# Patient Record
Sex: FEMALE | URBAN - METROPOLITAN AREA
[De-identification: names, ages, dates, MRNs, and addresses within clinical notes are randomized per-mention and may not be internally consistent; named-entity substitution may affect disease eponyms.]

---

## 2021-03-13 ENCOUNTER — INPATIENT (INPATIENT)
Facility: HOSPITAL | Age: 71
LOS: 5 days | Discharge: ANOTHER HOSPITAL | End: 2021-03-19
Attending: STUDENT IN AN ORGANIZED HEALTH CARE EDUCATION/TRAINING PROGRAM | Admitting: STUDENT IN AN ORGANIZED HEALTH CARE EDUCATION/TRAINING PROGRAM
Payer: MEDICARE

## 2021-03-13 VITALS
SYSTOLIC BLOOD PRESSURE: 137 MMHG | OXYGEN SATURATION: 96 % | RESPIRATION RATE: 16 BRPM | HEIGHT: 63 IN | HEART RATE: 106 BPM | DIASTOLIC BLOOD PRESSURE: 81 MMHG | WEIGHT: 160.06 LBS | TEMPERATURE: 97 F

## 2021-03-13 DIAGNOSIS — Z90.710 ACQUIRED ABSENCE OF BOTH CERVIX AND UTERUS: Chronic | ICD-10-CM

## 2021-03-13 LAB
ALBUMIN SERPL ELPH-MCNC: 4.4 G/DL — SIGNIFICANT CHANGE UP (ref 3.5–5.2)
ALP SERPL-CCNC: 139 U/L — HIGH (ref 30–115)
ALT FLD-CCNC: 12 U/L — SIGNIFICANT CHANGE UP (ref 0–41)
ANION GAP SERPL CALC-SCNC: 13 MMOL/L — SIGNIFICANT CHANGE UP (ref 7–14)
ANION GAP SERPL CALC-SCNC: 16 MMOL/L — HIGH (ref 7–14)
AST SERPL-CCNC: 18 U/L — SIGNIFICANT CHANGE UP (ref 0–41)
BASOPHILS # BLD AUTO: 0.06 K/UL — SIGNIFICANT CHANGE UP (ref 0–0.2)
BASOPHILS NFR BLD AUTO: 0.5 % — SIGNIFICANT CHANGE UP (ref 0–1)
BILIRUB SERPL-MCNC: 0.8 MG/DL — SIGNIFICANT CHANGE UP (ref 0.2–1.2)
BUN SERPL-MCNC: 19 MG/DL — SIGNIFICANT CHANGE UP (ref 10–20)
BUN SERPL-MCNC: 23 MG/DL — HIGH (ref 10–20)
CALCIUM SERPL-MCNC: 10.2 MG/DL — HIGH (ref 8.5–10.1)
CALCIUM SERPL-MCNC: 10.8 MG/DL — HIGH (ref 8.5–10.1)
CHLORIDE SERPL-SCNC: 95 MMOL/L — LOW (ref 98–110)
CHLORIDE SERPL-SCNC: 96 MMOL/L — LOW (ref 98–110)
CO2 SERPL-SCNC: 25 MMOL/L — SIGNIFICANT CHANGE UP (ref 17–32)
CO2 SERPL-SCNC: 25 MMOL/L — SIGNIFICANT CHANGE UP (ref 17–32)
CREAT SERPL-MCNC: 0.6 MG/DL — LOW (ref 0.7–1.5)
CREAT SERPL-MCNC: 0.8 MG/DL — SIGNIFICANT CHANGE UP (ref 0.7–1.5)
EOSINOPHIL # BLD AUTO: 0.19 K/UL — SIGNIFICANT CHANGE UP (ref 0–0.7)
EOSINOPHIL NFR BLD AUTO: 1.7 % — SIGNIFICANT CHANGE UP (ref 0–8)
GLUCOSE SERPL-MCNC: 106 MG/DL — HIGH (ref 70–99)
GLUCOSE SERPL-MCNC: 93 MG/DL — SIGNIFICANT CHANGE UP (ref 70–99)
HCT VFR BLD CALC: 40 % — SIGNIFICANT CHANGE UP (ref 37–47)
HGB BLD-MCNC: 13.2 G/DL — SIGNIFICANT CHANGE UP (ref 12–16)
IMM GRANULOCYTES NFR BLD AUTO: 1.2 % — HIGH (ref 0.1–0.3)
LYMPHOCYTES # BLD AUTO: 1.24 K/UL — SIGNIFICANT CHANGE UP (ref 1.2–3.4)
LYMPHOCYTES # BLD AUTO: 11 % — LOW (ref 20.5–51.1)
MCHC RBC-ENTMCNC: 30.6 PG — SIGNIFICANT CHANGE UP (ref 27–31)
MCHC RBC-ENTMCNC: 33 G/DL — SIGNIFICANT CHANGE UP (ref 32–37)
MCV RBC AUTO: 92.8 FL — SIGNIFICANT CHANGE UP (ref 81–99)
MONOCYTES # BLD AUTO: 0.74 K/UL — HIGH (ref 0.1–0.6)
MONOCYTES NFR BLD AUTO: 6.6 % — SIGNIFICANT CHANGE UP (ref 1.7–9.3)
NEUTROPHILS # BLD AUTO: 8.91 K/UL — HIGH (ref 1.4–6.5)
NEUTROPHILS NFR BLD AUTO: 79 % — HIGH (ref 42.2–75.2)
NRBC # BLD: 0 /100 WBCS — SIGNIFICANT CHANGE UP (ref 0–0)
PLATELET # BLD AUTO: 219 K/UL — SIGNIFICANT CHANGE UP (ref 130–400)
POTASSIUM SERPL-MCNC: 4 MMOL/L — SIGNIFICANT CHANGE UP (ref 3.5–5)
POTASSIUM SERPL-MCNC: 4.1 MMOL/L — SIGNIFICANT CHANGE UP (ref 3.5–5)
POTASSIUM SERPL-SCNC: 4 MMOL/L — SIGNIFICANT CHANGE UP (ref 3.5–5)
POTASSIUM SERPL-SCNC: 4.1 MMOL/L — SIGNIFICANT CHANGE UP (ref 3.5–5)
PROT SERPL-MCNC: 7.9 G/DL — SIGNIFICANT CHANGE UP (ref 6–8)
RBC # BLD: 4.31 M/UL — SIGNIFICANT CHANGE UP (ref 4.2–5.4)
RBC # FLD: 13.5 % — SIGNIFICANT CHANGE UP (ref 11.5–14.5)
SARS-COV-2 RNA SPEC QL NAA+PROBE: SIGNIFICANT CHANGE UP
SODIUM SERPL-SCNC: 134 MMOL/L — LOW (ref 135–146)
SODIUM SERPL-SCNC: 136 MMOL/L — SIGNIFICANT CHANGE UP (ref 135–146)
WBC # BLD: 11.27 K/UL — HIGH (ref 4.8–10.8)
WBC # FLD AUTO: 11.27 K/UL — HIGH (ref 4.8–10.8)

## 2021-03-13 PROCEDURE — 73030 X-RAY EXAM OF SHOULDER: CPT | Mod: 26,50

## 2021-03-13 PROCEDURE — 93010 ELECTROCARDIOGRAM REPORT: CPT

## 2021-03-13 PROCEDURE — 99285 EMERGENCY DEPT VISIT HI MDM: CPT | Mod: CS

## 2021-03-13 PROCEDURE — 71045 X-RAY EXAM CHEST 1 VIEW: CPT | Mod: 26

## 2021-03-13 PROCEDURE — 72100 X-RAY EXAM L-S SPINE 2/3 VWS: CPT | Mod: 26

## 2021-03-13 PROCEDURE — 73521 X-RAY EXAM HIPS BI 2 VIEWS: CPT | Mod: 26

## 2021-03-13 PROCEDURE — 99223 1ST HOSP IP/OBS HIGH 75: CPT

## 2021-03-13 RX ORDER — SODIUM CHLORIDE 9 MG/ML
1000 INJECTION, SOLUTION INTRAVENOUS
Refills: 0 | Status: DISCONTINUED | OUTPATIENT
Start: 2021-03-13 | End: 2021-03-13

## 2021-03-13 RX ORDER — SODIUM CHLORIDE 9 MG/ML
1000 INJECTION, SOLUTION INTRAVENOUS
Refills: 0 | Status: DISCONTINUED | OUTPATIENT
Start: 2021-03-13 | End: 2021-03-15

## 2021-03-13 RX ORDER — ACETAMINOPHEN 500 MG
650 TABLET ORAL EVERY 6 HOURS
Refills: 0 | Status: DISCONTINUED | OUTPATIENT
Start: 2021-03-13 | End: 2021-03-19

## 2021-03-13 RX ORDER — ENOXAPARIN SODIUM 100 MG/ML
40 INJECTION SUBCUTANEOUS DAILY
Refills: 0 | Status: DISCONTINUED | OUTPATIENT
Start: 2021-03-13 | End: 2021-03-19

## 2021-03-13 RX ADMIN — SODIUM CHLORIDE 250 MILLILITER(S): 9 INJECTION, SOLUTION INTRAVENOUS at 12:45

## 2021-03-13 NOTE — ED PROVIDER NOTE - OBJECTIVE STATEMENT
Pt with hx of OA, lives alone and has chronic right knee, hip and shoulder pain that prevents her from properly taking care of herself. Pt states that for 3 months has not been drinking or eating enough because of her inability to take care of her self secondary from arthritic pain. No relief with tylenol. Pt feels generalized weakness and states her urine is dark. Denies abd pain, NVD, fever, chills, dysuria, cough, SOB.

## 2021-03-13 NOTE — ED PROVIDER NOTE - NS ED ROS FT
CONST: No fever, chills or bodyaches  EYES: No pain, redness, drainage or visual changes.  ENT: No ear pain or discharge, nasal discharge or congestion. No sore throat  CARD: No chest pain, palpitations  RESP: No SOB, cough, hemoptysis. No hx of asthma or COPD  GI: No abdominal pain, N/V/D  MS: Chronic joint pain or right shoulder hip and knee, No back pain  pain/injury  SKIN: No rashes  NEURO: No headache, dizziness, paresthesias or LOC  : No dysuria

## 2021-03-13 NOTE — ED ADULT NURSE NOTE - OBJECTIVE STATEMENT
Pt presents to ED c/o generalized weakness, joint pain, loss of appetite, dehydration, all since January. Pt denies PMH/current meds. Pt. lives alone. Denies fever, chills, sob, cp.

## 2021-03-13 NOTE — H&P ADULT - NSHPLABSRESULTS_GEN_ALL_CORE
13.2   11.27 )-----------( 219      ( 13 Mar 2021 12:21 )             40.0     03-13    136  |  95<L>  |  23<H>  ----------------------------<  106<H>  4.0   |  25  |  0.8    Ca    10.8<H>      13 Mar 2021 12:21    TPro  7.9  /  Alb  4.4  /  TBili  0.8  /  DBili  x   /  AST  18  /  ALT  12  /  AlkPhos  139<H>  03-13

## 2021-03-13 NOTE — ED PROVIDER NOTE - CLINICAL SUMMARY MEDICAL DECISION MAKING FREE TEXT BOX
Pt w/ generalized weakness. labs suggest dehydration. After IVF, pt still says she's too weak to stand, and also endorses concern for falling bc so weak. Will admit to medicine for failure to ambulate.

## 2021-03-13 NOTE — H&P ADULT - NSHPPHYSICALEXAM_GEN_ALL_CORE
GENERAL: Obese, NAD, lying in bed comfortably  HEAD: Atraumatic, Normocephalic  EYES: EOMI, PERRLA, conjunctiva pink and cornea white  ENT: Normal external ears and nose, no discharges; moist mucous membranes  NECK: Supple, nontender to palpation; no JVD  CHEST/LUNG: Clear to auscultation bilaterally; No rales, rhonchi, wheezing, or rubs. Unlabored respirations  HEART: Regular rate and rhythm; No murmurs, rubs, or gallops  ABDOMEN: Soft, nontender, nondistended; no rebound tenderness, no guarding; + bowel sounds  EXTREMITIES:  2+ Peripheral Pulses, brisk capillary refill. No clubbing, cyanosis, or pitting petal edema  NERVOUS SYSTEM: Alert and oriented to person, time, place and situation, speech clear. No focal deficits   MSK: Decreased ROM on bilateral shoulder, worse on right, with point tenderness at the above the scapula. Pain on palpation of right hip; patient refused to move her hip; No tenderness on knees, ankles, wrists or elbow  SKIN: No rashes or lesions

## 2021-03-13 NOTE — H&P ADULT - HISTORY OF PRESENT ILLNESS
70 years old female from home with PMHx of OA presented for difficulty ambulation 70 years old female from home with PMHx of OA of bilateral knees, right rotator ruff tear (?), gout, diverticulosis, presented with difficulty ambulation. Patient started to have worsening bilateral hip pain, and right shoulder pain, severely hindering her daily activities. She uses a walker to ambulate around her house but it has been getting more and more difficult complete her daily routine due to worsening pain. She also feels short of breath after exertion.     Patient also has been having poor appetite and nausea. She did not weight herself regularly but her sister noticed her clothes are getting looser. She did not have EGD in the past but her last colonoscopy in Feb 2020 was unrevealing, only some small polyps (GI Dr. Trammell 70 years old female from home with PMHx of OA of bilateral knees, right rotator ruff tear (?), gout, diverticulosis, presented with difficulty ambulation. Patient started to have worsening bilateral hip pain, and right shoulder pain, severely hindering her daily activities. She uses a walker to ambulate around her house but it has been getting more and more difficult complete her daily routine due to worsening pain. She also feels short of breath after exertion.     Patient also has been having poor appetite and nausea. She did not weight herself regularly but her sister noticed her clothes are getting looser. She did not have EGD in the past but her last colonoscopy in Feb 2020 was unrevealing, only some small polyps.  70 years old female from home and lives alone with PMHx of OA of bilateral knees, right rotator ruff tear (?), gout, diverticulosis, presented with difficulty ambulation. Patient started to have worsening bilateral hip pain, and right shoulder pain, severely hindering her daily activities. She uses a walker to ambulate around her house but it has been getting more and more difficult complete her daily routine due to worsening pain. She also feels short of breath after exertion.     Patient also has been having poor appetite, nausea, and constipation. She did not weight herself regularly but her sister noticed her clothes are getting looser. She did not have EGD in the past but her last colonoscopy in Feb 2020 was unrevealing, only some small polyps.    She denies fever/chills, cough, chest pain, abdominal pain, vomiting, diarrhea, or urinary symptoms.    Called PCP Dr. Urbina, he confirmed patient's history but he is worried that she might need psychiatric evaluation due to recent stress (her pet passed away and she locked himself in her house for awhile).

## 2021-03-13 NOTE — ED PROVIDER NOTE - ATTENDING CONTRIBUTION TO CARE
71 y/o F pmh OA, w/ chronic R knee and shoulder pain, worsening p/w generallized weakness, poor PO, difficulty ambulating, progressively getting worse x 3mo, worse past 1wk. Pt seen by sister today, who noted pt was so week could not ambulate to in house (usu uses walker). pt denies focal weakness, falls, v/d, fever, cough.    On exam pt is tired, Dry MMM; breathing unlabored, abd s/nt; pt is moving all extrem, good mm tone but feels too weak to rise, and cannot stand bc of weakness.    IMP: dehydration, generalized weakness, decreased ambulation.  P: labs, ivf, reassess.

## 2021-03-13 NOTE — H&P ADULT - ASSESSMENT
70 years old female from home and lives alone with PMHx of OA of bilateral knees, right rotator ruff tear (?), gout, diverticulosis, presented with difficulty ambulation due to worsening pain on hips and shoulders.    # Probable worsening osteoarthritis of hips and shoulders  - Patient is current comfortable and pain free but unable to take care of herself at home  - Will get xray of bilateral shoulders and hips, and CXR  - Pain control with Tylenol as needed  - PT/OT, activity increased as tolerated  - Consider consulting Ortho if there are concering xray findings    # Mild hypercalcemia  - Ca 10.4, albumin 4.4  - Likely secondary to dehydration (patient reports not eating or drinking much because it was difficult for her to get around)  - Continue gentle IVF for now  - Repeat BMP at 4pm  - If it is still elevated, please get vitamin D 1,25 and 25; PTH, urine spot calcium, phos    # Gout  - Not on any medication  - Will avoid red meat diet while inpatinet      DVT ppx: Lovenox 40mg qdaily  GI ppx: Protonix  Diet: Regular  Activity: Increase as tolerated   Ambulatory status: Ambulate with walker  Lines: Peripheral IVs  Code status: FULL CODE  Dispo: acute, will need proper placement

## 2021-03-13 NOTE — ED PROVIDER NOTE - CARE PLAN
Principal Discharge DX:	Generalized weakness  Secondary Diagnosis:	Inability to ambulate due to multiple joints  Secondary Diagnosis:	Dehydration

## 2021-03-13 NOTE — H&P ADULT - ATTENDING COMMENTS
HPI:  70 years old woman from home and lives alone with PMHx of OA of bilateral knees, right rotator ruff tear (?), gout, diverticulosis, presented with difficulty ambulation. She started to have worsening bilateral hip pain, and right shoulder pain, over the last couple months. She reports that her out patient doctors suggested that she have her right hip replaced. Over the last several weeks the pain has increased severely hindering her daily activities. She uses a walker to ambulate around her house and feels short of breath after exertion after minimal exertion. No LE edema, palpitations, chest pain, fever, chills, cough, abdo pain, v/d. She does report constipation and mild nausea.    She has not been eating or drinking well because it is too difficult to move around her house. She does have people to check on her but doesn't want to burden them with her problems.  Her sister reported that Catie's clothes have been looser lately. She did not have EGD in the past but her last colonoscopy in Feb 2020 was unrevealing, only some small polyps.    She denied any low mood or depression. She stays actively engaged and communicates frequently with friends and family. Close with her sister in [place], friends stop by to check on her.       REVIEW OF SYSTEMS:  CONSTITUTIONAL:  No weakness, fevers, chills, night sweats, weight loss  EYES/ENT: No visual changes. No vertigo or dysphagia  NECK: No neck pain or stiffness  RESPIRATORY: No cough, wheezing, hemoptysis. No shortness of breath  CARDIOVASCULAR: No chest pain or palpitations. No lower extremity edema  GASTROINTESTINAL: No abdominal pain. + constipation, nausea, no vomiting, diarrhea, or hematemesis  GENITOURINARY: No dysuria or hematuria   NEUROLOGICAL: No focal numbness or weakness  MSK: +right hip pain, difficulty ambulating   SKIN: No rashes or itching  HEMATOLOGIC: No easy bruising or prolonged bleeding.      PHYSICAL EXAM:  GENERAL: mild distress, obese, Non-toxic, stated age   HEAD:  Atraumatic, Normocephalic, dry mucus membranes   EYES: EOMI, Sclera White   NECK: Supple, No JVD  CHEST/LUNG: Clear to auscultation bilaterally; No wheezing, rhonchi, or crackles  HEART: Regular rate and rhythm; s1, s2, No murmurs, rubs, or gallops  ABDOMEN: Soft, Nontender, Nondistended; Bowel sounds present, No rebound or guarding noted   EXTREMITIES:  No lower extremity edema or calf tenderness to palpation.  No clubbing or cyanosis  MSK: Right hip pain to palpation/pressure. Right hip active flexion causes tenderness but passive movement does not.   PSYCH: AAOx3, pleasant, cooperative, not anxious  NEUROLOGY: non-focal, 4/5 RLE hip flexion secondary to pain.   SKIN: No rashes or lesions      ASSESSMENT AND PLAN:  Difficulty ambulating and inability to care for oneself: Secondary to severe OA of Right. Follow up Xrays of right hip and shoulder. May require ortho evaluation. PT eval. Pain control with tylenol, Lidoderm patch, and toradol     Dyspnea on exertion: May be secondary to deconditioning but check 2D Echo.    Dehydration with mild contraction: continue with IV fluids for 1 more liter. She is actively thirsty and hungry.     Hypercalcemia: may be secondary to dehydration. Improved with IVF, check PTH, Vitamin D.    Gout: managed with diet.     Obesity: Patient should follow with an out patient nutritionist/dietician     DVT ppx: Lovenox/Heparin  GI ppx: While on toradol.   GOC: Full code.  My note supersedes the residents in the event of a discrepancy.

## 2021-03-13 NOTE — ED ADULT TRIAGE NOTE - CHIEF COMPLAINT QUOTE
Pt presents to ED c/o generalized weakness, joint pain, loss of appetite, dehydration, all since January. Pt denies PMH/current meds.

## 2021-03-14 ENCOUNTER — TRANSCRIPTION ENCOUNTER (OUTPATIENT)
Age: 71
End: 2021-03-14

## 2021-03-14 LAB
ALBUMIN SERPL ELPH-MCNC: 3.8 G/DL — SIGNIFICANT CHANGE UP (ref 3.5–5.2)
ALP SERPL-CCNC: 108 U/L — SIGNIFICANT CHANGE UP (ref 30–115)
ALT FLD-CCNC: 9 U/L — SIGNIFICANT CHANGE UP (ref 0–41)
ANION GAP SERPL CALC-SCNC: 15 MMOL/L — HIGH (ref 7–14)
APPEARANCE UR: ABNORMAL
AST SERPL-CCNC: 17 U/L — SIGNIFICANT CHANGE UP (ref 0–41)
BACTERIA # UR AUTO: ABNORMAL
BASOPHILS # BLD AUTO: 0.04 K/UL — SIGNIFICANT CHANGE UP (ref 0–0.2)
BASOPHILS NFR BLD AUTO: 0.4 % — SIGNIFICANT CHANGE UP (ref 0–1)
BILIRUB SERPL-MCNC: 0.6 MG/DL — SIGNIFICANT CHANGE UP (ref 0.2–1.2)
BILIRUB UR-MCNC: NEGATIVE — SIGNIFICANT CHANGE UP
BUN SERPL-MCNC: 18 MG/DL — SIGNIFICANT CHANGE UP (ref 10–20)
CALCIUM SERPL-MCNC: 9.9 MG/DL — SIGNIFICANT CHANGE UP (ref 8.5–10.1)
CHLORIDE SERPL-SCNC: 98 MMOL/L — SIGNIFICANT CHANGE UP (ref 98–110)
CO2 SERPL-SCNC: 23 MMOL/L — SIGNIFICANT CHANGE UP (ref 17–32)
COLOR SPEC: YELLOW — SIGNIFICANT CHANGE UP
CREAT SERPL-MCNC: 0.6 MG/DL — LOW (ref 0.7–1.5)
DIFF PNL FLD: NEGATIVE — SIGNIFICANT CHANGE UP
EOSINOPHIL # BLD AUTO: 0.21 K/UL — SIGNIFICANT CHANGE UP (ref 0–0.7)
EOSINOPHIL NFR BLD AUTO: 1.9 % — SIGNIFICANT CHANGE UP (ref 0–8)
EPI CELLS # UR: 4 /HPF — SIGNIFICANT CHANGE UP (ref 0–5)
GLUCOSE SERPL-MCNC: 105 MG/DL — HIGH (ref 70–99)
GLUCOSE UR QL: NEGATIVE — SIGNIFICANT CHANGE UP
HCT VFR BLD CALC: 35 % — LOW (ref 37–47)
HGB BLD-MCNC: 11.9 G/DL — LOW (ref 12–16)
HYALINE CASTS # UR AUTO: 3 /LPF — SIGNIFICANT CHANGE UP (ref 0–7)
IMM GRANULOCYTES NFR BLD AUTO: 1.4 % — HIGH (ref 0.1–0.3)
KETONES UR-MCNC: SIGNIFICANT CHANGE UP
LEUKOCYTE ESTERASE UR-ACNC: NEGATIVE — SIGNIFICANT CHANGE UP
LYMPHOCYTES # BLD AUTO: 1.55 K/UL — SIGNIFICANT CHANGE UP (ref 1.2–3.4)
LYMPHOCYTES # BLD AUTO: 13.8 % — LOW (ref 20.5–51.1)
MAGNESIUM SERPL-MCNC: 1.8 MG/DL — SIGNIFICANT CHANGE UP (ref 1.8–2.4)
MCHC RBC-ENTMCNC: 31.6 PG — HIGH (ref 27–31)
MCHC RBC-ENTMCNC: 34 G/DL — SIGNIFICANT CHANGE UP (ref 32–37)
MCV RBC AUTO: 92.8 FL — SIGNIFICANT CHANGE UP (ref 81–99)
MONOCYTES # BLD AUTO: 0.95 K/UL — HIGH (ref 0.1–0.6)
MONOCYTES NFR BLD AUTO: 8.4 % — SIGNIFICANT CHANGE UP (ref 1.7–9.3)
NEUTROPHILS # BLD AUTO: 8.34 K/UL — HIGH (ref 1.4–6.5)
NEUTROPHILS NFR BLD AUTO: 74.1 % — SIGNIFICANT CHANGE UP (ref 42.2–75.2)
NITRITE UR-MCNC: POSITIVE
NRBC # BLD: 0 /100 WBCS — SIGNIFICANT CHANGE UP (ref 0–0)
PH UR: 5.5 — SIGNIFICANT CHANGE UP (ref 5–8)
PHOSPHATE SERPL-MCNC: 4.1 MG/DL — SIGNIFICANT CHANGE UP (ref 2.1–4.9)
PLATELET # BLD AUTO: 195 K/UL — SIGNIFICANT CHANGE UP (ref 130–400)
POTASSIUM SERPL-MCNC: 4.4 MMOL/L — SIGNIFICANT CHANGE UP (ref 3.5–5)
POTASSIUM SERPL-SCNC: 4.4 MMOL/L — SIGNIFICANT CHANGE UP (ref 3.5–5)
PROT SERPL-MCNC: 6.7 G/DL — SIGNIFICANT CHANGE UP (ref 6–8)
PROT UR-MCNC: SIGNIFICANT CHANGE UP
RBC # BLD: 3.77 M/UL — LOW (ref 4.2–5.4)
RBC # FLD: 13.9 % — SIGNIFICANT CHANGE UP (ref 11.5–14.5)
RBC CASTS # UR COMP ASSIST: 1 /HPF — SIGNIFICANT CHANGE UP (ref 0–4)
SODIUM SERPL-SCNC: 136 MMOL/L — SIGNIFICANT CHANGE UP (ref 135–146)
SP GR SPEC: 1.03 — SIGNIFICANT CHANGE UP (ref 1.01–1.03)
UROBILINOGEN FLD QL: SIGNIFICANT CHANGE UP
WBC # BLD: 11.25 K/UL — HIGH (ref 4.8–10.8)
WBC # FLD AUTO: 11.25 K/UL — HIGH (ref 4.8–10.8)
WBC UR QL: 2 /HPF — SIGNIFICANT CHANGE UP (ref 0–5)

## 2021-03-14 PROCEDURE — 73562 X-RAY EXAM OF KNEE 3: CPT | Mod: 26,RT

## 2021-03-14 PROCEDURE — 73552 X-RAY EXAM OF FEMUR 2/>: CPT | Mod: 26,RT

## 2021-03-14 PROCEDURE — 71250 CT THORAX DX C-: CPT | Mod: 26

## 2021-03-14 PROCEDURE — 72192 CT PELVIS W/O DYE: CPT | Mod: 26

## 2021-03-14 PROCEDURE — 71045 X-RAY EXAM CHEST 1 VIEW: CPT | Mod: 26

## 2021-03-14 PROCEDURE — 73502 X-RAY EXAM HIP UNI 2-3 VIEWS: CPT | Mod: 26,RT

## 2021-03-14 PROCEDURE — 99233 SBSQ HOSP IP/OBS HIGH 50: CPT

## 2021-03-14 RX ADMIN — Medication 650 MILLIGRAM(S): at 22:34

## 2021-03-14 RX ADMIN — ENOXAPARIN SODIUM 40 MILLIGRAM(S): 100 INJECTION SUBCUTANEOUS at 12:17

## 2021-03-14 RX ADMIN — SODIUM CHLORIDE 100 MILLILITER(S): 9 INJECTION, SOLUTION INTRAVENOUS at 05:35

## 2021-03-14 RX ADMIN — Medication 650 MILLIGRAM(S): at 23:00

## 2021-03-14 NOTE — PROGRESS NOTE ADULT - SUBJECTIVE AND OBJECTIVE BOX
Autumn Allan MD  Hospitalist   Spectra: [phone]    Patient is a 70y old  Female who presents with a chief complaint of difficulty ambulation (13 Mar 2021 15:17)      INTERVAL HPI/OVERNIGHT EVENTS: no acute overnight events noted   reports chronic pain over shoulder, back and right hip     REVIEW OF SYSTEMS: negative  Vital Signs Last 24 Hrs  T(C): 35.9 (14 Mar 2021 08:51), Max: 36.6 (14 Mar 2021 00:31)  T(F): 96.7 (14 Mar 2021 08:51), Max: 97.8 (14 Mar 2021 00:31)  HR: 107 (14 Mar 2021 08:51) (84 - 107)  BP: 142/86 (14 Mar 2021 08:51) (124/74 - 152/74)  BP(mean): --  RR: 18 (14 Mar 2021 08:51) (18 - 18)  SpO2: 93% (14 Mar 2021 00:31) (93% - 96%)    PHYSICAL EXAM:   NAD; Normocephalic;   LUNGS - no wheezing  HEART: S1 S2+   ABDOMEN: Soft, Nontender, non distended  EXTREMITIES: no cyanosis; no edema  NERVOUS SYSTEM:  Awake and alert; no focal neuro deficits appreciated    LABS:                        11.9   11.25 )-----------( 195      ( 14 Mar 2021 06:03 )             35.0     03-14    136  |  98  |  18  ----------------------------<  105<H>  4.4   |  23  |  0.6<L>    Ca    9.9      14 Mar 2021 06:03  Phos  4.1     03-14  Mg     1.8     03-14    TPro  6.7  /  Alb  3.8  /  TBili  0.6  /  DBili  x   /  AST  17  /  ALT  9   /  AlkPhos  108  03-14      Urinalysis Basic - ( 14 Mar 2021 08:00 )    Color: Yellow / Appearance: Slightly Turbid / S.027 / pH: x  Gluc: x / Ketone: Trace  / Bili: Negative / Urobili: <2 mg/dL   Blood: x / Protein: Trace / Nitrite: Positive   Leuk Esterase: Negative / RBC: 1 /HPF / WBC 2 /HPF   Sq Epi: x / Non Sq Epi: 4 /HPF / Bacteria: Many      CAPILLARY BLOOD GLUCOSE

## 2021-03-14 NOTE — PHYSICAL THERAPY INITIAL EVALUATION ADULT - LEVEL OF INDEPENDENCE, REHAB EVAL
Patient encountered sitting in chair. Reports being assisted by staff to get in chair./unable to perform

## 2021-03-14 NOTE — CONSULT NOTE ADULT - SUBJECTIVE AND OBJECTIVE BOX
Orthopaedics Consult Note    DOV HENLEY  [phone]    Time consult called: 7:55PM  Time patient seen: 8:00PM    Patient is a 70y year old Female admitted for generalized weakness, weight loss, dyspnea, dehydration and increasing ambulatory dysfunction related to shoulder, hip, knee, and back pains  XR obtained yesterday evening revealed right femoral head sclerotic areas suggesting AVN and abnormal angulation of the femoral neck possibly representing non-displaced fracture  Ortho is consulted this evening to evaluate these findings  Patient complains of pain in the shoulder, back, and right hip  She walks with a walker at home  No fall or trauma    PMH/PSH  GNERALIZED WEAKNESS,INABILITY TO AMBULATE ,DEHTDRATION    ^WEAKNESS    Handoff    MEWS Score    Diverticulosis    Gout    OA (osteoarthritis)    Generalized weakness    H/O total hysterectomy    WEAKNESS    Dehydration    Inability to ambulate due to multiple joints    Medications  acetaminophen   Tablet .. 650 milliGRAM(s) Oral every 6 hours PRN  enoxaparin Injectable 40 milliGRAM(s) SubCutaneous daily  lactated ringers. 1000 milliLiter(s) IV Continuous <Continuous>      Allergies  moxifloxacin (Anaphylaxis (Moderate))        T(C): 36 (03-14-21 @ 16:51), Max: 36.6 (03-14-21 @ 00:31)  HR: 107 (03-14-21 @ 16:51) (84 - 107)  BP: 149/76 (03-14-21 @ 16:51) (124/74 - 152/74)  RR: 20 (03-14-21 @ 18:56) (18 - 22)  SpO2: 94% (03-14-21 @ 18:56) (84% - 96%)    Physical Exam  NAD  Dyspneic on nasal cannula  LUE  NTTP L shoulder  No deformity/laceration/abrasion noted  No swelling/erythema/ecchymosis noted  Motor: AIN/PIN/Ulnar intact  Sensory: Ax/M/R/U intact  Vasc: hand WWP, 2+ radial pulse  RLE  No pain with log roll, axial load, or passive ROM  Able to weakly maintain SLR  No deformity/laceration/abrasion noted  No swelling/erythema/ecchymosis noted  Motor: TA/EHL/Gastroc intact  Sensory: SP/DP/Hernandez/Sa intact  Vasc: foot WWP, 2+ DP pulse    Labs                        11.9   11.25 )-----------( 195      ( 14 Mar 2021 06:03 )             35.0     03-14    136  |  98  |  18  ----------------------------<  105<H>  4.4   |  23  |  0.6<L>    Ca    9.9      14 Mar 2021 06:03  Phos  4.1     03-14  Mg     1.8     03-14    TPro  6.7  /  Alb  3.8  /  TBili  0.6  /  DBili  x   /  AST  17  /  ALT  9   /  AlkPhos  108  03-14    LIVER FUNCTIONS - ( 14 Mar 2021 06:03 )  Alb: 3.8 g/dL / Pro: 6.7 g/dL / ALK PHOS: 108 U/L / ALT: 9 U/L / AST: 17 U/L / GGT: x               Img  XR BILATERAL SHOULDER, PELVIS, AND HIPS  Mild superior subluxation of the left humeral head  Calcification near the GT of the right humeral head possibly representing calcific tendinitis  Mild right femoral head sclerosis  Right hip joint space narrowing and osteophytes indicating moderate right hip OA  No obvious displaced femur fracture, no pelvis fracture, no dislocation    XR lumbar spine  Significant facet arthropathy  L4 on 5 spondylolisthesis  Multilevel degenerative disc disease    Chest CT  Bilateral glenohumeral joints are reduced    A/P: Patient is a 70y year old Female with right hip pain, low back pain, and shoulder pain    Severe low back pain and weakness could have spinal etiology given severe degenerative changes in this area  Suggest neurosurgical evaluation     Regarding the right hip  No obvious displaced fracture on limited XR available  Further, physical examination does not indicate hip fracture  However, given abnormal findings on pelvis XR we recommend XR R hip, femur, and knee as well as CT Pelvis noncontrast with fine bony cuts  NWB until CT is completed  Patient provisionally NPO after MN tonight  If CT shows femoral neck fracture then surgery would be recommended, to be completed in an expeditious fashion    Regarding the shoulders  No dislocation or fracture on XR and CT  Some radiographic and physical examination evidence of rotator cuff pathology  Recommend PT / Rehab evaluation and medical management at this time    Orthopaedics to follow

## 2021-03-14 NOTE — PROGRESS NOTE ADULT - ASSESSMENT
Plan:     1. Nondisplaced femoral neck subcapital impaction fracture  2. FRYE- right hillar mass   3. ambulatory dysfunction   4. gout   5. obestiy     - patient presented with ambulatory dysfunction 2/2 chronic pain and dyspnea with minimal exertion   - Xray Hip: There is patchy sclerosis at the right femoral head consistent with secondary AVN. Band of sclerosis with mild angulation of the femoral head neck junction suggests nondisplaced femoral neck subcapital impaction fracture.   - ortho consulted as pain is co-relating with fracture site   - chest xray - right hilarl mass   - no previous chest imaging available to compare   - will obtain CT chest as mass likely cause  - follow echo   - calcium level improved with hydration   - lovenox     Dispo: acute at this time, ortho and hilar mass work up in progress

## 2021-03-14 NOTE — PHYSICAL THERAPY INITIAL EVALUATION ADULT - TRANSFER SAFETY CONCERNS NOTED: SIT/STAND, REHAB EVAL
decreased safety awareness/stepping too close to front of assistive device/decreased weight-shifting ability

## 2021-03-14 NOTE — OCCUPATIONAL THERAPY INITIAL EVALUATION ADULT - PERTINENT HX OF CURRENT PROBLEM, REHAB EVAL
70 years old female from home and lives alone with PMHx of OA of bilateral knees, right rotator ruff tear (?), gout, diverticulosis, presented with difficulty ambulation. Patient started to have worsening bilateral hip pain, and right shoulder pain, severely hindering her daily activities. She uses a walker to ambulate around her house but it has been getting more and more difficult complete her daily routine due to worsening pain. She also feels short of breath after exertion.

## 2021-03-14 NOTE — PHYSICAL THERAPY INITIAL EVALUATION ADULT - GAIT DEVIATIONS NOTED, PT EVAL
increased time in double stance/decreased step length/decreased stride length/increased stride width

## 2021-03-14 NOTE — DISCHARGE NOTE NURSING/CASE MANAGEMENT/SOCIAL WORK - PATIENT PORTAL LINK FT
You can access the FollowMyHealth Patient Portal offered by [facility] by registering at the following website: [email or web address]. By joining [facility]’s FollowMyHealth portal, you will also be able to view your health information using other applications (apps) compatible with our system.

## 2021-03-14 NOTE — OCCUPATIONAL THERAPY INITIAL EVALUATION ADULT - ADDITIONAL COMMENTS
Pt lives alone in  but reports great difficulty with all ADL's. Pt reports it taking three hours to walk from the couch to the kitchen or to sponge bath in bathroom. Pt's friend shops for her but Pt. reports not eating/drinking very much due to difficulty of moving around. R shoulder/hip has gotten significantly worse in the past 3 months. Pt uses RW for all household mobility. Pt dons socks on toilet.

## 2021-03-14 NOTE — OCCUPATIONAL THERAPY INITIAL EVALUATION ADULT - RANGE OF MOTION EXAMINATION
R Shoulder ~ 20* flexion, Distal grossly 3/5, Pain with PROM over 100* ;  L UE grossly 3/5./deficits as listed below

## 2021-03-15 ENCOUNTER — RESULT REVIEW (OUTPATIENT)
Age: 71
End: 2021-03-15

## 2021-03-15 LAB
ALBUMIN SERPL ELPH-MCNC: 3.3 G/DL — LOW (ref 3.5–5.2)
ALP SERPL-CCNC: 108 U/L — SIGNIFICANT CHANGE UP (ref 30–115)
ALT FLD-CCNC: 9 U/L — SIGNIFICANT CHANGE UP (ref 0–41)
ANION GAP SERPL CALC-SCNC: 12 MMOL/L — SIGNIFICANT CHANGE UP (ref 7–14)
AST SERPL-CCNC: 17 U/L — SIGNIFICANT CHANGE UP (ref 0–41)
B PERT IGG+IGM PNL SER: ABNORMAL
BASOPHILS # BLD AUTO: 0.05 K/UL — SIGNIFICANT CHANGE UP (ref 0–0.2)
BASOPHILS NFR BLD AUTO: 0.5 % — SIGNIFICANT CHANGE UP (ref 0–1)
BILIRUB SERPL-MCNC: 0.5 MG/DL — SIGNIFICANT CHANGE UP (ref 0.2–1.2)
BUN SERPL-MCNC: 16 MG/DL — SIGNIFICANT CHANGE UP (ref 10–20)
CALCIUM SERPL-MCNC: 9.7 MG/DL — SIGNIFICANT CHANGE UP (ref 8.5–10.1)
CHLORIDE SERPL-SCNC: 98 MMOL/L — SIGNIFICANT CHANGE UP (ref 98–110)
CO2 SERPL-SCNC: 24 MMOL/L — SIGNIFICANT CHANGE UP (ref 17–32)
COLOR FLD: SIGNIFICANT CHANGE UP
CREAT SERPL-MCNC: 0.6 MG/DL — LOW (ref 0.7–1.5)
EOSINOPHIL # BLD AUTO: 0.29 K/UL — SIGNIFICANT CHANGE UP (ref 0–0.7)
EOSINOPHIL NFR BLD AUTO: 3 % — SIGNIFICANT CHANGE UP (ref 0–8)
FLUID INTAKE SUBSTANCE CLASS: SIGNIFICANT CHANGE UP
FLUID SEGMENTED GRANULOCYTES: 0 % — SIGNIFICANT CHANGE UP
GLUCOSE SERPL-MCNC: 93 MG/DL — SIGNIFICANT CHANGE UP (ref 70–99)
HCT VFR BLD CALC: 30.8 % — LOW (ref 37–47)
HCV AB S/CO SERPL IA: 0.03 COI — SIGNIFICANT CHANGE UP
HCV AB SERPL-IMP: SIGNIFICANT CHANGE UP
HGB BLD-MCNC: 10.1 G/DL — LOW (ref 12–16)
IMM GRANULOCYTES NFR BLD AUTO: 1.4 % — HIGH (ref 0.1–0.3)
LYMPHOCYTES # BLD AUTO: 1.61 K/UL — SIGNIFICANT CHANGE UP (ref 1.2–3.4)
LYMPHOCYTES # BLD AUTO: 16.9 % — LOW (ref 20.5–51.1)
LYMPHOCYTES # FLD: SIGNIFICANT CHANGE UP
MAGNESIUM SERPL-MCNC: 1.8 MG/DL — SIGNIFICANT CHANGE UP (ref 1.8–2.4)
MCHC RBC-ENTMCNC: 30.8 PG — SIGNIFICANT CHANGE UP (ref 27–31)
MCHC RBC-ENTMCNC: 32.8 G/DL — SIGNIFICANT CHANGE UP (ref 32–37)
MCV RBC AUTO: 93.9 FL — SIGNIFICANT CHANGE UP (ref 81–99)
MESOTHL CELL # FLD: SIGNIFICANT CHANGE UP %
MONOCYTES # BLD AUTO: 0.77 K/UL — HIGH (ref 0.1–0.6)
MONOCYTES NFR BLD AUTO: 8.1 % — SIGNIFICANT CHANGE UP (ref 1.7–9.3)
MONOS+MACROS # FLD: SIGNIFICANT CHANGE UP %
NEUTROPHILS # BLD AUTO: 6.67 K/UL — HIGH (ref 1.4–6.5)
NEUTROPHILS NFR BLD AUTO: 70.1 % — SIGNIFICANT CHANGE UP (ref 42.2–75.2)
NRBC # BLD: 0 /100 WBCS — SIGNIFICANT CHANGE UP (ref 0–0)
PLATELET # BLD AUTO: 172 K/UL — SIGNIFICANT CHANGE UP (ref 130–400)
POTASSIUM SERPL-MCNC: 4.4 MMOL/L — SIGNIFICANT CHANGE UP (ref 3.5–5)
POTASSIUM SERPL-SCNC: 4.4 MMOL/L — SIGNIFICANT CHANGE UP (ref 3.5–5)
PROT SERPL-MCNC: 6.2 G/DL — SIGNIFICANT CHANGE UP (ref 6–8)
RBC # BLD: 3.28 M/UL — LOW (ref 4.2–5.4)
RBC # FLD: 13.8 % — SIGNIFICANT CHANGE UP (ref 11.5–14.5)
RCV VOL RI: HIGH /UL (ref 0–0)
SODIUM SERPL-SCNC: 134 MMOL/L — LOW (ref 135–146)
TOTAL NUCLEATED CELL COUNT, BODY FLUID: 955 /UL — SIGNIFICANT CHANGE UP
TUBE TYPE: SIGNIFICANT CHANGE UP
WBC # BLD: 9.52 K/UL — SIGNIFICANT CHANGE UP (ref 4.8–10.8)
WBC # FLD AUTO: 9.52 K/UL — SIGNIFICANT CHANGE UP (ref 4.8–10.8)

## 2021-03-15 PROCEDURE — 88342 IMHCHEM/IMCYTCHM 1ST ANTB: CPT | Mod: 26

## 2021-03-15 PROCEDURE — 78306 BONE IMAGING WHOLE BODY: CPT | Mod: 26

## 2021-03-15 PROCEDURE — 99233 SBSQ HOSP IP/OBS HIGH 50: CPT

## 2021-03-15 PROCEDURE — 88341 IMHCHEM/IMCYTCHM EA ADD ANTB: CPT | Mod: 26

## 2021-03-15 PROCEDURE — 71045 X-RAY EXAM CHEST 1 VIEW: CPT | Mod: 26

## 2021-03-15 PROCEDURE — 88112 CYTOPATH CELL ENHANCE TECH: CPT | Mod: 26

## 2021-03-15 PROCEDURE — 88305 TISSUE EXAM BY PATHOLOGIST: CPT | Mod: 26

## 2021-03-15 PROCEDURE — 78803 RP LOCLZJ TUM SPECT 1 AREA: CPT | Mod: 26

## 2021-03-15 PROCEDURE — 99222 1ST HOSP IP/OBS MODERATE 55: CPT

## 2021-03-15 RX ADMIN — SODIUM CHLORIDE 100 MILLILITER(S): 9 INJECTION, SOLUTION INTRAVENOUS at 03:46

## 2021-03-15 RX ADMIN — Medication 30 MILLILITER(S): at 02:51

## 2021-03-15 RX ADMIN — ENOXAPARIN SODIUM 40 MILLIGRAM(S): 100 INJECTION SUBCUTANEOUS at 11:31

## 2021-03-15 NOTE — PROGRESS NOTE ADULT - SUBJECTIVE AND OBJECTIVE BOX
DOV HENLEY 70y Female  MRN#: [id number]   Hospital Day: 2d    SUBJECTIVE  Patient is a 70y old Female who presents with a chief complaint of difficulty ambulation (15 Mar 2021 11:34)  Currently admitted to medicine with the primary diagnosis of Generalized weakness      INTERVAL HPI AND OVERNIGHT EVENTS:  Patient was examined and seen at bedside. This morning she is resting comfortably in bed and reports no issues or overnight events.    REVIEW OF SYMPTOMS:  CONSTITUTIONAL: No weakness, fevers or chills; No headaches  EYES: No visual changes, eye pain, or discharge  ENT: No vertigo; No ear pain or change in hearing; No sore throat or difficulty swallowing  NECK: No pain or stiffness  RESPIRATORY: No cough, wheezing, or hemoptysis; No shortness of breath  CARDIOVASCULAR: No chest pain or palpitations  GASTROINTESTINAL: No abdominal or epigastric pain; No nausea, vomiting, or hematemesis; No diarrhea or constipation; No melena or hematochezia  GENITOURINARY: No dysuria, frequency or hematuria  MUSCULOSKELETAL: No joint pain, no muscle pain, no weakness  NEUROLOGICAL: No numbness or weakness  SKIN: No itching or rashes    OBJECTIVE  PAST MEDICAL & SURGICAL HISTORY  Diverticulosis    Gout    OA (osteoarthritis)    H/O total hysterectomy      ALLERGIES:  moxifloxacin (Anaphylaxis (Moderate))    MEDICATIONS:  STANDING MEDICATIONS  enoxaparin Injectable 40 milliGRAM(s) SubCutaneous daily    PRN MEDICATIONS  acetaminophen   Tablet .. 650 milliGRAM(s) Oral every 6 hours PRN  aluminum hydroxide/magnesium hydroxide/simethicone Suspension 30 milliLiter(s) Oral every 4 hours PRN      VITAL SIGNS: Last 24 Hours  T(C): 35.7 (15 Mar 2021 08:17), Max: 36 (14 Mar 2021 16:51)  T(F): 96.3 (15 Mar 2021 08:17), Max: 96.8 (14 Mar 2021 16:51)  HR: 88 (15 Mar 2021 08:17) (88 - 107)  BP: 164/97 (15 Mar 2021 08:17) (149/76 - 164/97)  BP(mean): --  RR: 18 (15 Mar 2021 08:17) (18 - 22)  SpO2: 94% (14 Mar 2021 18:56) (84% - 94%)    LABS:                        10.1   9.52  )-----------( 172      ( 15 Mar 2021 06:18 )             30.8     03-15    134<L>  |  98  |  16  ----------------------------<  93  4.4   |  24  |  0.6<L>    Ca    9.7      15 Mar 2021 06:18  Phos  4.1     03-14  Mg     1.8     03-15    TPro  6.2  /  Alb  3.3<L>  /  TBili  0.5  /  DBili  x   /  AST  17  /  ALT  9   /  AlkPhos  108  03-15      Urinalysis Basic - ( 14 Mar 2021 08:00 )    Color: Yellow / Appearance: Slightly Turbid / S.027 / pH: x  Gluc: x / Ketone: Trace  / Bili: Negative / Urobili: <2 mg/dL   Blood: x / Protein: Trace / Nitrite: Positive   Leuk Esterase: Negative / RBC: 1 /HPF / WBC 2 /HPF   Sq Epi: x / Non Sq Epi: 4 /HPF / Bacteria: Many      PHYSICAL EXAM:  CONSTITUTIONAL: No acute distress, well-developed, well-groomed, AAOx3  HEAD: Atraumatic, normocephalic  EYES: EOM intact, PERRLA, conjunctiva and sclera clear  PULMONARY: equal air entry bilaterally   CARDIOVASCULAR: Regular rate and rhythm  GASTROINTESTINAL: Soft, non-tender, non-distended  MUSCULOSKELETAL: no edema, no pain with movement   NEUROLOGY: non-focal  SKIN: No rashes or lesions    ASSESSMENT & PLAN  70 years old female from home and lives alone with PMHx of OA of bilateral knees, right rotator ruff tear (?), gout, diverticulosis, presented with difficulty ambulation due to worsening pain on hips and shoulders.      # Right Perihilar Mass w/ large left pleural effusion   - CT Chest No Cont (21 @ 18:33): Poorly defined malignant right upper lobe mass extending into the right hilum and mediastinum with occlusion of the right upper lobe airways. Metastatic osteolytic right scapular lesion. Acute mildly displaced left glenoid fracture and age-indeterminate T11 compression deformity, underlying pathologic fractures are not excluded. Left greater than right bilateral adrenal nodularity, likely metastatic.  - Thoracentesis today: 1L serosanguinous removed, pending analysis for malignant cells  - Echo pending   - Pulm consult for possible bronchoscopy to biopsy mass   - Bone Scan to determine extent of pepe metastasis       # Nondisplaced femoral neck subcapital impaction fracture  - Pain control with Tylenol   - CT Pelvis Bony Only No Cont (21 @ 22:01): Multiple infiltrative bone lesions, largest at the right iliac crest with nondisplaced pathologic fracture and soft tissue mass extension. Findings are compatible with osseous metastasis. Consider nuclear medicine bone scan for the assessment of skeletal metastasis. Nondisplaced right subcapital femoral neck impaction fracture. Consider MRI of the right hip to evaluate extent of disease.  - Per Ortho patient will likely need transfer to [facility] due to fracture likely 2/2 to mets     # Mild hypercalcemia - resolved   - Ca 10.4, albumin 4.4  - Likely secondary to dehydration (patient reports not eating or drinking much because it was difficult for her to get around)  - Continue gentle IVF for now    # Gout  - Not on any medication  - Will avoid red meat diet while inpatient      DVT ppx: Lovenox 40mg qdaily  GI ppx: Protonix  Diet: Regular  Activity: Increase as tolerated   Ambulatory status: Ambulate with walker  Lines: Peripheral IVs  Code status: FULL CODE  Dispo: acute, will need proper placement

## 2021-03-15 NOTE — PROGRESS NOTE ADULT - SUBJECTIVE AND OBJECTIVE BOX
Patient's CT and whole body bone scan bone scan reviewed. Patient has a right femoral neck fracture in the setting of numerous lytic bone lesions suspicious for metastatic disease. Patient requires a full metastatic work up and evaluation with fixation of R femoral neck fracture. Patient would benefit from transfer and management with an orthopedic oncologist. Discussion was had with Dr. Javier Lord at [facility] regarding the further treatment of this patient

## 2021-03-15 NOTE — PROCEDURE NOTE - NSPROCDETAILS_GEN_ALL_CORE
right side/location identified, draped/prepped, sterile technique used, needle inserted/introduced/ultrasound assessment of effusion (localization)

## 2021-03-15 NOTE — CONSULT NOTE ADULT - SUBJECTIVE AND OBJECTIVE BOX
Patient is a 70y old  Female who presents with a chief complaint of difficulty ambulation (14 Mar 2021 13:51)      HPI:  70 years old female from home and lives alone with PMHx of OA of bilateral knees, right rotator ruff tear (?), gout, diverticulosis, presented with difficulty ambulation. Patient started to have worsening bilateral hip pain, and right shoulder pain, severely hindering her daily activities. She uses a walker to ambulate around her house but it has been getting more and more difficult complete her daily routine due to worsening pain. She also feels short of breath after exertion.     Patient also has been having poor appetite, nausea, and constipation. She did not weight herself regularly but her sister noticed her clothes are getting looser. She did not have EGD in the past but her last colonoscopy in 2020 was unrevealing, only some small polyps.    She denies fever/chills, cough, chest pain, abdominal pain, vomiting, diarrhea, or urinary symptoms.    Called PCP Dr. Urbina, he confirmed patient's history but he is worried that she might need psychiatric evaluation due to recent stress (her pet passed away and she locked himself in her house for awhile). (13 Mar 2021 15:17)      PAST MEDICAL & SURGICAL HISTORY:  Diverticulosis    Gout    OA (osteoarthritis)    H/O total hysterectomy        SOCIAL HX:   Smoking   former smoker, quit 35 yrs ago, 1.5ppd for 20 yrs                      ETOH     denies                      Other denies illicit drug use, from home, ambulates with walker but has trouble carrying out ADL's independently, denies travel outside country in past 6 months, retired, was an activity therapist    FAMILY HISTORY:  .  No cardiovascular or pulmonary family history     REVIEW OF SYSTEMS:    All ROS are negative except per HPI       Allergies    moxifloxacin (Anaphylaxis (Moderate))    Intolerances          PHYSICAL EXAM  Vital Signs Last 24 Hrs  T(C): 35.7 (15 Mar 2021 08:17), Max: 36 (14 Mar 2021 16:51)  T(F): 96.3 (15 Mar 2021 08:17), Max: 96.8 (14 Mar 2021 16:51)  HR: 88 (15 Mar 2021 08:17) (88 - 107)  BP: 164/97 (15 Mar 2021 08:17) (149/76 - 164/97)  RR: 18 (15 Mar 2021 08:17) (18 - 22)  SpO2: 94% (14 Mar 2021 18:56) (84% - 94%)    CONSTITUTIONAL:  Elderly appearing. Well nourished.  NAD    ENT:   Airway patent,   No thrush    EYES:   Clear bilaterally,   pupils equal,   round and reactive to light.    CARDIAC:   Normal rate,   regular rhythm.    no edema    RESPIRATORY:   NC  Inspection- normal chest wall symmetry  Palpation- normal chest wall expansion  Auscultation- absent breath sounds on right  Lung US- moderate to large right pleural effusion  No wheezing   Not tachypneic,  No use of accessory muscles    GASTROINTESTINAL:  Abdomen soft, non-tender,   No guarding,   Positive BS    MUSCULOSKELETAL:   Range of motion is not limited,  No clubbing, cyanosis    NEUROLOGICAL:   AOx4  Follows commands  Moves all extremities   Alert and oriented   No motor deficits.    SKIN:   Skin normal color for race,   No evidence of rash.            LABS:                          10.1   9.52  )-----------( 172      ( 15 Mar 2021 06:18 )             30.8                                               03-15    134<L>  |  98  |  16  ----------------------------<  93  4.4   |  24  |  0.6<L>    Ca    9.7      15 Mar 2021 06:18  Phos  4.1     03-14  Mg     1.8     03-15    TPro  6.2  /  Alb  3.3<L>  /  TBili  0.5  /  DBili  x   /  AST  17  /  ALT  9   /  AlkPhos  108  03-15                                             Urinalysis Basic - ( 14 Mar 2021 08:00 )    Color: Yellow / Appearance: Slightly Turbid / S.027 / pH: x  Gluc: x / Ketone: Trace  / Bili: Negative / Urobili: <2 mg/dL   Blood: x / Protein: Trace / Nitrite: Positive   Leuk Esterase: Negative / RBC: 1 /HPF / WBC 2 /HPF   Sq Epi: x / Non Sq Epi: 4 /HPF / Bacteria: Many                                                  LIVER FUNCTIONS - ( 15 Mar 2021 06:18 )  Alb: 3.3 g/dL / Pro: 6.2 g/dL / ALK PHOS: 108 U/L / ALT: 9 U/L / AST: 17 U/L / GGT: x                                                                                                MEDICATIONS  (STANDING):  enoxaparin Injectable 40 milliGRAM(s) SubCutaneous daily    MEDICATIONS  (PRN):  acetaminophen   Tablet .. 650 milliGRAM(s) Oral every 6 hours PRN Mild Pain (1 - 3)  aluminum hydroxide/magnesium hydroxide/simethicone Suspension 30 milliLiter(s) Oral every 4 hours PRN Dyspepsia      X-Rays reviewed:    CXR interpreted by me: hilar mass and left pleural effusion Patient is a 70y old  Female who presents with a chief complaint of difficulty ambulation (14 Mar 2021 13:51)      HPI:  70 years old female from home and lives alone with PMHx of OA of bilateral knees, right rotator ruff tear (?), gout, diverticulosis, presented with difficulty ambulation. Patient started to have worsening bilateral hip pain, and right shoulder pain, severely hindering her daily activities. She uses a walker to ambulate around her house but it has been getting more and more difficult complete her daily routine due to worsening pain. She also feels short of breath after exertion.     Patient also has been having poor appetite, nausea, and constipation. She did not weight herself regularly but her sister noticed her clothes are getting looser. She did not have EGD in the past but her last colonoscopy in 2020 was unrevealing, only some small polyps.    She denies fever/chills, cough, chest pain, abdominal pain, vomiting, diarrhea, or urinary symptoms.    Called PCP Dr. Urbina, he confirmed patient's history but he is worried that she might need psychiatric evaluation due to recent stress (her pet passed away and she locked himself in her house for awhile). had Ct CHEST, CT PELVIS SUSPECT MALIGNANCY      PAST MEDICAL & SURGICAL HISTORY:  Diverticulosis    Gout    OA (osteoarthritis)    H/O total hysterectomy        SOCIAL HX:   Smoking   former smoker, quit 35 yrs ago, 1.5ppd for 20 yrs                      ETOH     denies                      Other denies illicit drug use, from home, ambulates with walker but has trouble carrying out ADL's independently, denies travel outside country in past 6 months, retired, was an activity therapist    FAMILY HISTORY:  .  No cardiovascular or pulmonary family history     REVIEW OF SYSTEMS:    All ROS are negative except per HPI       Allergies    moxifloxacin (Anaphylaxis (Moderate))    Intolerances          PHYSICAL EXAM  Vital Signs Last 24 Hrs  T(C): 35.7 (15 Mar 2021 08:17), Max: 36 (14 Mar 2021 16:51)  T(F): 96.3 (15 Mar 2021 08:17), Max: 96.8 (14 Mar 2021 16:51)  HR: 88 (15 Mar 2021 08:17) (88 - 107)  BP: 164/97 (15 Mar 2021 08:17) (149/76 - 164/97)  RR: 18 (15 Mar 2021 08:17) (18 - 22)  SpO2: 94% (14 Mar 2021 18:56) (84% - 94%)    CONSTITUTIONAL:  Elderly appearing.    ENT:   Airway patent,   No thrush    EYES:   Clear bilaterally,   pupils equal,   round and reactive to light.    CARDIAC:   Normal rate,   regular rhythm.    no edema    RESPIRATORY:   NC  DEC BS R BASE    GASTROINTESTINAL:  Abdomen soft, non-tender,   No guarding,   Positive BS    MUSCULOSKELETAL:   Range of motion is not limited,  No clubbing, cyanosis    NEUROLOGICAL:   AOx4  Follows commands  Moves all extremities   Alert and oriented   No motor deficits.    SKIN:   Skin normal color for race,   No evidence of rash.            LABS:                          10.1   9.52  )-----------( 172      ( 15 Mar 2021 06:18 )             30.8                                               03-15    134<L>  |  98  |  16  ----------------------------<  93  4.4   |  24  |  0.6<L>    Ca    9.7      15 Mar 2021 06:18  Phos  4.1     03-14  Mg     1.8     03-15    TPro  6.2  /  Alb  3.3<L>  /  TBili  0.5  /  DBili  x   /  AST  17  /  ALT  9   /  AlkPhos  108  03-15                                             Urinalysis Basic - ( 14 Mar 2021 08:00 )    Color: Yellow / Appearance: Slightly Turbid / S.027 / pH: x  Gluc: x / Ketone: Trace  / Bili: Negative / Urobili: <2 mg/dL   Blood: x / Protein: Trace / Nitrite: Positive   Leuk Esterase: Negative / RBC: 1 /HPF / WBC 2 /HPF   Sq Epi: x / Non Sq Epi: 4 /HPF / Bacteria: Many                                                  LIVER FUNCTIONS - ( 15 Mar 2021 06:18 )  Alb: 3.3 g/dL / Pro: 6.2 g/dL / ALK PHOS: 108 U/L / ALT: 9 U/L / AST: 17 U/L / GGT: x                                                                                                MEDICATIONS  (STANDING):  enoxaparin Injectable 40 milliGRAM(s) SubCutaneous daily    MEDICATIONS  (PRN):  acetaminophen   Tablet .. 650 milliGRAM(s) Oral every 6 hours PRN Mild Pain (1 - 3)  aluminum hydroxide/magnesium hydroxide/simethicone Suspension 30 milliLiter(s) Oral every 4 hours PRN Dyspepsia      X-Rays revieweD

## 2021-03-15 NOTE — CONSULT NOTE ADULT - ASSESSMENT
Impression  RUL mass extending into right hilum  Moderate to large left pleural effusion, likely malignancy    Recommendations  s/p thora, 1000cc serosanguinous fluid removed  FU Pleural studies  ECHO  HOB at 45  Aspiration precautions  DVT ppx Impression    RUL mass extending into right hilum  Moderate to large left pleural effusion, likely malignancy  Bone mets    Recommendations    s/p thora, 1000cc serosanguinous fluid removed  FU Pleural studies/ cyto  if cyto neg IR for biopsy pelvic mass  ECHO  HOB at 45  Aspiration precautions  DVT ppx  poor prognosis  spoke with sister

## 2021-03-16 ENCOUNTER — TRANSCRIPTION ENCOUNTER (OUTPATIENT)
Age: 71
End: 2021-03-16

## 2021-03-16 LAB
ALBUMIN FLD-MCNC: 2.3 G/DL — SIGNIFICANT CHANGE UP
ALBUMIN SERPL ELPH-MCNC: 3.2 G/DL — LOW (ref 3.5–5.2)
ALP SERPL-CCNC: 112 U/L — SIGNIFICANT CHANGE UP (ref 30–115)
ALT FLD-CCNC: 8 U/L — SIGNIFICANT CHANGE UP (ref 0–41)
ANION GAP SERPL CALC-SCNC: 9 MMOL/L — SIGNIFICANT CHANGE UP (ref 7–14)
AST SERPL-CCNC: 15 U/L — SIGNIFICANT CHANGE UP (ref 0–41)
BASOPHILS # BLD AUTO: 0.04 K/UL — SIGNIFICANT CHANGE UP (ref 0–0.2)
BASOPHILS NFR BLD AUTO: 0.4 % — SIGNIFICANT CHANGE UP (ref 0–1)
BILIRUB SERPL-MCNC: 0.4 MG/DL — SIGNIFICANT CHANGE UP (ref 0.2–1.2)
BUN SERPL-MCNC: 14 MG/DL — SIGNIFICANT CHANGE UP (ref 10–20)
CALCIUM SERPL-MCNC: 9.3 MG/DL — SIGNIFICANT CHANGE UP (ref 8.5–10.1)
CHLORIDE SERPL-SCNC: 96 MMOL/L — LOW (ref 98–110)
CO2 SERPL-SCNC: 29 MMOL/L — SIGNIFICANT CHANGE UP (ref 17–32)
CREAT SERPL-MCNC: 0.5 MG/DL — LOW (ref 0.7–1.5)
EOSINOPHIL # BLD AUTO: 0.24 K/UL — SIGNIFICANT CHANGE UP (ref 0–0.7)
EOSINOPHIL NFR BLD AUTO: 2.2 % — SIGNIFICANT CHANGE UP (ref 0–8)
GLUCOSE FLD-MCNC: 10 MG/DL — SIGNIFICANT CHANGE UP
GLUCOSE SERPL-MCNC: 97 MG/DL — SIGNIFICANT CHANGE UP (ref 70–99)
GRAM STN FLD: SIGNIFICANT CHANGE UP
HCT VFR BLD CALC: 30.5 % — LOW (ref 37–47)
HGB BLD-MCNC: 10.2 G/DL — LOW (ref 12–16)
IMM GRANULOCYTES NFR BLD AUTO: 1.3 % — HIGH (ref 0.1–0.3)
LDH SERPL L TO P-CCNC: 1732 U/L — SIGNIFICANT CHANGE UP
LYMPHOCYTES # BLD AUTO: 1.45 K/UL — SIGNIFICANT CHANGE UP (ref 1.2–3.4)
LYMPHOCYTES # BLD AUTO: 13.3 % — LOW (ref 20.5–51.1)
MAGNESIUM SERPL-MCNC: 1.8 MG/DL — SIGNIFICANT CHANGE UP (ref 1.8–2.4)
MCHC RBC-ENTMCNC: 31.4 PG — HIGH (ref 27–31)
MCHC RBC-ENTMCNC: 33.4 G/DL — SIGNIFICANT CHANGE UP (ref 32–37)
MCV RBC AUTO: 93.8 FL — SIGNIFICANT CHANGE UP (ref 81–99)
MONOCYTES # BLD AUTO: 0.94 K/UL — HIGH (ref 0.1–0.6)
MONOCYTES NFR BLD AUTO: 8.6 % — SIGNIFICANT CHANGE UP (ref 1.7–9.3)
NEUTROPHILS # BLD AUTO: 8.13 K/UL — HIGH (ref 1.4–6.5)
NEUTROPHILS NFR BLD AUTO: 74.2 % — SIGNIFICANT CHANGE UP (ref 42.2–75.2)
NON-GYNECOLOGICAL CYTOLOGY STUDY: SIGNIFICANT CHANGE UP
NRBC # BLD: 0 /100 WBCS — SIGNIFICANT CHANGE UP (ref 0–0)
PH FLD: 7.7 — SIGNIFICANT CHANGE UP
PLATELET # BLD AUTO: 185 K/UL — SIGNIFICANT CHANGE UP (ref 130–400)
POTASSIUM SERPL-MCNC: 4.6 MMOL/L — SIGNIFICANT CHANGE UP (ref 3.5–5)
POTASSIUM SERPL-SCNC: 4.6 MMOL/L — SIGNIFICANT CHANGE UP (ref 3.5–5)
PROT FLD-MCNC: 4 G/DL — SIGNIFICANT CHANGE UP
PROT SERPL-MCNC: 5.9 G/DL — LOW (ref 6–8)
RBC # BLD: 3.25 M/UL — LOW (ref 4.2–5.4)
RBC # FLD: 13.3 % — SIGNIFICANT CHANGE UP (ref 11.5–14.5)
SODIUM SERPL-SCNC: 134 MMOL/L — LOW (ref 135–146)
SPECIMEN SOURCE: SIGNIFICANT CHANGE UP
WBC # BLD: 10.94 K/UL — HIGH (ref 4.8–10.8)
WBC # FLD AUTO: 10.94 K/UL — HIGH (ref 4.8–10.8)

## 2021-03-16 PROCEDURE — 99233 SBSQ HOSP IP/OBS HIGH 50: CPT

## 2021-03-16 PROCEDURE — 93306 TTE W/DOPPLER COMPLETE: CPT | Mod: 26

## 2021-03-16 PROCEDURE — 71045 X-RAY EXAM CHEST 1 VIEW: CPT | Mod: 26

## 2021-03-16 RX ORDER — CHLORHEXIDINE GLUCONATE 213 G/1000ML
1 SOLUTION TOPICAL
Refills: 0 | Status: DISCONTINUED | OUTPATIENT
Start: 2021-03-16 | End: 2021-03-19

## 2021-03-16 RX ADMIN — Medication 650 MILLIGRAM(S): at 15:45

## 2021-03-16 RX ADMIN — Medication 30 MILLILITER(S): at 06:32

## 2021-03-16 RX ADMIN — Medication 650 MILLIGRAM(S): at 14:31

## 2021-03-16 RX ADMIN — ENOXAPARIN SODIUM 40 MILLIGRAM(S): 100 INJECTION SUBCUTANEOUS at 12:02

## 2021-03-16 NOTE — DISCHARGE NOTE PROVIDER - HOSPITAL COURSE
70 years old female from home and lives alone with PMHx of OA of bilateral knees, right rotator ruff tear (?), gout, diverticulosis, presented with difficulty ambulation. Patient started to have worsening bilateral hip pain, and right shoulder pain, severely hindering her daily activities. She uses a walker to ambulate around her house but it has been getting more and more difficult complete her daily routine due to worsening pain. She also feels short of breath after exertion.  Patient also has been having poor appetite, nausea, and constipation. She did not weight herself regularly but her sister noticed her clothes are getting looser. She did not have EGD in the past but her last colonoscopy in Feb 2020 was unrevealing, only some small polyps. She denies fever/chills, cough, chest pain, abdominal pain, vomiting, diarrhea, or urinary symptoms. Called PCP Dr. Urbina, he confirmed patient's history but he is worried that she might need psychiatric evaluation due to recent stress (her pet passed away and she locked himself in her house for awhile).

## 2021-03-16 NOTE — DISCHARGE NOTE PROVIDER - DETAILS OF MALNUTRITION DIAGNOSIS/DIAGNOSES
This patient has been assessed with a concern for Malnutrition and was treated during this hospitalization for the following Nutrition diagnosis/diagnoses:     -  03/18/2021: Moderate protein-calorie malnutrition

## 2021-03-16 NOTE — DISCHARGE NOTE PROVIDER - PROVIDER TOKENS
PROVIDER:[TOKEN:[[id number]],FOLLOWUP:[2 weeks],ESTABLISHEDPATIENT:[T]] PROVIDER:[TOKEN:[[id number]],FOLLOWUP:[2 weeks],ESTABLISHEDPATIENT:[T]],PROVIDER:[TOKEN:[[id number]],FOLLOWUP:[2 weeks],ESTABLISHEDPATIENT:[T]]

## 2021-03-16 NOTE — PROGRESS NOTE ADULT - SUBJECTIVE AND OBJECTIVE BOX
discussed case with dr abdalla at [facility]   dr abdalla agreed transfer to [facility]  for hip surgery   discussed at bedside with patient  at this time patient requests a discussion with oncology in regards to prognosis and treatment options before deciding on hip surgery   fu hem-onc

## 2021-03-16 NOTE — PROGRESS NOTE ADULT - SUBJECTIVE AND OBJECTIVE BOX
Orthopaedics Progress Note    DOV HENLEY  [id number]    Patient is a 70y year old Female with right femoral neck fracture and multiple areas of lytic bone lesions  Ongoing oncology workup in progress  Case discussed with Dr. Lord of [facility] - he is accepting the patient for transfer for further orthopaedic oncology care  Patient seen and examined bedside  Pain well controlled  No other complaints    acetaminophen   Tablet .. 650 milliGRAM(s) Oral every 6 hours PRN  enoxaparin Injectable 40 milliGRAM(s) SubCutaneous daily      T(C): 36.3 (03-16-21 @ 00:00), Max: 36.3 (03-16-21 @ 00:00)  HR: 104 (03-16-21 @ 00:00) (88 - 107)  BP: 167/79 (03-16-21 @ 00:00) (134/70 - 167/79)  RR: 19 (03-16-21 @ 00:00) (18 - 19)  SpO2: --    Physical Exam  NAD  Breathing on NC O2  LUE  NTTP L shoulder  No deformity/laceration/abrasion noted  No swelling/erythema/ecchymosis noted  Motor: AIN/PIN/Ulnar intact  Sensory: Ax/M/R/U intact  Vasc: hand WWP, 2+ radial pulse  RLE  No pain with log roll, axial load, or passive ROM  Able to weakly maintain SLR  No deformity/laceration/abrasion noted  No swelling/erythema/ecchymosis noted  Motor: TA/EHL/Gastroc intact  Sensory: SP/DP/Hernandez/Sa intact  Vasc: foot WWP, 2+ DP pulse    Labs                        10.1   9.52  )-----------( 172      ( 15 Mar 2021 06:18 )             30.8     03-15    134<L>  |  98  |  16  ----------------------------<  93  4.4   |  24  |  0.6<L>    Ca    9.7      15 Mar 2021 06:18  Mg     1.8     03-15    TPro  6.2  /  Alb  3.3<L>  /  TBili  0.5  /  DBili  x   /  AST  17  /  ALT  9   /  AlkPhos  108  03-15    LIVER FUNCTIONS - ( 15 Mar 2021 06:18 )  Alb: 3.3 g/dL / Pro: 6.2 g/dL / ALK PHOS: 108 U/L / ALT: 9 U/L / AST: 17 U/L / GGT: x             A/P: Patient is a 70y year old Female as above    Patient's CT and whole body bone scan bone scan reviewed. Patient has a right femoral neck fracture in the setting of numerous lytic bone lesions suspicious for metastatic disease. Patient requires a full metastatic work up and evaluation with fixation of R femoral neck fracture. Patient would benefit from transfer and management with an orthopedic oncologist. Discussion was had with Dr. Javier Lord at [facility] regarding the further treatment of this patient  NWB on RLE  Ortho to follow

## 2021-03-16 NOTE — DISCHARGE NOTE PROVIDER - CARE PROVIDERS DIRECT ADDRESSES
,DirectAddress_Unknown ,DirectAddress_Unknown,[email or web address]

## 2021-03-16 NOTE — DISCHARGE NOTE PROVIDER - NSDCCPCAREPLAN_GEN_ALL_CORE_FT
PRINCIPAL DISCHARGE DIAGNOSIS  Diagnosis: Pathological fracture of other site, unspecified pathological cause, initial encounter  Assessment and Plan of Treatment: You initially presented to the hospital with difficulty ambulating. Imaging studies including X-Ray and CT scan revieled a right femoral fracture which appears to be secondary to an underlying malignancy. You were found to have a poorly defined lung mass which appeared on both the CT scan and X-ray with a large collection of fluid in the right lung. The Pulmonary team was consulted and drained 1 liter of fluid from the right lung. This fluid was sent to the lab for analysis to determine the underlying cause of this mass and fluid collection. You will be transfered to [facility] to undergo surgery for fixation of the right hip with a specialized surgeon. At this hospital they will continue your treatment and workup to determine the underlying casue of the ct scan findings.

## 2021-03-16 NOTE — PROGRESS NOTE ADULT - SUBJECTIVE AND OBJECTIVE BOX
DOV HENLEY 70y Female  MRN#: [id number]   Hospital Day: 3d    SUBJECTIVE  Patient is a 70y old Female who presents with a chief complaint of difficulty ambulation (16 Mar 2021 07:12)  Currently admitted to medicine with the primary diagnosis of Generalized weakness    INTERVAL HPI AND OVERNIGHT EVENTS:  Patient was examined and seen at bedside. This morning she is resting comfortably in bed and reports no issues or overnight events.    OBJECTIVE  PAST MEDICAL & SURGICAL HISTORY  Diverticulosis  Gout  OA (osteoarthritis)  H/O total hysterectomy      ALLERGIES:  moxifloxacin (Anaphylaxis (Moderate))    MEDICATIONS:  STANDING MEDICATIONS  chlorhexidine 4% Liquid 1 Application(s) Topical <User Schedule>  enoxaparin Injectable 40 milliGRAM(s) SubCutaneous daily    PRN MEDICATIONS  acetaminophen   Tablet .. 650 milliGRAM(s) Oral every 6 hours PRN      VITAL SIGNS: Last 24 Hours  T(C): 36.3 (16 Mar 2021 07:15), Max: 36.3 (16 Mar 2021 00:00)  T(F): 97.4 (16 Mar 2021 07:15), Max: 97.4 (16 Mar 2021 00:00)  HR: 69 (16 Mar 2021 07:15) (69 - 107)  BP: 104/74 (16 Mar 2021 07:15) (104/74 - 167/79)  BP(mean): --  RR: 19 (16 Mar 2021 07:15) (18 - 19)  SpO2: 96% (16 Mar 2021 07:15) (96% - 96%)    LABS:                        10.2   10.94 )-----------( 185      ( 16 Mar 2021 06:14 )             30.5     03-16    134<L>  |  96<L>  |  14  ----------------------------<  97  4.6   |  29  |  0.5<L>    Ca    9.3      16 Mar 2021 06:14  Mg     1.8     03-16    TPro  5.9<L>  /  Alb  3.2<L>  /  TBili  0.4  /  DBili  x   /  AST  15  /  ALT  8   /  AlkPhos  112  03-16      Culture - Body Fluid with Gram Stain (collected 15 Mar 2021 12:30)  Source: .Body Fluid Pleural Fluid  Gram Stain (16 Mar 2021 00:49):    No polymorphonuclear leukocytes seen    No organisms seen    by cytocentrifuge      PHYSICAL EXAM:  CONSTITUTIONAL: No acute distress, well-developed, well-groomed, AAOx3  HEAD: Atraumatic, normocephalic  EYES: EOM intact, PERRLA, conjunctiva and sclera clear  PULMONARY: Clear to auscultation bilaterally  CARDIOVASCULAR: Regular rate and rhythm  GASTROINTESTINAL: Soft, non-tender, non-distended  MUSCULOSKELETAL: no edema  NEUROLOGY: non-focal  SKIN: No rashes or lesions    ASSESSMENT & PLAN  70 years old female from home and lives alone with PMHx of OA of bilateral knees, right rotator ruff tear (?), gout, diverticulosis, presented with difficulty ambulation due to worsening pain on hips and shoulders.    # Right Perihilar Mass w/ large left pleural effusion   - CT Chest No Cont (03.14.21 @ 18:33): Poorly defined malignant right upper lobe mass extending into the right hilum and mediastinum with occlusion of the right upper lobe airways. Metastatic osteolytic right scapular lesion. Acute mildly displaced left glenoid fracture and age-indeterminate T11 compression deformity, underlying pathologic fractures are not excluded. Left greater than right bilateral adrenal nodularity, likely metastatic.  - Thoracentesis today: 1L serosanguinous removed, pending analysis for malignant cells  - Echo pending   - Pulm consult: s/p thoracentesis, pending results   - Bone Scan completed, see official report    - per ortho patient will need transfer to [facility] for surgery under Dr. Vieira     # Nondisplaced femoral neck subcapital impaction fracture  - Pain control with Tylenol   - CT Pelvis Bony Only No Cont (03.14.21 @ 22:01): Multiple infiltrative bone lesions, largest at the right iliac crest with nondisplaced pathologic fracture and soft tissue mass extension. Findings are compatible with osseous metastasis. Consider nuclear medicine bone scan for the assessment of skeletal metastasis. Nondisplaced right subcapital femoral neck impaction fracture. Consider MRI of the right hip to evaluate extent of disease.  - Per Ortho patient will likely need transfer to [facility] due to fracture likely 2/2 to mets     # Mild hypercalcemia - resolved   - Ca 10.4, albumin 4.4  - Likely secondary to dehydration (patient reports not eating or drinking much because it was difficult for her to get around)  - Continue gentle IVF for now    # Gout  - Not on any medication  - Will avoid red meat diet while inpatient    DVT ppx: Lovenox 40mg qdaily  GI ppx: Protonix  Diet: Regular  Activity: Increase as tolerated   Ambulatory status: bedrest, NBW RLE   Lines: Peripheral IVs  Code status: FULL CODE  Dispo: acute

## 2021-03-16 NOTE — DISCHARGE NOTE PROVIDER - CARE PROVIDER_API CALL
Roderick Urbina  INTERNAL MEDICINE  [address] [place] [address]  Phone: [phone]  Fax: [phone]  Established Patient  Follow Up Time: 2 weeks   Roderick Urbina  INTERNAL MEDICINE  [address] [place] [address]  Phone: [phone]  Fax: [phone]  Established Patient  Follow Up Time: 2 weeks    Scott Chen)  Hematology; Internal Medicine; Medical Oncology  [address] [place] [address]  Phone: [phone]  Fax: [phone]  Established Patient  Follow Up Time: 2 weeks

## 2021-03-17 LAB
ALBUMIN SERPL ELPH-MCNC: 3.1 G/DL — LOW (ref 3.5–5.2)
ALP SERPL-CCNC: 116 U/L — HIGH (ref 30–115)
ALT FLD-CCNC: 10 U/L — SIGNIFICANT CHANGE UP (ref 0–41)
ANION GAP SERPL CALC-SCNC: 10 MMOL/L — SIGNIFICANT CHANGE UP (ref 7–14)
AST SERPL-CCNC: 17 U/L — SIGNIFICANT CHANGE UP (ref 0–41)
BILIRUB SERPL-MCNC: 0.3 MG/DL — SIGNIFICANT CHANGE UP (ref 0.2–1.2)
BUN SERPL-MCNC: 12 MG/DL — SIGNIFICANT CHANGE UP (ref 10–20)
CALCIUM SERPL-MCNC: 9.3 MG/DL — SIGNIFICANT CHANGE UP (ref 8.5–10.1)
CHLORIDE SERPL-SCNC: 93 MMOL/L — LOW (ref 98–110)
CO2 SERPL-SCNC: 30 MMOL/L — SIGNIFICANT CHANGE UP (ref 17–32)
CREAT SERPL-MCNC: 0.5 MG/DL — LOW (ref 0.7–1.5)
GLUCOSE SERPL-MCNC: 103 MG/DL — HIGH (ref 70–99)
HCT VFR BLD CALC: 31 % — LOW (ref 37–47)
HGB BLD-MCNC: 10 G/DL — LOW (ref 12–16)
MCHC RBC-ENTMCNC: 30.5 PG — SIGNIFICANT CHANGE UP (ref 27–31)
MCHC RBC-ENTMCNC: 32.3 G/DL — SIGNIFICANT CHANGE UP (ref 32–37)
MCV RBC AUTO: 94.5 FL — SIGNIFICANT CHANGE UP (ref 81–99)
NRBC # BLD: 0 /100 WBCS — SIGNIFICANT CHANGE UP (ref 0–0)
PLATELET # BLD AUTO: 192 K/UL — SIGNIFICANT CHANGE UP (ref 130–400)
POTASSIUM SERPL-MCNC: 4.6 MMOL/L — SIGNIFICANT CHANGE UP (ref 3.5–5)
POTASSIUM SERPL-SCNC: 4.6 MMOL/L — SIGNIFICANT CHANGE UP (ref 3.5–5)
PROT SERPL-MCNC: 5.8 G/DL — LOW (ref 6–8)
RBC # BLD: 3.28 M/UL — LOW (ref 4.2–5.4)
RBC # FLD: 13.4 % — SIGNIFICANT CHANGE UP (ref 11.5–14.5)
SODIUM SERPL-SCNC: 133 MMOL/L — LOW (ref 135–146)
WBC # BLD: 11.01 K/UL — HIGH (ref 4.8–10.8)
WBC # FLD AUTO: 11.01 K/UL — HIGH (ref 4.8–10.8)

## 2021-03-17 PROCEDURE — 99222 1ST HOSP IP/OBS MODERATE 55: CPT

## 2021-03-17 PROCEDURE — 99233 SBSQ HOSP IP/OBS HIGH 50: CPT

## 2021-03-17 RX ORDER — IOHEXOL 300 MG/ML
30 INJECTION, SOLUTION INTRAVENOUS ONCE
Refills: 0 | Status: COMPLETED | OUTPATIENT
Start: 2021-03-17 | End: 2021-03-17

## 2021-03-17 RX ORDER — ONDANSETRON 8 MG/1
4 TABLET, FILM COATED ORAL ONCE
Refills: 0 | Status: COMPLETED | OUTPATIENT
Start: 2021-03-17 | End: 2021-03-17

## 2021-03-17 RX ADMIN — CHLORHEXIDINE GLUCONATE 1 APPLICATION(S): 213 SOLUTION TOPICAL at 06:15

## 2021-03-17 RX ADMIN — ONDANSETRON 4 MILLIGRAM(S): 8 TABLET, FILM COATED ORAL at 22:58

## 2021-03-17 RX ADMIN — IOHEXOL 30 MILLILITER(S): 300 INJECTION, SOLUTION INTRAVENOUS at 21:20

## 2021-03-17 RX ADMIN — ENOXAPARIN SODIUM 40 MILLIGRAM(S): 100 INJECTION SUBCUTANEOUS at 11:46

## 2021-03-17 NOTE — PHYSICAL THERAPY INITIAL EVALUATION ADULT - PLANNED THERAPY INTERVENTIONS, PT EVAL
balance training/gait training/transfer training
balance training/bed mobility training/gait training/transfer training

## 2021-03-17 NOTE — PROGRESS NOTE ADULT - SUBJECTIVE AND OBJECTIVE BOX
Discussed in detail with patient and her two sisters aretha and kathia over the phone that patients hip fracture requires surgery in order for her to walk again. Patient and family understand, I answered all questions to the best of my ability and they are agreeable for transfer to [facility] to Dr.Howard Lord, Oncology Orthopedic Surgeon, for right hip ORIF.

## 2021-03-17 NOTE — CONSULT NOTE ADULT - SUBJECTIVE AND OBJECTIVE BOX
Hematology Consult Note    HPI:  70 years old female from home and lives alone with PMHx of OA of bilateral knees, right rotator ruff tear (?), gout, diverticulosis, presented with difficulty ambulation. Patient started to have worsening bilateral hip pain, and right shoulder pain, severely hindering her daily activities. She uses a walker to ambulate around her house but it has been getting more and more difficult complete her daily routine due to worsening pain. She also feels short of breath after exertion.     Patient also has been having poor appetite, nausea, and constipation. She did not weight herself regularly but her sister noticed her clothes are getting looser. She did not have EGD in the past but her last colonoscopy in Feb 2020 was unrevealing, only some small polyps.    She denies fever/chills, cough, chest pain, abdominal pain, vomiting, diarrhea, or urinary symptoms.    Called PCP Dr. Urbina, he confirmed patient's history but he is worried that she might need psychiatric evaluation due to recent stress (her pet passed away and she locked himself in her house for awhile). (13 Mar 2021 15:17)      Allergies    moxifloxacin (Anaphylaxis (Moderate))    Intolerances        MEDICATIONS  (STANDING):  chlorhexidine 4% Liquid 1 Application(s) Topical <User Schedule>  enoxaparin Injectable 40 milliGRAM(s) SubCutaneous daily    MEDICATIONS  (PRN):  acetaminophen   Tablet .. 650 milliGRAM(s) Oral every 6 hours PRN Mild Pain (1 - 3)      PAST MEDICAL & SURGICAL HISTORY:  Diverticulosis    Gout    OA (osteoarthritis)    H/O total hysterectomy        FAMILY HISTORY:      SOCIAL HISTORY: No EtOH, no tobacco    REVIEW OF SYSTEMS:    CONSTITUTIONAL: No weakness, fevers or chills  EYES/ENT: No visual changes;  No vertigo or throat pain   NECK: No pain or stiffness  RESPIRATORY: No cough, wheezing, hemoptysis; No shortness of breath  CARDIOVASCULAR: No chest pain or palpitations  GASTROINTESTINAL: No abdominal or epigastric pain. No nausea, vomiting, or hematemesis; No diarrhea or constipation. No melena or hematochezia.  GENITOURINARY: No dysuria, frequency or hematuria  NEUROLOGICAL: No numbness or weakness  SKIN: No itching, burning, rashes, or lesions   All other review of systems is negative unless indicated above.        T(F): 95.5 (03-17-21 @ 08:00), Max: 96.8 (03-16-21 @ 15:00)  HR: 97 (03-17-21 @ 08:00)  BP: 141/90 (03-17-21 @ 08:00)  RR: 18 (03-17-21 @ 08:00)  SpO2: 97% (03-17-21 @ 08:00)  Wt(kg): --    GENERAL: NAD, well-developed  HEAD:  Atraumatic, Normocephalic  EYES: EOMI, PERRLA, conjunctiva and sclera clear  NECK: Supple, No JVD  CHEST/LUNG: Clear to auscultation bilaterally; No wheeze  HEART: Regular rate and rhythm; No murmurs, rubs, or gallops  ABDOMEN: Soft, Nontender, Nondistended; Bowel sounds present  EXTREMITIES:  2+ Peripheral Pulses, No clubbing, cyanosis, or edema  NEUROLOGY: non-focal  SKIN: No rashes or lesions                          10.0   11.01 )-----------( 192      ( 17 Mar 2021 07:13 )             31.0       03-17    133<L>  |  93<L>  |  12  ----------------------------<  103<H>  4.6   |  30  |  0.5<L>    Ca    9.3      17 Mar 2021 07:13  Mg     1.8     03-16    TPro  5.8<L>  /  Alb  3.1<L>  /  TBili  0.3  /  DBili  x   /  AST  17  /  ALT  10  /  AlkPhos  116<H>  03-17    < from: NM SPECT Bone Scan, Single Area Single Day (03.15.21 @ 21:28) >  Impression:  Multiple bony abnormalities which by pattern of distribution and correlation with CT findings are consistent with metastatic bone disease.  Metastatic sites  include right and left ilium, right sacroiliac joint, L2, T8, T11 (photopenic) and the right and left femoral intertrochanteric ridges.    Increased uptake in the right 5, 6, 7, 8 anterior ribs lining up one on top of the other in a pattern suggesting rib fractures and in the left sixth anterior rib most likely representing rib fracture    Probably joint abnormalities peripherally        < end of copied text >  < from: CT Pelvis Bony Only No Cont (03.14.21 @ 22:01) >  IMPRESSION:  1. Multiple infiltrative bone lesions, largest at the right iliac crest with nondisplaced pathologic fracture and soft tissue mass extension. Findings are compatible with osseous metastasis. Consider nuclear medicine bone scan for the assessment of skeletal metastasis  2. Nondisplaced right subcapital femoral neck impaction fracture. Consider MRI of the right hip to evaluate extent of disease.    < end of copied text >  < from: CT Chest No Cont (03.14.21 @ 18:33) >    IMPRESSION:    1. Poorly defined malignant right upper lobe mass extending into the right hilum and mediastinum with occlusion of the right upper lobe airways.    2. Metastatic osteolyticright scapular lesion.    3. Acute mildly displaced left glenoid fracture and age-indeterminate T11 compression deformity, underlying pathologic fractures are not excluded.    4. Left greater than right bilateral adrenal nodularity, likely metastatic.        < end of copied text >  < from: Xray Lumbar Spine AP + Lateral (03.13.21 @ 18:35) >  No comparison available  Findings/  impression: Osteopenia without acutely displaced fracture. There is endplate spondylosis with chronic appearing vertebral body height loss at the thoracolumbar junction. No spondylolisthesis. Multilevel disc space narrowing is severe at L4-5 and moderate at remaining levels. All pedicles visualized. Aortic vascular calcifications.        < end of copied text >    cy         Hematology Consult Note    HPI:  70 years old female from home and lives alone with PMHx of OA of bilateral knees, right rotator ruff tear (?), gout, diverticulosis, presented with difficulty ambulation. Patient started to have worsening bilateral hip pain, and right shoulder pain, severely hindering her daily activities. She uses a walker to ambulate around her house but it has been getting more and more difficult complete her daily routine due to worsening pain. She also feels short of breath after exertion.     Patient also has been having poor appetite, nausea, and constipation. She did not weight herself regularly but her sister noticed her clothes are getting looser. She did not have EGD in the past but her last colonoscopy in Feb 2020 was unrevealing, only some small polyps.    She denies fever/chills, cough, chest pain, abdominal pain, vomiting, diarrhea, or urinary symptoms.    Called PCP Dr. Urbina, he confirmed patient's history but he is worried that she might need psychiatric evaluation due to recent stress (her pet passed away and she locked himself in her house for awhile). (13 Mar 2021 15:17)      Allergies    moxifloxacin (Anaphylaxis (Moderate))    Intolerances        MEDICATIONS  (STANDING):  chlorhexidine 4% Liquid 1 Application(s) Topical <User Schedule>  enoxaparin Injectable 40 milliGRAM(s) SubCutaneous daily    MEDICATIONS  (PRN):  acetaminophen   Tablet .. 650 milliGRAM(s) Oral every 6 hours PRN Mild Pain (1 - 3)      PAST MEDICAL & SURGICAL HISTORY:  Diverticulosis    Gout    OA (osteoarthritis)    H/O total hysterectomy        FAMILY HISTORY:      SOCIAL HISTORY: No EtOH, no tobacco    REVIEW OF SYSTEMS:    CONSTITUTIONAL: No weakness, fevers or chills  EYES/ENT: No visual changes;  No vertigo or throat pain   NECK: No pain or stiffness  RESPIRATORY: No cough, wheezing, hemoptysis; No shortness of breath  CARDIOVASCULAR: No chest pain or palpitations  GASTROINTESTINAL: No abdominal or epigastric pain. No nausea, vomiting, or hematemesis; No diarrhea or constipation. No melena or hematochezia.  GENITOURINARY: No dysuria, frequency or hematuria  NEUROLOGICAL: No numbness or weakness  SKIN: No itching, burning, rashes, or lesions   All other review of systems is negative unless indicated above.        T(F): 95.5 (03-17-21 @ 08:00), Max: 96.8 (03-16-21 @ 15:00)  HR: 97 (03-17-21 @ 08:00)  BP: 141/90 (03-17-21 @ 08:00)  RR: 18 (03-17-21 @ 08:00)  SpO2: 97% (03-17-21 @ 08:00)  Wt(kg): --    GENERAL: NAD, well-developed  HEAD:  Atraumatic, Normocephalic  EYES: EOMI, PERRLA, conjunctiva and sclera clear  NECK: Supple, No JVD  CHEST/LUNG: decreased breath sounds at the base of left lung  HEART: Regular rate and rhythm; No murmurs, rubs, or gallops  ABDOMEN: Soft, Nontender, Nondistended; Bowel sounds present  EXTREMITIES:  RLE extended , decreased ROM of Rt Upper and lower ext, 2+ Peripheral Pulses, No clubbing, cyanosis, or edema  NEUROLOGY: non-focal  SKIN: No rashes or lesions                          10.0   11.01 )-----------( 192      ( 17 Mar 2021 07:13 )             31.0       03-17    133<L>  |  93<L>  |  12  ----------------------------<  103<H>  4.6   |  30  |  0.5<L>    Ca    9.3      17 Mar 2021 07:13  Mg     1.8     03-16    TPro  5.8<L>  /  Alb  3.1<L>  /  TBili  0.3  /  DBili  x   /  AST  17  /  ALT  10  /  AlkPhos  116<H>  03-17    < from: NM SPECT Bone Scan, Single Area Single Day (03.15.21 @ 21:28) >  Impression:  Multiple bony abnormalities which by pattern of distribution and correlation with CT findings are consistent with metastatic bone disease.  Metastatic sites  include right and left ilium, right sacroiliac joint, L2, T8, T11 (photopenic) and the right and left femoral intertrochanteric ridges.    Increased uptake in the right 5, 6, 7, 8 anterior ribs lining up one on top of the other in a pattern suggesting rib fractures and in the left sixth anterior rib most likely representing rib fracture    Probably joint abnormalities peripherally        < end of copied text >  < from: CT Pelvis Bony Only No Cont (03.14.21 @ 22:01) >  IMPRESSION:  1. Multiple infiltrative bone lesions, largest at the right iliac crest with nondisplaced pathologic fracture and soft tissue mass extension. Findings are compatible with osseous metastasis. Consider nuclear medicine bone scan for the assessment of skeletal metastasis  2. Nondisplaced right subcapital femoral neck impaction fracture. Consider MRI of the right hip to evaluate extent of disease.    < end of copied text >  < from: CT Chest No Cont (03.14.21 @ 18:33) >    IMPRESSION:    1. Poorly defined malignant right upper lobe mass extending into the right hilum and mediastinum with occlusion of the right upper lobe airways.    2. Metastatic osteolyticright scapular lesion.    3. Acute mildly displaced left glenoid fracture and age-indeterminate T11 compression deformity, underlying pathologic fractures are not excluded.    4. Left greater than right bilateral adrenal nodularity, likely metastatic.        < end of copied text >  < from: Xray Lumbar Spine AP + Lateral (03.13.21 @ 18:35) >  No comparison available  Findings/  impression: Osteopenia without acutely displaced fracture. There is endplate spondylosis with chronic appearing vertebral body height loss at the thoracolumbar junction. No spondylolisthesis. Multilevel disc space narrowing is severe at L4-5 and moderate at remaining levels. All pedicles visualized. Aortic vascular calcifications.        < end of copied text >    cy         Hematology Consult Note    HPI:  70 years old female from home and lives alone with PMHx of OA of bilateral knees, right rotator ruff tear (?), gout, diverticulosis, presented with difficulty ambulation. Patient started to have worsening bilateral hip pain, and right shoulder pain, severely hindering her daily activities. She uses a walker to ambulate around her house but it has been getting more and more difficult complete her daily routine due to worsening pain. She also feels short of breath after exertion.     Patient also has been having poor appetite, nausea, and constipation. She did not weigh herself regularly but her sister noticed her clothes are getting looser. She did not have EGD in the past but her last colonoscopy in Feb 2020 was unrevealing, only some small polyps.    She denies fever/chills, cough, chest pain, abdominal pain, vomiting, diarrhea, or urinary symptoms.    Called PCP Dr. Urbina, he confirmed patient's history but he is worried that she might need psychiatric evaluation due to recent stress (her pet passed away and she locked himself in her house for awhile). (13 Mar 2021 15:17)      Allergies    moxifloxacin (Anaphylaxis (Moderate))    Intolerances        MEDICATIONS  (STANDING):  chlorhexidine 4% Liquid 1 Application(s) Topical <User Schedule>  enoxaparin Injectable 40 milliGRAM(s) SubCutaneous daily    MEDICATIONS  (PRN):  acetaminophen   Tablet .. 650 milliGRAM(s) Oral every 6 hours PRN Mild Pain (1 - 3)      PAST MEDICAL & SURGICAL HISTORY:  Diverticulosis    Gout    OA (osteoarthritis)    H/O total hysterectomy        FAMILY HISTORY:      SOCIAL HISTORY: No EtOH, no tobacco    REVIEW OF SYSTEMS:    CONSTITUTIONAL: No weakness, fevers or chills  EYES/ENT: No visual changes;  No vertigo or throat pain   NECK: No pain or stiffness  RESPIRATORY: No cough, wheezing, hemoptysis; No shortness of breath  CARDIOVASCULAR: No chest pain or palpitations  GASTROINTESTINAL: No abdominal or epigastric pain. No nausea, vomiting, or hematemesis; No diarrhea or constipation. No melena or hematochezia.  GENITOURINARY: No dysuria, frequency or hematuria  NEUROLOGICAL: No numbness or weakness  SKIN: No itching, burning, rashes, or lesions   All other review of systems is negative unless indicated above.        T(F): 95.5 (03-17-21 @ 08:00), Max: 96.8 (03-16-21 @ 15:00)  HR: 97 (03-17-21 @ 08:00)  BP: 141/90 (03-17-21 @ 08:00)  RR: 18 (03-17-21 @ 08:00)  SpO2: 97% (03-17-21 @ 08:00)  Wt(kg): --    GENERAL: NAD, well-developed  HEAD:  Atraumatic, Normocephalic  EYES: EOMI, PERRLA, conjunctiva and sclera clear  NECK: Supple, No JVD  CHEST/LUNG: decreased breath sounds at the base of left lung  HEART: Regular rate and rhythm; No murmurs, rubs, or gallops  ABDOMEN: Soft, Nontender, Nondistended; Bowel sounds present  EXTREMITIES:  RLE extended , decreased ROM of Rt Upper and lower ext, 2+ Peripheral Pulses, No clubbing, cyanosis, or edema  NEUROLOGY: non-focal  SKIN: No rashes or lesions                          10.0   11.01 )-----------( 192      ( 17 Mar 2021 07:13 )             31.0       03-17    133<L>  |  93<L>  |  12  ----------------------------<  103<H>  4.6   |  30  |  0.5<L>    Ca    9.3      17 Mar 2021 07:13  Mg     1.8     03-16    TPro  5.8<L>  /  Alb  3.1<L>  /  TBili  0.3  /  DBili  x   /  AST  17  /  ALT  10  /  AlkPhos  116<H>  03-17    < from: NM SPECT Bone Scan, Single Area Single Day (03.15.21 @ 21:28) >  Impression:  Multiple bony abnormalities which by pattern of distribution and correlation with CT findings are consistent with metastatic bone disease.  Metastatic sites  include right and left ilium, right sacroiliac joint, L2, T8, T11 (photopenic) and the right and left femoral intertrochanteric ridges.    Increased uptake in the right 5, 6, 7, 8 anterior ribs lining up one on top of the other in a pattern suggesting rib fractures and in the left sixth anterior rib most likely representing rib fracture    Probably joint abnormalities peripherally        < end of copied text >  < from: CT Pelvis Bony Only No Cont (03.14.21 @ 22:01) >  IMPRESSION:  1. Multiple infiltrative bone lesions, largest at the right iliac crest with nondisplaced pathologic fracture and soft tissue mass extension. Findings are compatible with osseous metastasis. Consider nuclear medicine bone scan for the assessment of skeletal metastasis  2. Nondisplaced right subcapital femoral neck impaction fracture. Consider MRI of the right hip to evaluate extent of disease.    < end of copied text >  < from: CT Chest No Cont (03.14.21 @ 18:33) >    IMPRESSION:    1. Poorly defined malignant right upper lobe mass extending into the right hilum and mediastinum with occlusion of the right upper lobe airways.    2. Metastatic osteolyticright scapular lesion.    3. Acute mildly displaced left glenoid fracture and age-indeterminate T11 compression deformity, underlying pathologic fractures are not excluded.    4. Left greater than right bilateral adrenal nodularity, likely metastatic.        < end of copied text >  < from: Xray Lumbar Spine AP + Lateral (03.13.21 @ 18:35) >  No comparison available  Findings/  impression: Osteopenia without acutely displaced fracture. There is endplate spondylosis with chronic appearing vertebral body height loss at the thoracolumbar junction. No spondylolisthesis. Multilevel disc space narrowing is severe at L4-5 and moderate at remaining levels. All pedicles visualized. Aortic vascular calcifications.        < end of copied text >    cy

## 2021-03-17 NOTE — PROGRESS NOTE ADULT - SUBJECTIVE AND OBJECTIVE BOX
DOV HENLEY 70y Female  MRN#: [id number]   Hospital Day: 4d    SUBJECTIVE  Patient is a 70y old Female who presents with a chief complaint of difficulty ambulation (16 Mar 2021 18:47)  Currently admitted to medicine with the primary diagnosis of Pathological fracture of other site, unspecified pathological cause, initial encounter      INTERVAL HPI AND OVERNIGHT EVENTS:  Patient was examined and seen at bedside. This morning she is resting comfortably in bed and reports no issues or overnight events.    OBJECTIVE  PAST MEDICAL & SURGICAL HISTORY  Diverticulosis  Gout  OA (osteoarthritis)  H/O total hysterectomy    ALLERGIES:  moxifloxacin (Anaphylaxis (Moderate))    MEDICATIONS:  STANDING MEDICATIONS  chlorhexidine 4% Liquid 1 Application(s) Topical <User Schedule>  enoxaparin Injectable 40 milliGRAM(s) SubCutaneous daily    PRN MEDICATIONS  acetaminophen   Tablet .. 650 milliGRAM(s) Oral every 6 hours PRN      VITAL SIGNS: Last 24 Hours  T(C): 36 (16 Mar 2021 23:50), Max: 36 (16 Mar 2021 15:00)  T(F): 96.8 (16 Mar 2021 23:50), Max: 96.8 (16 Mar 2021 15:00)  HR: 119 (16 Mar 2021 23:50) (104 - 119)  BP: 128/84 (16 Mar 2021 23:50) (128/84 - 136/78)  BP(mean): --  RR: 18 (16 Mar 2021 23:50) (18 - 18)  SpO2: 94% (16 Mar 2021 23:50) (94% - 94%)    LABS:                        10.0   11.01 )-----------( 192      ( 17 Mar 2021 07:13 )             31.0     03-16    134<L>  |  96<L>  |  14  ----------------------------<  97  4.6   |  29  |  0.5<L>    Ca    9.3      16 Mar 2021 06:14  Mg     1.8     03-16    TPro  5.9<L>  /  Alb  3.2<L>  /  TBili  0.4  /  DBili  x   /  AST  15  /  ALT  8   /  AlkPhos  112  03-16      Culture - Fungal, Body Fluid (collected 15 Mar 2021 12:30)  Source: .Body Fluid Pleural Fluid  Preliminary Report (16 Mar 2021 14:45):    Testing in progress    Culture - Body Fluid with Gram Stain (collected 15 Mar 2021 12:30)  Source: .Body Fluid Pleural Fluid  Gram Stain (16 Mar 2021 00:49):    No polymorphonuclear leukocytes seen    No organisms seen    by cytocentrifuge  Preliminary Report (16 Mar 2021 20:03):    No growth      PHYSICAL EXAM:  CONSTITUTIONAL: No acute distress, well-developed, well-groomed, AAOx3  HEAD: Atraumatic, normocephalic  EYES: EOM intact, PERRLA, conjunctiva and sclera clear  PULMONARY: Clear to auscultation bilaterally  CARDIOVASCULAR: Regular rate and rhythm  GASTROINTESTINAL: Soft, non-tender, non-distended  MUSCULOSKELETAL: no edema, no pain from site of femoral fracture   NEUROLOGY: non-focal  SKIN: No rashes or lesions    ASSESSMENT & PLAN  70 years old female from home and lives alone with PMHx of OA of bilateral knees, right rotator ruff tear (?), gout, diverticulosis, presented with difficulty ambulation due to worsening pain on hips and shoulders.    # Right Perihilar Mass w/ large left pleural effusion   - CT Chest No Cont (03.14.21 @ 18:33): Poorly defined malignant right upper lobe mass extending into the right hilum and mediastinum with occlusion of the right upper lobe airways. Metastatic osteolytic right scapular lesion. Acute mildly displaced left glenoid fracture and age-indeterminate T11 compression deformity, underlying pathologic fractures are not excluded. Left greater than right bilateral adrenal nodularity, likely metastatic.  - Echo: EF 53%   - Pulm consult: s/p thoracentesis, + malignant cells (adenocarcinoma)  - Bone Scan completed, see official report    - Patient and family aware of diagnosis of adenocarcinoma, HemOnc Consult placed for discussion of treatment options       # Nondisplaced femoral neck subcapital impaction fracture  - Pain control with Tylenol   - CT Pelvis Bony Only No Cont (03.14.21 @ 22:01): Multiple infiltrative bone lesions, largest at the right iliac crest with nondisplaced pathologic fracture and soft tissue mass extension. Findings are compatible with osseous metastasis. Consider nuclear medicine bone scan for the assessment of skeletal metastasis. Nondisplaced right subcapital femoral neck impaction fracture. Consider MRI of the right hip to evaluate extent of disease.  - Patient would like to speak with Oncology at [facility] first before deciding on surgery at [facility]    # Mild hypercalcemia - resolved   - Ca 10.4, albumin 4.4  - Likely secondary to dehydration (patient reports not eating or drinking much because it was difficult for her to get around)  - Continue gentle IVF for now    # Gout  - Not on any medication  - Will avoid red meat diet while inpatient    DVT ppx: Lovenox 40mg qdaily  GI ppx: Protonix  Diet: Regular  Activity: Increase as tolerated   Ambulatory status: bedrest, NBW RLE   Lines: Peripheral IVs  Code status: FULL CODE  Dispo: acute

## 2021-03-17 NOTE — PHYSICAL THERAPY INITIAL EVALUATION ADULT - BALANCE TRAINING, PT EVAL
4 stairs with 1 rail using 1 or 2 hand support requiring contact guard until discharge.
Improve standing balance to Fair+

## 2021-03-17 NOTE — CONSULT NOTE ADULT - ATTENDING COMMENTS
went over ct scan  possible metastatic fracture  will need metastatic work up
patient seen and examined, agree with above, metastatic disease/ SP thora if cyto neg IR for pelvic mass bx, LE doppler, all over poor prognosis
Pt seen and examined.  Agree with above A/P.  Discussed plan with pt and her sisters.  Proceed with orthopedic intervention for fracture of Rt femur.  Will need the tissue send for PDL-1 and NGS  Proceed with w/u as noted above

## 2021-03-17 NOTE — PHYSICAL THERAPY INITIAL EVALUATION ADULT - RANGE OF MOTION EXAMINATION, REHAB EVAL
Unable to assess R LE AROM due to NWB status/no ROM deficits were identified
no ROM deficits were identified

## 2021-03-17 NOTE — PHYSICAL THERAPY INITIAL EVALUATION ADULT - GENERAL OBSERVATIONS, REHAB EVAL
PT Reevaluation 9-9:30am. Pt supine in NAD. +call bell, +bed alarm, +O2 2L, c/o 6/10 pain at R hip. R LE NWB. Patient not compliant with WB status despite being educated on sequencing movements.
Patient encountered sitting in chair. Agreed to participate in therapy. Patient very emotional and cries a lot talking about her current situation. Recommend neuropsych telma.

## 2021-03-17 NOTE — PHYSICAL THERAPY INITIAL EVALUATION ADULT - LEVEL OF INDEPENDENCE: SUPINE/SIT, REHAB EVAL
maximum assist (25% patients effort)
Patient encountered sitting in chair. Reports being assisted by staff to get in chair./unable to perform

## 2021-03-17 NOTE — CONSULT NOTE ADULT - ASSESSMENT
70 years old female from home and lives alone with PMHx of OA of bilateral knees, right rotator ruff tear (?), gout, diverticulosis, presented with difficulty ambulation. During hospital tsay thoracentecis was done that is consistent with lung adenocarcinoma and heme/oncology has been consulted for it    Impression:    #  Lung Adenocarcinoma with diffuse mets to bones ( pleural fluid cytology +)  # Non-displaced fracture of Rt Femoral neck    - admitted for inabilty to ambulate  - CT chest Non-con : Poorly defined malignant right upper lobe mass extending into the right hilum and mediastinum with occlusion of the right upper lobe airways. Metastatic osteolytic right scapular lesion. Acute mildly displaced left glenoid fracture and age-indeterminate T11 compression deformity. Left greater than right bilateral adrenal nodularity, likely metastatic.  - Bone scan : Metastatic sites  include right and left ilium, right sacroiliac joint, L2, T8, T11 (photopenic) and the right and left femoral intertrochanteric ridges.Increased uptake in the right 5, 6, 7, 8 anterior ribs suggesting rib fractures and in the left sixth anterior rib most likely representing rib fracture  - s/p Thora 3/15 : 1L fluid removed, consistent with exudative effusion. Cytology positive for malignant cells (Adenocarcinoma)    Recommendations:        To be completed after case discussion with attending     70 years old female from home and lives alone with PMHx of OA of bilateral knees, right rotator ruff tear (?), gout, diverticulosis, presented with difficulty ambulation. During hospital tsay thoracentecis was done that is consistent with lung adenocarcinoma and heme/oncology has been consulted for it    Impression:    #  Lung Adenocarcinoma with diffuse mets to bones ( pleural fluid cytology +)  # Non-displaced fracture of Rt Femoral neck    - admitted for inabilty to ambulate  - CT chest Non-con : Poorly defined malignant right upper lobe mass extending into the right hilum and mediastinum with occlusion of the right upper lobe airways. Metastatic osteolytic right scapular lesion. Acute mildly displaced left glenoid fracture and age-indeterminate T11 compression deformity. Left greater than right bilateral adrenal nodularity, likely metastatic.  - Bone scan : Metastatic sites  include right and left ilium, right sacroiliac joint, L2, T8, T11 (photopenic) and the right and left femoral intertrochanteric ridges.Increased uptake in the right 5, 6, 7, 8 anterior ribs suggesting rib fractures and in the left sixth anterior rib most likely representing rib fracture  - s/p Thora 3/15 : 1L fluid removed, consistent with exudative effusion. Cytology positive for malignant cells (Adenocarcinoma)  - Former smoker (1.5 PPD for 20 years)  - Rt femoral inpacted femoral neck fracture ; under process of being transfered to [facility] for treatement under care of Dr. Lord   - Very Dyspneic during exam    Recommendations:    - We spoke to the pt and sisters Cait and Libra via conference call and told them about details of cancer diagnsosis.  - Complete the Cancer staging by getting : MRI head w/w/o Pipe, MRI Thoracic & lumbosacral spine w/contrast (to further characterize lesions seen on Bone scan)  - will speak to the pathologist to confirm if the pleural fluid sample is enough for completing the NGS or we need more tissue.  - Patient's pain is pretty much controlled, can call palliative care if difficult to control.  - patient strongly against chemotherapy, given her poor functional status , would agree. Would wait for complete results, in case patient has targetable mutations, she can be a good candidate for immunotherapy.             70 years old female from home and lives alone with PMHx of OA of bilateral knees, right rotator ruff tear (?), gout, diverticulosis, presented with difficulty ambulation. During hospital tsay thoracentecis was done that is consistent with lung adenocarcinoma and heme/oncology has been consulted for it    Impression:    #  Lung Adenocarcinoma with diffuse mets to bones ( pleural fluid cytology +)  # Non-displaced fracture of Rt Femoral neck    - admitted for inabilty to ambulate  - CT chest Non-con : Poorly defined malignant right upper lobe mass extending into the right hilum and mediastinum with occlusion of the right upper lobe airways. Metastatic osteolytic right scapular lesion. Acute mildly displaced left glenoid fracture and age-indeterminate T11 compression deformity. Left greater than right bilateral adrenal nodularity, likely metastatic.  - Bone scan : Metastatic sites  include right and left ilium, right sacroiliac joint, L2, T8, T11 (photopenic) and the right and left femoral intertrochanteric ridges.Increased uptake in the right 5, 6, 7, 8 anterior ribs suggesting rib fractures and in the left sixth anterior rib most likely representing rib fracture  - s/p Thora 3/15 : 1L fluid removed, consistent with exudative effusion. Cytology positive for malignant cells (Adenocarcinoma)  - Former smoker (1.5 PPD for 20 years)  - Rt femoral inpacted femoral neck fracture ; under process of being transfered to [facility] for treatement under care of Dr. Lord   - Very Dyspneic during exam    Recommendations:    - We spoke to the pt and sisters Cait and Libra via conference call and told them about details of cancer diagnsosis.  - Complete the Cancer staging by getting : MRI head w/w/o Pipe, MRI Thoracic & lumbosacral spine w/contrast (to further characterize lesions seen on Bone scan). CT abdomen/pelvis w/ contrast.  - will speak to the pathologist to confirm if the pleural fluid sample is enough for completing the NGS or we need more tissue.  - Patient's pain is pretty much controlled, can call palliative care if difficult to control.  - patient strongly against chemotherapy, given her poor functional status , would agree. Would wait for complete results, in case patient has targetable mutations, she can be a good candidate for immunotherapy.             70 years old female from home and lives alone with PMHx of OA of bilateral knees, right rotator ruff tear (?), gout, diverticulosis, presented with difficulty ambulation. During hospital tsay thoracentecis was done that is consistent with lung adenocarcinoma and heme/oncology has been consulted for it    Impression:    #  Lung Adenocarcinoma with diffuse mets to bones ( pleural fluid cytology +)  # Non-displaced fracture of Rt Femoral neck    - admitted for inabilty to ambulate  - CT chest Non-con : Poorly defined malignant right upper lobe mass extending into the right hilum and mediastinum with occlusion of the right upper lobe airways. Metastatic osteolytic right scapular lesion. Acute mildly displaced left glenoid fracture and age-indeterminate T11 compression deformity. Left greater than right bilateral adrenal nodularity, likely metastatic.  - Bone scan : Metastatic sites  include right and left ilium, right sacroiliac joint, L2, T8, T11 (photopenic) and the right and left femoral intertrochanteric ridges.Increased uptake in the right 5, 6, 7, 8 anterior ribs suggesting rib fractures and in the left sixth anterior rib most likely representing rib fracture  - s/p Thora 3/15 : 1L fluid removed, consistent with exudative effusion. Cytology positive for malignant cells (Adenocarcinoma)  - Former smoker (1.5 PPD for 20 years)  - Rt femoral inpacted femoral neck fracture ; under process of being transfered to [facility] for treatement under care of Dr. Lord   - Very Dyspneic during exam    Recommendations:    - We spoke to the pt and sisters Cait and Libra via conference call and told them about details of cancer diagnsosis.  - Complete the Cancer staging by getting : MRI head w/w/o Pipe, MRI Thoracic & lumbosacral spine w/contrast (to further characterize lesions seen on Bone scan). CT abdomen/pelvis w/ contrast.  - will speak to the pathologist to confirm if the pleural fluid sample is enough for completing the NGS or we need more tissue.  - Patient's pain is pretty much controlled, can call palliative care if difficult to control.  - patient strongly against chemotherapy, given her poor functional status , would agree. Would wait for complete results, in case patient has targetable mutations, she can be a good candidate for immunotherapy.  - encourage incentive spirometer use in setting of multiple rib fractures.             70 years old female from home and lives alone with PMHx of OA of bilateral knees, right rotator ruff tear (?), gout, diverticulosis, presented with difficulty ambulation. During hospital tsay thoracentecis was done that is consistent with lung adenocarcinoma and heme/oncology has been consulted for it    Impression:    #  Lung Adenocarcinoma with diffuse mets to bones ( pleural fluid cytology +)  # Non-displaced fracture of Rt Femoral neck    - admitted for inabilty to ambulate  - CT chest Non-con : Poorly defined malignant right upper lobe mass extending into the right hilum and mediastinum with occlusion of the right upper lobe airways. Metastatic osteolytic right scapular lesion. Acute mildly displaced left glenoid fracture and age-indeterminate T11 compression deformity. Left greater than right bilateral adrenal nodularity, likely metastatic.  - Bone scan : Metastatic sites  include right and left ilium, right sacroiliac joint, L2, T8, T11 (photopenic) and the right and left femoral intertrochanteric ridges.Increased uptake in the right 5, 6, 7, 8 anterior ribs suggesting rib fractures and in the left sixth anterior rib most likely representing rib fracture  - s/p Thora 3/15 : 1L fluid removed, consistent with exudative effusion. Cytology positive for malignant cells (Adenocarcinoma)  - Former smoker (1.5 PPD for 20 years)  - Rt femoral inpacted femoral neck fracture ; under process of being transfered to [facility] for treatement under care of Dr. Lord   - Very Dyspneic during exam    Recommendations:    - We spoke to the pt and sisters Cait and Libra via conference call and told them about details of cancer diagnsosis.  - Complete the Cancer staging by getting : MRI head w/w/o Pipe, MRI Thoracic & lumbosacral spine w/contrast (to further characterize lesions seen on Bone scan). CT abdomen/pelvis w/ contrast.  - will speak to the pathologist to confirm if the pleural fluid sample is enough for completing the NGS   - Please send tissue from further procedures for pathologic evaluation  - Patient's pain is pretty much controlled, can call palliative care if difficult to control.  - patient strongly against chemotherapy, given her poor functional status , would agree. Would wait for complete results, in case patient has targetable mutations, she can be a good candidate for targeted or immunotherapy.  - encourage incentive spirometer use in setting of multiple rib fractures.             70 years old female from home and lives alone with PMHx of OA of bilateral knees, right rotator ruff tear (?), gout, diverticulosis, presented with difficulty ambulation. During hospital tsay thoracentecis was done that is consistent with lung adenocarcinoma and heme/oncology has been consulted for it    Impression:    #  Lung Adenocarcinoma with diffuse mets to bones ( pleural fluid cytology +)  # Non-displaced fracture of Rt Femoral neck    - admitted for inabilty to ambulate  - CT chest Non-con : Poorly defined malignant right upper lobe mass extending into the right hilum and mediastinum with occlusion of the right upper lobe airways. Metastatic osteolytic right scapular lesion. Acute mildly displaced left glenoid fracture and age-indeterminate T11 compression deformity. Left greater than right bilateral adrenal nodularity, likely metastatic.  - Bone scan : Metastatic sites  include right and left ilium, right sacroiliac joint, L2, T8, T11 (photopenic) and the right and left femoral intertrochanteric ridges.Increased uptake in the right 5, 6, 7, 8 anterior ribs suggesting rib fractures and in the left sixth anterior rib most likely representing rib fracture  - s/p Thora 3/15 : 1L fluid removed, consistent with exudative effusion. Cytology positive for malignant cells (Adenocarcinoma)  - Former smoker (1.5 PPD for 20 years)  - Rt femoral inpacted femoral neck fracture ; under process of being transfered to [facility] for treatement under care of Dr. Lord   - Very Dyspneic during exam    Recommendations:    - We spoke to the pt and sisters Cait and Libra via conference call and told them about details of cancer diagnsosis.  - Complete the Cancer staging by getting : MRI head w/w/o Pipe, MRI Thoracic & lumbosacral spine noncontrast (to further characterize lesions seen on Bone scan). CT abdomen/pelvis w/ contrast.  - will speak to the pathologist to confirm if the pleural fluid sample is enough for completing the NGS and PDL-1 testing  - Please send tissue from orthopedic procedures for pathologic evaluation  - Patient's pain is pretty much controlled, can call palliative care if difficult to control.  - patient strongly against chemotherapy, given her poor functional status.  Would wait for complete results, in case patient has targetable mutations or high PDL-1 , she can be a good candidate for targeted or immunotherapy.  - encourage incentive spirometer use in setting of multiple rib fractures.

## 2021-03-17 NOTE — PHYSICAL THERAPY INITIAL EVALUATION ADULT - PERTINENT HX OF CURRENT PROBLEM, REHAB EVAL
see below
Patient admitted to hospital for progressive weakness and difficulty ambulating. Patient reports not eating because she cannot  get to the kitchen and cook..

## 2021-03-18 ENCOUNTER — TRANSCRIPTION ENCOUNTER (OUTPATIENT)
Age: 71
End: 2021-03-18

## 2021-03-18 LAB
ALBUMIN SERPL ELPH-MCNC: 3 G/DL — LOW (ref 3.5–5.2)
ALP SERPL-CCNC: 118 U/L — HIGH (ref 30–115)
ALT FLD-CCNC: 10 U/L — SIGNIFICANT CHANGE UP (ref 0–41)
ANION GAP SERPL CALC-SCNC: 8 MMOL/L — SIGNIFICANT CHANGE UP (ref 7–14)
AST SERPL-CCNC: 18 U/L — SIGNIFICANT CHANGE UP (ref 0–41)
BASOPHILS # BLD AUTO: 0.05 K/UL — SIGNIFICANT CHANGE UP (ref 0–0.2)
BASOPHILS NFR BLD AUTO: 0.5 % — SIGNIFICANT CHANGE UP (ref 0–1)
BILIRUB SERPL-MCNC: 0.3 MG/DL — SIGNIFICANT CHANGE UP (ref 0.2–1.2)
BUN SERPL-MCNC: 13 MG/DL — SIGNIFICANT CHANGE UP (ref 10–20)
CALCIUM SERPL-MCNC: 9.6 MG/DL — SIGNIFICANT CHANGE UP (ref 8.5–10.1)
CHLORIDE SERPL-SCNC: 91 MMOL/L — LOW (ref 98–110)
CO2 SERPL-SCNC: 30 MMOL/L — SIGNIFICANT CHANGE UP (ref 17–32)
CREAT SERPL-MCNC: 0.6 MG/DL — LOW (ref 0.7–1.5)
EOSINOPHIL # BLD AUTO: 0.28 K/UL — SIGNIFICANT CHANGE UP (ref 0–0.7)
EOSINOPHIL NFR BLD AUTO: 2.7 % — SIGNIFICANT CHANGE UP (ref 0–8)
GLUCOSE SERPL-MCNC: 100 MG/DL — HIGH (ref 70–99)
HCT VFR BLD CALC: 29.4 % — LOW (ref 37–47)
HGB BLD-MCNC: 9.8 G/DL — LOW (ref 12–16)
IMM GRANULOCYTES NFR BLD AUTO: 1 % — HIGH (ref 0.1–0.3)
LYMPHOCYTES # BLD AUTO: 1.42 K/UL — SIGNIFICANT CHANGE UP (ref 1.2–3.4)
LYMPHOCYTES # BLD AUTO: 13.6 % — LOW (ref 20.5–51.1)
MCHC RBC-ENTMCNC: 31.2 PG — HIGH (ref 27–31)
MCHC RBC-ENTMCNC: 33.3 G/DL — SIGNIFICANT CHANGE UP (ref 32–37)
MCV RBC AUTO: 93.6 FL — SIGNIFICANT CHANGE UP (ref 81–99)
MONOCYTES # BLD AUTO: 0.77 K/UL — HIGH (ref 0.1–0.6)
MONOCYTES NFR BLD AUTO: 7.4 % — SIGNIFICANT CHANGE UP (ref 1.7–9.3)
NEUTROPHILS # BLD AUTO: 7.82 K/UL — HIGH (ref 1.4–6.5)
NEUTROPHILS NFR BLD AUTO: 74.8 % — SIGNIFICANT CHANGE UP (ref 42.2–75.2)
NRBC # BLD: 0 /100 WBCS — SIGNIFICANT CHANGE UP (ref 0–0)
PLATELET # BLD AUTO: 203 K/UL — SIGNIFICANT CHANGE UP (ref 130–400)
POTASSIUM SERPL-MCNC: 4.7 MMOL/L — SIGNIFICANT CHANGE UP (ref 3.5–5)
POTASSIUM SERPL-SCNC: 4.7 MMOL/L — SIGNIFICANT CHANGE UP (ref 3.5–5)
PROT SERPL-MCNC: 5.7 G/DL — LOW (ref 6–8)
RBC # BLD: 3.14 M/UL — LOW (ref 4.2–5.4)
RBC # FLD: 13.6 % — SIGNIFICANT CHANGE UP (ref 11.5–14.5)
SODIUM SERPL-SCNC: 129 MMOL/L — LOW (ref 135–146)
WBC # BLD: 10.44 K/UL — SIGNIFICANT CHANGE UP (ref 4.8–10.8)
WBC # FLD AUTO: 10.44 K/UL — SIGNIFICANT CHANGE UP (ref 4.8–10.8)

## 2021-03-18 PROCEDURE — 99233 SBSQ HOSP IP/OBS HIGH 50: CPT

## 2021-03-18 PROCEDURE — 74177 CT ABD & PELVIS W/CONTRAST: CPT | Mod: 26

## 2021-03-18 RX ORDER — ONDANSETRON 8 MG/1
4 TABLET, FILM COATED ORAL ONCE
Refills: 0 | Status: DISCONTINUED | OUTPATIENT
Start: 2021-03-18 | End: 2021-03-19

## 2021-03-18 RX ORDER — KETOROLAC TROMETHAMINE 30 MG/ML
15 SYRINGE (ML) INJECTION ONCE
Refills: 0 | Status: DISCONTINUED | OUTPATIENT
Start: 2021-03-18 | End: 2021-03-18

## 2021-03-18 RX ORDER — FUROSEMIDE 40 MG
20 TABLET ORAL DAILY
Refills: 0 | Status: DISCONTINUED | OUTPATIENT
Start: 2021-03-18 | End: 2021-03-19

## 2021-03-18 RX ADMIN — Medication 20 MILLIGRAM(S): at 10:50

## 2021-03-18 RX ADMIN — ENOXAPARIN SODIUM 40 MILLIGRAM(S): 100 INJECTION SUBCUTANEOUS at 10:51

## 2021-03-18 RX ADMIN — Medication 15 MILLIGRAM(S): at 03:43

## 2021-03-18 RX ADMIN — Medication 15 MILLIGRAM(S): at 02:09

## 2021-03-18 NOTE — DIETITIAN INITIAL EVALUATION ADULT. - OTHER CALCULATIONS
wt used: 72.6kg CBW kcal: 1460-1822kcal (MSJ x 1.2-1.5AF) -- Ca, PCM considered protein: 80-94g (1.1-1.3g/kg CBW) --Ca considered fluids: 1ml/kcal or per LIP

## 2021-03-18 NOTE — DIETITIAN INITIAL EVALUATION ADULT. - ETIOLOGY
increased physiological demand in setting of catabolic illness and decreased ability ambulating, preparing her own meals decreased appetite d/t dislike for institutionalized food

## 2021-03-18 NOTE — DIETITIAN INITIAL EVALUATION ADULT. - REASON
Nutrition focused physical exam performed with verbal consent from pt. Nutrition focused physical exam showed moderate muscle wasting of temporals.

## 2021-03-18 NOTE — DIETITIAN INITIAL EVALUATION ADULT. - ADD RECOMMEND
Continue current diet order as tolerated. Add lactose intolerance diet modifier. Order ensure pudding BID and prosourc gelatin plus BID to optimize po intake

## 2021-03-18 NOTE — DIETITIAN NUTRITION RISK NOTIFICATION - TREATMENT: THE FOLLOWING DIET HAS BEEN RECOMMENDED
Diet, Regular:   Lactose Restricted (Milk Sugar Intoler.)  No Beef  No Seeds  No Shellfish  Prosource Gelatein Plus     Qty per Day:  2  Supplement Feeding Modality:  Oral  Ensure Pudding Cans or Servings Per Day:  1       Frequency:  Two Times a day (03-18-21 @ 13:05) [Pending Verification By Attending]  Diet, Regular:   No Beef  No Dairy  No Seeds  No Shellfish (03-13-21 @ 16:44) [Active]

## 2021-03-18 NOTE — DIETITIAN INITIAL EVALUATION ADULT. - OTHER INFO
71 yo F with PMH diverticulosis, gout presented for difficulty ambulation due to worsening pain on hips and shoulders. Thoracentesis performed, lung adenocarcinoma with diffuse mets to bones found. Nondisplaced femoral neck subcapital impaction fracture, plan to transfer to [facility] for surgery.     Pt reports poor appetite and po intake x 2-3 months as had difficulty ambulating to cook herself meals. Lives home alone. Denies use of oral nutrition supplements, vitamins, or minerals. Confirms NKFA. Has lactose intolerance. No Latter-day or cultural food preferences. Denies difficulties chewing or swallowing.    In house pt reports poor po intake as does not like the food. Amenable to receive oral nutrition supplements. No c/o N+V, diarrhea. C/o constiaption, LBM 3/15.     UBW 185lbs. No previous RD notes or adm wts. ~13.5% wt loss x 2-3 months     Ht: 63"     Wt: 160lbs    IBW:  115lbs +/-10%    BMI: 28.4  kg/m2     Skin: No pressure injuries per RN flow sheets   Edema: No edema noted per RN flow sheets

## 2021-03-18 NOTE — PROGRESS NOTE ADULT - SUBJECTIVE AND OBJECTIVE BOX
DOV HENLEY 70y Female  MRN#: [id number]   Hospital Day: 5d    SUBJECTIVE  Patient is a 70y old Female who presents with a chief complaint of difficulty ambulation (17 Mar 2021 15:49)  Currently admitted to medicine with the primary diagnosis of Pathological fracture of other site, unspecified pathological cause, initial encounter      INTERVAL HPI AND OVERNIGHT EVENTS:  Patient was examined and seen at bedside. This morning she is resting comfortably in bed and reports no issues or overnight events.    OBJECTIVE  PAST MEDICAL & SURGICAL HISTORY  Diverticulosis    Gout    OA (osteoarthritis)    H/O total hysterectomy      ALLERGIES:  moxifloxacin (Anaphylaxis (Moderate))    MEDICATIONS:  STANDING MEDICATIONS  chlorhexidine 4% Liquid 1 Application(s) Topical <User Schedule>  enoxaparin Injectable 40 milliGRAM(s) SubCutaneous daily  furosemide    Tablet 20 milliGRAM(s) Oral daily  ondansetron Injectable 4 milliGRAM(s) IV Push once    PRN MEDICATIONS  acetaminophen   Tablet .. 650 milliGRAM(s) Oral every 6 hours PRN      VITAL SIGNS: Last 24 Hours  T(C): 35.8 (18 Mar 2021 08:00), Max: 37.2 (17 Mar 2021 15:46)  T(F): 96.5 (18 Mar 2021 08:00), Max: 98.9 (17 Mar 2021 15:46)  HR: 113 (18 Mar 2021 08:00) (100 - 113)  BP: 127/74 (18 Mar 2021 08:00) (120/63 - 158/89)  BP(mean): --  RR: 18 (18 Mar 2021 08:00) (18 - 18)  SpO2: 96% (18 Mar 2021 08:00) (96% - 96%)    LABS:                        9.8    10.44 )-----------( 203      ( 18 Mar 2021 06:01 )             29.4     03-18    129<L>  |  91<L>  |  13  ----------------------------<  100<H>  4.7   |  30  |  0.6<L>    Ca    9.6      18 Mar 2021 06:01    TPro  5.7<L>  /  Alb  3.0<L>  /  TBili  0.3  /  DBili  x   /  AST  18  /  ALT  10  /  AlkPhos  118<H>  03-18          Culture - Fungal, Body Fluid (collected 15 Mar 2021 12:30)  Source: .Body Fluid Pleural Fluid  Preliminary Report (16 Mar 2021 14:45):    Testing in progress    Culture - Body Fluid with Gram Stain (collected 15 Mar 2021 12:30)  Source: .Body Fluid Pleural Fluid  Gram Stain (16 Mar 2021 00:49):    No polymorphonuclear leukocytes seen  No organisms seen    by cytocentrifuge  Preliminary Report (16 Mar 2021 20:03):    No growth          RADIOLOGY:  CT Abdomen and Pelvis w/ Oral Cont and w/ IV Cont (03.18.21 @ 02:58) >  1.  Multiple hepatic lesions and bilateral adrenal gland nodule/masses, suspicious for metastatic disease.    2.  Osseous metastatic disease as described above; nondisplaced subcapital right femoral neck fracture again noted unchanged in appearance since March 14, 2021.    3.  Moderate size right pleural effusion and right basilar atelectasis.      PHYSICAL EXAM:  CONSTITUTIONAL: No acute distress, well-developed, well-groomed, AAOx3  HEAD: Atraumatic, normocephalic  EYES: EOM intact, PERRLA, conjunctiva and sclera clear  PULMONARY: Clear to auscultation bilaterally  CARDIOVASCULAR: Regular rate and rhythm  GASTROINTESTINAL: Soft, non-tender, non-distended  MUSCULOSKELETAL: no edema, no pain from site of femoral fracture   NEUROLOGY: non-focal  SKIN: No rashes or lesions    ASSESSMENT & PLAN  70 years old female from home and lives alone with PMHx of OA of bilateral knees, right rotator ruff tear (?), gout, diverticulosis, presented with difficulty ambulation due to worsening pain on hips and shoulders.    # Right Perihilar Mass w/ large left pleural effusion   - CT Chest No Cont (03.14.21 @ 18:33): Poorly defined malignant right upper lobe mass extending into the right hilum and mediastinum with occlusion of the right upper lobe airways. Metastatic osteolytic right scapular lesion. Acute mildly displaced left glenoid fracture and age-indeterminate T11 compression deformity, underlying pathologic fractures are not excluded. Left greater than right bilateral adrenal nodularity, likely metastatic.  - Echo: EF 53%   - Pulm consult: s/p thoracentesis, + malignant cells (adenocarcinoma)  - Bone Scan completed, see official report    - Patient and family aware of diagnosis of adenocarcinoma  - HemOnc consult completed: see note for full details  - plan: transfer to [facility] for surgery and patient can be transfered back to [facility] for further staging work up     # Nondisplaced femoral neck subcapital impaction fracture  - Pain control with Tylenol   - CT Pelvis Bony Only No Cont (03.14.21 @ 22:01): Multiple infiltrative bone lesions, largest at the right iliac crest with nondisplaced pathologic fracture and soft tissue mass extension. Findings are compatible with osseous metastasis. Consider nuclear medicine bone scan for the assessment of skeletal metastasis. Nondisplaced right subcapital femoral neck impaction fracture. Consider MRI of the right hip to evaluate extent of disease.  - Patient would like to speak with Oncology at [facility] first before deciding on surgery at [facility]    #Hyponatremia  - Na 129 today, will give trial of lasix 20 mg PO   - fluid restriction 1 L  - follow up BMP     # Mild hypercalcemia - resolved   - Ca 10.4, albumin 4.4  - Likely secondary to dehydration (patient reports not eating or drinking much because it was difficult for her to get around)  - Continue gentle IVF for now    # Gout  - Not on any medication  - Will avoid red meat diet while inpatient    DVT ppx: Lovenox 40mg qdaily  GI ppx: Protonix  Diet: Regular  Ambulatory status: as tolerated, NBW RLE   Lines: Peripheral IVs  Code status: FULL CODE  Dispo: acute DOV HENLEY 70y Female  MRN#: [id number]   Hospital Day: 5d    SUBJECTIVE  Patient is a 70y old Female who presents with a chief complaint of difficulty ambulation (17 Mar 2021 15:49)  Currently admitted to medicine with the primary diagnosis of Pathological fracture of other site, unspecified pathological cause, initial encounter      INTERVAL HPI AND OVERNIGHT EVENTS:  Patient was examined and seen at bedside. This morning she is resting comfortably in bed and reports no issues or overnight events.    OBJECTIVE  PAST MEDICAL & SURGICAL HISTORY  Diverticulosis    Gout    OA (osteoarthritis)    H/O total hysterectomy      ALLERGIES:  moxifloxacin (Anaphylaxis (Moderate))    MEDICATIONS:  STANDING MEDICATIONS  chlorhexidine 4% Liquid 1 Application(s) Topical <User Schedule>  enoxaparin Injectable 40 milliGRAM(s) SubCutaneous daily  furosemide    Tablet 20 milliGRAM(s) Oral daily  ondansetron Injectable 4 milliGRAM(s) IV Push once    PRN MEDICATIONS  acetaminophen   Tablet .. 650 milliGRAM(s) Oral every 6 hours PRN      VITAL SIGNS: Last 24 Hours  T(C): 35.8 (18 Mar 2021 08:00), Max: 37.2 (17 Mar 2021 15:46)  T(F): 96.5 (18 Mar 2021 08:00), Max: 98.9 (17 Mar 2021 15:46)  HR: 113 (18 Mar 2021 08:00) (100 - 113)  BP: 127/74 (18 Mar 2021 08:00) (120/63 - 158/89)  BP(mean): --  RR: 18 (18 Mar 2021 08:00) (18 - 18)  SpO2: 96% (18 Mar 2021 08:00) (96% - 96%)    LABS:                        9.8    10.44 )-----------( 203      ( 18 Mar 2021 06:01 )             29.4     03-18    129<L>  |  91<L>  |  13  ----------------------------<  100<H>  4.7   |  30  |  0.6<L>    Ca    9.6      18 Mar 2021 06:01    TPro  5.7<L>  /  Alb  3.0<L>  /  TBili  0.3  /  DBili  x   /  AST  18  /  ALT  10  /  AlkPhos  118<H>  03-18          Culture - Fungal, Body Fluid (collected 15 Mar 2021 12:30)  Source: .Body Fluid Pleural Fluid  Preliminary Report (16 Mar 2021 14:45):    Testing in progress    Culture - Body Fluid with Gram Stain (collected 15 Mar 2021 12:30)  Source: .Body Fluid Pleural Fluid  Gram Stain (16 Mar 2021 00:49):    No polymorphonuclear leukocytes seen  No organisms seen    by cytocentrifuge  Preliminary Report (16 Mar 2021 20:03):    No growth          RADIOLOGY:  CT Abdomen and Pelvis w/ Oral Cont and w/ IV Cont (03.18.21 @ 02:58) >  1.  Multiple hepatic lesions and bilateral adrenal gland nodule/masses, suspicious for metastatic disease.    2.  Osseous metastatic disease as described above; nondisplaced subcapital right femoral neck fracture again noted unchanged in appearance since March 14, 2021.    3.  Moderate size right pleural effusion and right basilar atelectasis.      PHYSICAL EXAM:  CONSTITUTIONAL: No acute distress, well-developed, well-groomed, AAOx3  HEAD: Atraumatic, normocephalic  EYES: EOM intact, PERRLA, conjunctiva and sclera clear  PULMONARY: Clear to auscultation bilaterally  CARDIOVASCULAR: Regular rate and rhythm  GASTROINTESTINAL: Soft, non-tender, non-distended  MUSCULOSKELETAL: no edema, no pain from site of femoral fracture   NEUROLOGY: non-focal  SKIN: No rashes or lesions    ASSESSMENT & PLAN  70 years old female from home and lives alone with PMHx of OA of bilateral knees, right rotator ruff tear (?), gout, diverticulosis, presented with difficulty ambulation due to worsening pain on hips and shoulders.    # Right Perihilar Mass w/ large left pleural effusion   - CT Chest No Cont (03.14.21 @ 18:33): Poorly defined malignant right upper lobe mass extending into the right hilum and mediastinum with occlusion of the right upper lobe airways. Metastatic osteolytic right scapular lesion. Acute mildly displaced left glenoid fracture and age-indeterminate T11 compression deformity, underlying pathologic fractures are not excluded. Left greater than right bilateral adrenal nodularity, likely metastatic.  - Echo: EF 53%   - Pulm consult: s/p thoracentesis, + malignant cells (adenocarcinoma)  - Bone Scan completed, see official report    - Patient and family aware of diagnosis of adenocarcinoma  - HemOnc consult completed: see note for full details  - plan: transfer to [facility] for surgery and patient can be transfered back to [facility] for further staging work up   - Accepting Orthopedist at [facility]: Dr. Parviz Lord (cell phone: [phone])    # Nondisplaced femoral neck subcapital impaction fracture  - Pain control with Tylenol   - CT Pelvis Bony Only No Cont (03.14.21 @ 22:01): Multiple infiltrative bone lesions, largest at the right iliac crest with nondisplaced pathologic fracture and soft tissue mass extension. Findings are compatible with osseous metastasis. Consider nuclear medicine bone scan for the assessment of skeletal metastasis. Nondisplaced right subcapital femoral neck impaction fracture. Consider MRI of the right hip to evaluate extent of disease.  - Patient would like to speak with Oncology at [facility] first before deciding on surgery at [facility]    #Hyponatremia  - Na 129 today, will give trial of lasix 20 mg PO   - fluid restriction 1 L  - follow up BMP     # Mild hypercalcemia - resolved   - Ca 10.4, albumin 4.4  - Likely secondary to dehydration (patient reports not eating or drinking much because it was difficult for her to get around)  - Continue gentle IVF for now    # Gout  - Not on any medication  - Will avoid red meat diet while inpatient    DVT ppx: Lovenox 40mg qdaily  GI ppx: Protonix  Diet: Regular  Ambulatory status: as tolerated, NBW RLE   Lines: Peripheral IVs  Code status: FULL CODE  Dispo: acute

## 2021-03-18 NOTE — DIETITIAN INITIAL EVALUATION ADULT. - PERSON TAUGHT/METHOD
Discussed use of oral nutrition supplements to optimize po intake. Discussed high protein food sources, pt receptive and demonstrated understanding./patient instructed/verbal instruction

## 2021-03-18 NOTE — DIETITIAN INITIAL EVALUATION ADULT. - NAME AND PHONE
Nutrition Interventions: meals and snacks, medical food supplements RD to monitor diet order, energy intake, body composition, NFPF, electrolyte profile

## 2021-03-19 ENCOUNTER — INPATIENT (INPATIENT)
Facility: HOSPITAL | Age: 71
LOS: 3 days | Discharge: ANOTHER HOSPITAL | End: 2021-03-23
Attending: ORTHOPAEDIC SURGERY | Admitting: ORTHOPAEDIC SURGERY
Payer: MEDICARE

## 2021-03-19 VITALS
RESPIRATION RATE: 18 BRPM | DIASTOLIC BLOOD PRESSURE: 74 MMHG | SYSTOLIC BLOOD PRESSURE: 128 MMHG | TEMPERATURE: 97 F | HEART RATE: 101 BPM

## 2021-03-19 VITALS
HEIGHT: 63 IN | OXYGEN SATURATION: 95 % | DIASTOLIC BLOOD PRESSURE: 72 MMHG | RESPIRATION RATE: 16 BRPM | SYSTOLIC BLOOD PRESSURE: 121 MMHG | TEMPERATURE: 98 F | HEART RATE: 95 BPM

## 2021-03-19 DIAGNOSIS — S72.009A FRACTURE OF UNSPECIFIED PART OF NECK OF UNSPECIFIED FEMUR, INITIAL ENCOUNTER FOR CLOSED FRACTURE: ICD-10-CM

## 2021-03-19 DIAGNOSIS — Z01.818 ENCOUNTER FOR OTHER PREPROCEDURAL EXAMINATION: ICD-10-CM

## 2021-03-19 DIAGNOSIS — D64.9 ANEMIA, UNSPECIFIED: ICD-10-CM

## 2021-03-19 DIAGNOSIS — Z90.710 ACQUIRED ABSENCE OF BOTH CERVIX AND UTERUS: Chronic | ICD-10-CM

## 2021-03-19 DIAGNOSIS — E87.1 HYPO-OSMOLALITY AND HYPONATREMIA: ICD-10-CM

## 2021-03-19 PROBLEM — Z00.00 ENCOUNTER FOR PREVENTIVE HEALTH EXAMINATION: Status: ACTIVE | Noted: 2021-03-19

## 2021-03-19 LAB
ALBUMIN SERPL ELPH-MCNC: 3.2 G/DL — LOW (ref 3.3–5)
ALBUMIN SERPL ELPH-MCNC: 3.2 G/DL — LOW (ref 3.5–5.2)
ALP SERPL-CCNC: 123 U/L — HIGH (ref 40–120)
ALP SERPL-CCNC: 124 U/L — HIGH (ref 30–115)
ALT FLD-CCNC: 10 U/L — SIGNIFICANT CHANGE UP (ref 0–41)
ALT FLD-CCNC: 13 U/L — SIGNIFICANT CHANGE UP (ref 4–33)
ANION GAP SERPL CALC-SCNC: 12 MMOL/L — SIGNIFICANT CHANGE UP (ref 7–14)
ANION GAP SERPL CALC-SCNC: 12 MMOL/L — SIGNIFICANT CHANGE UP (ref 7–14)
APTT BLD: 23 SEC — LOW (ref 27–36.3)
AST SERPL-CCNC: 19 U/L — SIGNIFICANT CHANGE UP (ref 0–41)
AST SERPL-CCNC: 20 U/L — SIGNIFICANT CHANGE UP (ref 4–32)
BASOPHILS # BLD AUTO: 0.05 K/UL — SIGNIFICANT CHANGE UP (ref 0–0.2)
BASOPHILS NFR BLD AUTO: 0.5 % — SIGNIFICANT CHANGE UP (ref 0–2)
BILIRUB SERPL-MCNC: 0.4 MG/DL — SIGNIFICANT CHANGE UP (ref 0.2–1.2)
BILIRUB SERPL-MCNC: 0.4 MG/DL — SIGNIFICANT CHANGE UP (ref 0.2–1.2)
BLD GP AB SCN SERPL QL: NEGATIVE — SIGNIFICANT CHANGE UP
BUN SERPL-MCNC: 13 MG/DL — SIGNIFICANT CHANGE UP (ref 10–20)
BUN SERPL-MCNC: 14 MG/DL — SIGNIFICANT CHANGE UP (ref 7–23)
CALCIUM SERPL-MCNC: 9.4 MG/DL — SIGNIFICANT CHANGE UP (ref 8.4–10.5)
CALCIUM SERPL-MCNC: 9.5 MG/DL — SIGNIFICANT CHANGE UP (ref 8.5–10.1)
CHLORIDE SERPL-SCNC: 87 MMOL/L — LOW (ref 98–110)
CHLORIDE SERPL-SCNC: 89 MMOL/L — LOW (ref 98–107)
CO2 SERPL-SCNC: 29 MMOL/L — SIGNIFICANT CHANGE UP (ref 22–31)
CO2 SERPL-SCNC: 30 MMOL/L — SIGNIFICANT CHANGE UP (ref 17–32)
CREAT SERPL-MCNC: 0.57 MG/DL — SIGNIFICANT CHANGE UP (ref 0.5–1.3)
CREAT SERPL-MCNC: 0.6 MG/DL — LOW (ref 0.7–1.5)
EOSINOPHIL # BLD AUTO: 0.24 K/UL — SIGNIFICANT CHANGE UP (ref 0–0.5)
EOSINOPHIL NFR BLD AUTO: 2.6 % — SIGNIFICANT CHANGE UP (ref 0–6)
GLUCOSE SERPL-MCNC: 90 MG/DL — SIGNIFICANT CHANGE UP (ref 70–99)
GLUCOSE SERPL-MCNC: 91 MG/DL — SIGNIFICANT CHANGE UP (ref 70–99)
HCT VFR BLD CALC: 30.5 % — LOW (ref 37–47)
HCT VFR BLD CALC: 30.8 % — LOW (ref 34.5–45)
HGB BLD-MCNC: 10.2 G/DL — LOW (ref 11.5–15.5)
HGB BLD-MCNC: 10.3 G/DL — LOW (ref 12–16)
IANC: 6.96 K/UL — SIGNIFICANT CHANGE UP (ref 1.5–8.5)
IMM GRANULOCYTES NFR BLD AUTO: 1.3 % — SIGNIFICANT CHANGE UP (ref 0–1.5)
INR BLD: 1.22 RATIO — HIGH (ref 0.88–1.16)
LYMPHOCYTES # BLD AUTO: 1.16 K/UL — SIGNIFICANT CHANGE UP (ref 1–3.3)
LYMPHOCYTES # BLD AUTO: 12.4 % — LOW (ref 13–44)
MAGNESIUM SERPL-MCNC: 2.1 MG/DL — SIGNIFICANT CHANGE UP (ref 1.6–2.6)
MCHC RBC-ENTMCNC: 30.7 PG — SIGNIFICANT CHANGE UP (ref 27–34)
MCHC RBC-ENTMCNC: 31.2 PG — HIGH (ref 27–31)
MCHC RBC-ENTMCNC: 33.1 GM/DL — SIGNIFICANT CHANGE UP (ref 32–36)
MCHC RBC-ENTMCNC: 33.8 G/DL — SIGNIFICANT CHANGE UP (ref 32–37)
MCV RBC AUTO: 92.4 FL — SIGNIFICANT CHANGE UP (ref 81–99)
MCV RBC AUTO: 92.8 FL — SIGNIFICANT CHANGE UP (ref 80–100)
MONOCYTES # BLD AUTO: 0.81 K/UL — SIGNIFICANT CHANGE UP (ref 0–0.9)
MONOCYTES NFR BLD AUTO: 8.7 % — SIGNIFICANT CHANGE UP (ref 2–14)
NEUTROPHILS # BLD AUTO: 6.96 K/UL — SIGNIFICANT CHANGE UP (ref 1.8–7.4)
NEUTROPHILS NFR BLD AUTO: 74.5 % — SIGNIFICANT CHANGE UP (ref 43–77)
NRBC # BLD: 0 /100 WBCS — SIGNIFICANT CHANGE UP
NRBC # BLD: 0 /100 WBCS — SIGNIFICANT CHANGE UP (ref 0–0)
NRBC # FLD: 0 K/UL — SIGNIFICANT CHANGE UP
PHOSPHATE SERPL-MCNC: 4.6 MG/DL — HIGH (ref 2.5–4.5)
PLATELET # BLD AUTO: 207 K/UL — SIGNIFICANT CHANGE UP (ref 150–400)
PLATELET # BLD AUTO: 237 K/UL — SIGNIFICANT CHANGE UP (ref 130–400)
POTASSIUM SERPL-MCNC: 4.1 MMOL/L — SIGNIFICANT CHANGE UP (ref 3.5–5.3)
POTASSIUM SERPL-MCNC: 4.8 MMOL/L — SIGNIFICANT CHANGE UP (ref 3.5–5)
POTASSIUM SERPL-SCNC: 4.1 MMOL/L — SIGNIFICANT CHANGE UP (ref 3.5–5.3)
POTASSIUM SERPL-SCNC: 4.8 MMOL/L — SIGNIFICANT CHANGE UP (ref 3.5–5)
PROT SERPL-MCNC: 6.2 G/DL — SIGNIFICANT CHANGE UP (ref 6–8)
PROT SERPL-MCNC: 6.5 G/DL — SIGNIFICANT CHANGE UP (ref 6–8.3)
PROTHROM AB SERPL-ACNC: 13.8 SEC — HIGH (ref 10.6–13.6)
RBC # BLD: 3.3 M/UL — LOW (ref 4.2–5.4)
RBC # BLD: 3.32 M/UL — LOW (ref 3.8–5.2)
RBC # FLD: 13.2 % — SIGNIFICANT CHANGE UP (ref 11.5–14.5)
RBC # FLD: 13.6 % — SIGNIFICANT CHANGE UP (ref 10.3–14.5)
RH IG SCN BLD-IMP: POSITIVE — SIGNIFICANT CHANGE UP
SARS-COV-2 RNA SPEC QL NAA+PROBE: SIGNIFICANT CHANGE UP
SARS-COV-2 RNA SPEC QL NAA+PROBE: SIGNIFICANT CHANGE UP
SODIUM SERPL-SCNC: 129 MMOL/L — LOW (ref 135–146)
SODIUM SERPL-SCNC: 130 MMOL/L — LOW (ref 135–145)
SODIUM UR-SCNC: 80 MMOL/L — SIGNIFICANT CHANGE UP
WBC # BLD: 9.3 K/UL — SIGNIFICANT CHANGE UP (ref 4.8–10.8)
WBC # BLD: 9.34 K/UL — SIGNIFICANT CHANGE UP (ref 3.8–10.5)
WBC # FLD AUTO: 9.3 K/UL — SIGNIFICANT CHANGE UP (ref 4.8–10.8)
WBC # FLD AUTO: 9.34 K/UL — SIGNIFICANT CHANGE UP (ref 3.8–10.5)

## 2021-03-19 PROCEDURE — 71045 X-RAY EXAM CHEST 1 VIEW: CPT | Mod: 26

## 2021-03-19 PROCEDURE — 99223 1ST HOSP IP/OBS HIGH 75: CPT

## 2021-03-19 PROCEDURE — 27236 TREAT THIGH FRACTURE: CPT | Mod: RT

## 2021-03-19 PROCEDURE — 99221 1ST HOSP IP/OBS SF/LOW 40: CPT

## 2021-03-19 PROCEDURE — 73552 X-RAY EXAM OF FEMUR 2/>: CPT | Mod: 26,RT

## 2021-03-19 PROCEDURE — 99285 EMERGENCY DEPT VISIT HI MDM: CPT | Mod: CS

## 2021-03-19 PROCEDURE — 72170 X-RAY EXAM OF PELVIS: CPT | Mod: 26

## 2021-03-19 PROCEDURE — 99233 SBSQ HOSP IP/OBS HIGH 50: CPT

## 2021-03-19 RX ORDER — PANTOPRAZOLE SODIUM 20 MG/1
40 TABLET, DELAYED RELEASE ORAL
Refills: 0 | Status: DISCONTINUED | OUTPATIENT
Start: 2021-03-19 | End: 2021-03-23

## 2021-03-19 RX ORDER — SODIUM CHLORIDE 9 MG/ML
1000 INJECTION INTRAMUSCULAR; INTRAVENOUS; SUBCUTANEOUS
Refills: 0 | Status: DISCONTINUED | OUTPATIENT
Start: 2021-03-19 | End: 2021-03-20

## 2021-03-19 RX ORDER — CALCIUM CARBONATE 500(1250)
1 TABLET ORAL ONCE
Refills: 0 | Status: COMPLETED | OUTPATIENT
Start: 2021-03-19 | End: 2021-03-19

## 2021-03-19 RX ORDER — OXYCODONE HYDROCHLORIDE 5 MG/1
10 TABLET ORAL EVERY 4 HOURS
Refills: 0 | Status: DISCONTINUED | OUTPATIENT
Start: 2021-03-19 | End: 2021-03-20

## 2021-03-19 RX ORDER — SENNA PLUS 8.6 MG/1
2 TABLET ORAL AT BEDTIME
Refills: 0 | Status: DISCONTINUED | OUTPATIENT
Start: 2021-03-19 | End: 2021-03-23

## 2021-03-19 RX ORDER — CHLORHEXIDINE GLUCONATE 213 G/1000ML
1 SOLUTION TOPICAL EVERY 12 HOURS
Refills: 0 | Status: DISCONTINUED | OUTPATIENT
Start: 2021-03-20 | End: 2021-03-20

## 2021-03-19 RX ORDER — POLYETHYLENE GLYCOL 3350 17 G/17G
17 POWDER, FOR SOLUTION ORAL DAILY
Refills: 0 | Status: DISCONTINUED | OUTPATIENT
Start: 2021-03-19 | End: 2021-03-19

## 2021-03-19 RX ORDER — POVIDONE-IODINE 5 %
1 AEROSOL (ML) TOPICAL ONCE
Refills: 0 | Status: COMPLETED | OUTPATIENT
Start: 2021-03-20 | End: 2021-03-20

## 2021-03-19 RX ORDER — OXYCODONE HYDROCHLORIDE 5 MG/1
5 TABLET ORAL EVERY 4 HOURS
Refills: 0 | Status: DISCONTINUED | OUTPATIENT
Start: 2021-03-19 | End: 2021-03-20

## 2021-03-19 RX ORDER — POLYETHYLENE GLYCOL 3350 17 G/17G
17 POWDER, FOR SOLUTION ORAL ONCE
Refills: 0 | Status: COMPLETED | OUTPATIENT
Start: 2021-03-19 | End: 2021-03-19

## 2021-03-19 RX ORDER — ENOXAPARIN SODIUM 100 MG/ML
40 INJECTION SUBCUTANEOUS ONCE
Refills: 0 | Status: COMPLETED | OUTPATIENT
Start: 2021-03-19 | End: 2021-03-19

## 2021-03-19 RX ORDER — PANTOPRAZOLE SODIUM 20 MG/1
40 TABLET, DELAYED RELEASE ORAL
Refills: 0 | Status: DISCONTINUED | OUTPATIENT
Start: 2021-03-19 | End: 2021-03-19

## 2021-03-19 RX ADMIN — OXYCODONE HYDROCHLORIDE 5 MILLIGRAM(S): 5 TABLET ORAL at 18:23

## 2021-03-19 RX ADMIN — ENOXAPARIN SODIUM 40 MILLIGRAM(S): 100 INJECTION SUBCUTANEOUS at 16:47

## 2021-03-19 RX ADMIN — Medication 1 TABLET(S): at 02:20

## 2021-03-19 RX ADMIN — Medication 5 MILLIGRAM(S): at 02:20

## 2021-03-19 RX ADMIN — SODIUM CHLORIDE 125 MILLILITER(S): 9 INJECTION INTRAMUSCULAR; INTRAVENOUS; SUBCUTANEOUS at 19:26

## 2021-03-19 RX ADMIN — PANTOPRAZOLE SODIUM 40 MILLIGRAM(S): 20 TABLET, DELAYED RELEASE ORAL at 06:29

## 2021-03-19 RX ADMIN — Medication 20 MILLIGRAM(S): at 06:34

## 2021-03-19 NOTE — ED ADULT NURSE NOTE - NSIMPLEMENTINTERV_GEN_ALL_ED
Implemented All Fall Risk Interventions:  [place] to call system. Call bell, personal items and telephone within reach. Instruct patient to call for assistance. Room bathroom lighting operational. Non-slip footwear when patient is off stretcher. Physically safe environment: no spills, clutter or unnecessary equipment. Stretcher in lowest position, wheels locked, appropriate side rails in place. Provide visual cue, wrist band, yellow gown, etc. Monitor gait and stability. Monitor for mental status changes and reorient to person, place, and time. Review medications for side effects contributing to fall risk. Reinforce activity limits and safety measures with patient and family.

## 2021-03-19 NOTE — PROGRESS NOTE ADULT - ATTENDING COMMENTS
Patient seen and examined independently. Agree with resident note.  #Rt perihilar mass-- thoracentesis revealed adeno ca. Oncology has seen patient and requesting MRI Brain and CT abd and pelvis.  Patient refusing chemo and likely will do immunotherapy.  #Rt femoral non displaced subcapital fracture-- patient will go to [facility] Dr Lord for fixation and she is preparing for it but currently wants more treatment options--  # Hypercalcemia has currently resolved.
Autumn Allan MD  Hospitalist   Spectra: [phone]    Patient is a 70y old  Female who presents with a chief complaint of difficulty ambulation (16 Mar 2021 10:03)      INTERVAL HPI/OVERNIGHT EVENTS: s/p thora yesterday   tolerated procedure well   no acute overnight events noted   ortho wants her to get transferred to [facility] for surgical fixation of femoral neck fracture   discussed with patient - she wants it to be discussed with her family before transfer   awaiting respose from patient before transfer     REVIEW OF SYSTEMS: negative  Vital Signs Last 24 Hrs  T(C): 36.3 (16 Mar 2021 07:15), Max: 36.3 (16 Mar 2021 00:00)  T(F): 97.4 (16 Mar 2021 07:15), Max: 97.4 (16 Mar 2021 00:00)  HR: 69 (16 Mar 2021 07:15) (69 - 107)  BP: 104/74 (16 Mar 2021 07:15) (104/74 - 167/79)  BP(mean): --  RR: 19 (16 Mar 2021 07:15) (18 - 19)  SpO2: 96% (16 Mar 2021 07:15) (96% - 96%)    PHYSICAL EXAM:   NAD; Normocephalic;   LUNGS - no wheezing  HEART: S1 S2+   ABDOMEN: Soft, Nontender, non distended  EXTREMITIES: no cyanosis; no edema  NERVOUS SYSTEM:  Awake and alert; no focal neuro deficits appreciated    LABS:                        10.2   10.94 )-----------( 185      ( 16 Mar 2021 06:14 )             30.5     03-16    134<L>  |  96<L>  |  14  ----------------------------<  97  4.6   |  29  |  0.5<L>    Ca    9.3      16 Mar 2021 06:14  Mg     1.8     03-16    TPro  5.9<L>  /  Alb  3.2<L>  /  TBili  0.4  /  DBili  x   /  AST  15  /  ALT  8   /  AlkPhos  112  03-16    A/P:-  Pt is seen and evaluated by bedside.   1. Nondisplaced femoral neck subcapital impaction fracture  2. FRYE- right perihillar mass   3. ambulatory dysfunction   4. gout   5. obestiy     - patient presented with ambulatory dysfunction 2/2 chronic pain and dyspnea with minimal exertion   - Xray Hip: There is patchy sclerosis at the right femoral head consistent with secondary AVN. Band of sclerosis with mild angulation of the femoral head neck junction suggests nondisplaced femoral neck subcapital impaction fracture.   - ortho recommends transfer to [facility] for surgical fixation of femoral neck fracture with Dr. Lord who will be able to do surgery possibly tomorrow   - currently patient is uncertain about transfer and wants to discuss with family  - awaiting decision from patient to initiate transfer   - chest xray - right perihilar mass   - CT pelvis - Multiple infiltrative bone lesions, largest at the right iliac crest with nondisplaced pathologic fracture and soft tissue mass extension. Findings are compatible with osseous metastasis. Consider nuclear medicine bone scan for the assessment of skeletal metastasis. Nondisplaced right subcapital femoral neck impaction fracture.   - CT chest:  Poorly defined malignant right upper lobe mass extending into the right hilum and mediastinum with occlusion of the right upper lobe airways. Metastatic osteolytic right scapular lesion. Acute mildly displaced left glenoid fracture and age-indeterminate T11 compression deformity, underlying pathologic fractures are not excluded. Left greater than right bilateral adrenal nodularity, likely metastatic.  - s/p bedside thora by pulm - 1000 cc of serosanguinous fluid taken out   - follow cytology    - follow ortho for further recs   - echo - EF of 53%  - calcium level improved with hydration   - lovenox     Dispo: possible transfer to [facility] for ortho intervention   work up for perihilar mass in progress - awaiting results of cytology     Plan of care was discussed with patient ; all questions and concerns were addressed and care was aligned with patient's wishes.
Autumn Allan MD  Hospitalist   Spectra: [phone]    Patient is a 70y old  Female who presents with a chief complaint of difficulty ambulation (15 Mar 2021 12:41)      INTERVAL HPI/OVERNIGHT EVENTS: continues to have tachypnea on minimal exertion     REVIEW OF SYSTEMS: negative  Vital Signs Last 24 Hrs  T(C): 35.7 (15 Mar 2021 08:17), Max: 36 (14 Mar 2021 16:51)  T(F): 96.3 (15 Mar 2021 08:17), Max: 96.8 (14 Mar 2021 16:51)  HR: 88 (15 Mar 2021 08:17) (88 - 107)  BP: 164/97 (15 Mar 2021 08:17) (149/76 - 164/97)  BP(mean): --  RR: 18 (15 Mar 2021 08:17) (18 - 22)  SpO2: 94% (14 Mar 2021 18:56) (84% - 94%)    PHYSICAL EXAM:   NAD; Normocephalic;   LUNGS - no wheezing  HEART: S1 S2+   ABDOMEN: Soft, Nontender, non distended  EXTREMITIES: no cyanosis; no edema  NERVOUS SYSTEM:  Awake and alert; no focal neuro deficits appreciated    LABS:                        10.1   9.52  )-----------( 172      ( 15 Mar 2021 06:18 )             30.8     03-15    134<L>  |  98  |  16  ----------------------------<  93  4.4   |  24  |  0.6<L>    Ca    9.7      15 Mar 2021 06:18  Phos  4.1     03-14  Mg     1.8     03-15    TPro  6.2  /  Alb  3.3<L>  /  TBili  0.5  /  DBili  x   /  AST  17  /  ALT  9   /  AlkPhos  108  03-15      Urinalysis Basic - ( 14 Mar 2021 08:00 )    Color: Yellow / Appearance: Slightly Turbid / S.027 / pH: x  Gluc: x / Ketone: Trace  / Bili: Negative / Urobili: <2 mg/dL   Blood: x / Protein: Trace / Nitrite: Positive   Leuk Esterase: Negative / RBC: 1 /HPF / WBC 2 /HPF   Sq Epi: x / Non Sq Epi: 4 /HPF / Bacteria: Many      CAPILLARY BLOOD GLUCOSE          A/P:-  Pt is seen and evaluated by bedside.   Plan:     1. Nondisplaced femoral neck subcapital impaction fracture  2. FRYE- right perihillar mass   3. ambulatory dysfunction   4. gout   5. obestiy     - patient presented with ambulatory dysfunction 2/2 chronic pain and dyspnea with minimal exertion   - Xray Hip: There is patchy sclerosis at the right femoral head consistent with secondary AVN. Band of sclerosis with mild angulation of the femoral head neck junction suggests nondisplaced femoral neck subcapital impaction fracture.   - ortho consulted as pain is co-relating with fracture site   - chest xray - right perihilar mass   - CT pelvis - Multiple infiltrative bone lesions, largest at the right iliac crest with nondisplaced pathologic fracture and soft tissue mass extension. Findings are compatible with osseous metastasis. Consider nuclear medicine bone scan for the assessment of skeletal metastasis. Nondisplaced right subcapital femoral neck impaction fracture.   - CT chest:  Poorly defined malignant right upper lobe mass extending into the right hilum and mediastinum with occlusion of the right upper lobe airways. Metastatic osteolytic right scapular lesion. Acute mildly displaced left glenoid fracture and age-indeterminate T11 compression deformity, underlying pathologic fractures are not excluded. Left greater than right bilateral adrenal nodularity, likely metastatic.  - s/p bedside thora by pulm - 1000 cc of serosanguinous fluid taken out   - follow cytology    - mri hip   - follow ortho for further recs   - follow echo   - calcium level improved with hydration   - lovenox     Dispo: acute at this time, ortho and perihilar mass work up in progress   updates given to sister Carmita over phone.   Plan of care was discussed with patient ; all questions and concerns were addressed and care was aligned with patient's wishes.
Patient seen and examined independently. Agree with resident note.  #Rt perihilar mass-- thoracentesis revealed adeno ca. Oncology has seen patient and requesting MRI Brain and CT abd and pelvis.  Patient refusing chemo and likely will do immunotherapy.  #Rt femoral non displaced subcapital fracture-- patient will go to [facility] Dr Lord for fixation and she is preparing for it  spoke with Dr Lord [phone]-- patient will be transferred to [facility] in AM.  # Hypercalcemia has currently resolved.  # hyponatremia-- will give her a low dose of lasix today. as it could be due to SIADH.  Dr Lord said surgery will be done on saturday.
Patient seen and examined independently. Agree with resident note.  #Rt perihilar mass-- thoracentesis revealed adeno ca. Oncology has seen patient and requesting MRI Brain and CT abd and pelvis.--multiple hepatic and adrenal lesion noted-- metastatic disease.  Patient refusing chemo and likely will do immunotherapy.  #Rt femoral non displaced subcapital fracture-- patient will go to [facility] Dr Lord for fixation and she is preparing for it  spoke with Dr Lord [phone]-- patient  transferred today  # Hypercalcemia has currently resolved.  # hyponatremia-- was given one dose of po lasix 20mg yesterday-- it could be due to SIADH. She is on fluid restriction.  Dr Lord said surgery will be done on Saturday.    Further oncology w/u to be done when patient returns to [facility].

## 2021-03-19 NOTE — ED PROVIDER NOTE - PHYSICAL EXAMINATION
PHYSICAL EXAM:    Vital Signs Last 24 Hrs  T(C): 36.7 (19 Mar 2021 12:57), Max: 36.7 (19 Mar 2021 12:57)  T(F): 98.1 (19 Mar 2021 12:57), Max: 98.1 (19 Mar 2021 12:57)  HR: 95 (19 Mar 2021 12:57) (95 - 112)  BP: 121/72 (19 Mar 2021 12:57) (121/72 - 150/73)  BP(mean): --  RR: 16 (19 Mar 2021 12:57) (16 - 18)  SpO2: 95% (19 Mar 2021 12:57) (95% - 95%)    General: No acute distress.  HEENT: NCAT.  PERRL.  EOMI.  No scleral icterus or injection.  Moist MM.  No oropharyngeal exudates.    Neck: Supple.  Full ROM.  No JVD.  No thyromegaly. No lymphadenopathy.   Heart: RRR.  Normal S1 and S2.  No murmurs, rubs, or gallops.   Lungs: Absent breath sounds on RLL posteriorly. Normal breath sounds on L.  Abdomen: BS+, soft, NT/ND.  No organomegaly.  Skin: Warm and dry.  No rashes.  Extremities: No edema, clubbing, or cyanosis.  2+ peripheral pulses b/l.  Musculoskeletal: No overt swelling or pain to palpation over R shoulder. No R hip swelling, ROM deferred due to pain and established diagnosis.  Neuro: A&Ox3.

## 2021-03-19 NOTE — ED ADULT TRIAGE NOTE - CHIEF COMPLAINT QUOTE
EMS states patient is coming from [facility] as transfer for fractured right hip. h/o recent diagnosis of  cancer lung . not on any therapy.

## 2021-03-19 NOTE — PROGRESS NOTE ADULT - PROVIDER SPECIALTY LIST ADULT
Orthopedics
Internal Medicine
Orthopedics
Internal Medicine

## 2021-03-19 NOTE — ED CLERICAL - NS ED CLERK NOTE PRE-ARRIVAL INFORMATION; ADDITIONAL PRE-ARRIVAL INFORMATION
transfer from [facility] for admission to oncology with ortho for hip fracture management. recent diagnosis of lung ca not on any treatment

## 2021-03-19 NOTE — ED PROVIDER NOTE - CLINICAL SUMMARY MEDICAL DECISION MAKING FREE TEXT BOX
70 years old female from home and lives alone with PMHx of OA of bilateral knees, right rotator ruff tear (?), gout, diverticulosis, presented with difficulty ambulation. Patient started to have worsening bilateral hip pain, and right shoulder pain, severely hindering her daily activities found to have R femoral, R glenomhumral fracture, and stage IV lung adenocarcinoma presenting to [facility] for transfer of care to Dr. Lord and further staging. Will obtain preop labs, covid swab and EKG. Presently, not complaining of acute pain. 70 years old female xferred to [facility] from [facility] for ortho eval. PMHx sig for OA of bilateral knees, right rotator ruff tear (?), gout, diverticulosis found to have R femoral, R glenomhumral fracture, and stage IV lung adenocarcinoma presenting to [facility] for transfer of care to Dr. Lord and further staging. Will obtain preop labs, covid swab and EKG. Presently, not complaining of acute pain.

## 2021-03-19 NOTE — PROGRESS NOTE ADULT - NUTRITIONAL ASSESSMENT
This patient has been assessed with a concern for Malnutrition and has been determined to have a diagnosis/diagnoses of Moderate protein-calorie malnutrition.    This patient is being managed with:   Diet Regular-  Lactose Restricted (Milk Sugar Intoler.)  No Beef  No Seeds  No Shellfish  Prosource Gelatein Plus     Qty per Day:  2  Supplement Feeding Modality:  Oral  Ensure Pudding Cans or Servings Per Day:  1       Frequency:  Two Times a day  Entered: Mar 18 2021  1:04PM

## 2021-03-19 NOTE — ED ADULT NURSE NOTE - OBJECTIVE STATEMENT
Patient alert and awake, oriented x4, breathing with ease on room air, reports going to ED last Saturday to [facility] due to increasing pain, weakness, and unable to care of self with daily needs such as self care and nutrition/hydration needs. Patient was informed that she had stage 4 lung CA and right hip fx that will require surgical intervention, pending surgery by Dr Lord tomorrow. Patient with skin intact, reports baseline ambulation without assistive devices. Patient denies any falls or trauma, denies any oxygen dependency at home and only started using oxygen at [facility].

## 2021-03-19 NOTE — ED PROVIDER NOTE - ATTENDING CONTRIBUTION TO CARE
Attending Attestation: Dr. Helm  I have personally performed a history and physical examination of the patient and discussed management with the resident as well as the patient.  I reviewed the resident's note and agree with the documented findings and plan of care.  I have authored and modified critical sections of the Provider Note, including but not limited to HPI, Physical Exam and MDM. 70 years old female xferred to [facility] from [facility] for ortho eval. PMHx sig for OA of bilateral knees, right rotator ruff tear (?), gout, diverticulosis found to have R femoral, R glenomhumral fracture, and stage IV lung adenocarcinoma presenting to [facility] for transfer of care to Dr. Lord and further staging. Will obtain preop labs, covid swab and EKG. Presently, not complaining of acute pain.

## 2021-03-19 NOTE — H&P ADULT - HISTORY OF PRESENT ILLNESS
71 yo female transferred from [facility] for right hip hemiarthroplasty with Dr Lord.  Pt states ill feeling last few mos, loss appetite, weight loss.  Pt last walked independently Nov/Dec 2020 but has had progressive back pain and now walks with walker.  Pt, presented to [facility] on 3/13/21 where XR  reveal non displaced Fnfx, R scapula osteolytic lesion, L glenoid fracture, T11 compression deformity.  Pt S/P thoracoscentesis, positive for malignant cells.  BS reveals metastatic disease to ribs, bilat Ilium, R SI joint, bilat IT ridges.  TTE 3/16-EF 53%.

## 2021-03-19 NOTE — ED ADULT NURSE NOTE - CHIEF COMPLAINT QUOTE
EMS states patient is coming from [facility] as transfer for fractured right hip. h/o recent diagnosis of  cancer lung . not on any therapy.

## 2021-03-19 NOTE — ED PROVIDER NOTE - OBJECTIVE STATEMENT
70 years old female from home and lives alone with PMHx of OA of bilateral knees, right rotator ruff tear (?), gout, diverticulosis, presented with difficulty ambulation. Patient started to have worsening bilateral hip pain, and right shoulder pain, severely hindering her daily activities. She also began having worsening shortness of breath since December. When she realized her appetite was poor and she was unable to care for herself, she called an ambulance. She was admitted to [place] where she was found to have a R glenohumoral and R hip fracture with unknown precipitating trauma in addition to metastatic adenocarcionoma s/p thoracentesis of 1 L. She is now transferred to [facility] for evaluation by Dr. Lord.

## 2021-03-19 NOTE — H&P ADULT - EXTREMITIES COMMENTS
right lower extremity short/ external rotated, pain w log roll/ heel strike  EHL/GC/TA 5/5, sensory intact, DP pulse 2+

## 2021-03-19 NOTE — CONSULT NOTE ADULT - PROBLEM SELECTOR RECOMMENDATION 3
-per patient, she has had decreased PO intake, suspect likely pre-renal  -encourage PO intake  -can order serum osm, urine na, and urine Osm

## 2021-03-19 NOTE — PROGRESS NOTE ADULT - REASON FOR ADMISSION
difficulty ambulation

## 2021-03-19 NOTE — PROGRESS NOTE ADULT - SUBJECTIVE AND OBJECTIVE BOX
DOV HENLEY 70y Female  MRN#: [id number]   Hospital Day: 6d    SUBJECTIVE  Patient is a 70y old Female who presents with a chief complaint of difficulty ambulation (18 Mar 2021 12:42)  Currently admitted to medicine with the primary diagnosis of Pathological fracture of other site, unspecified pathological cause, initial encounter      INTERVAL HPI AND OVERNIGHT EVENTS:  Patient was examined and seen at bedside. This morning she is resting comfortably in bed and reports no issues or overnight events.      OBJECTIVE  PAST MEDICAL & SURGICAL HISTORY  Diverticulosis    Gout    OA (osteoarthritis)    H/O total hysterectomy      ALLERGIES:  moxifloxacin (Anaphylaxis (Moderate))    MEDICATIONS:  STANDING MEDICATIONS  chlorhexidine 4% Liquid 1 Application(s) Topical <User Schedule>  enoxaparin Injectable 40 milliGRAM(s) SubCutaneous daily  furosemide    Tablet 20 milliGRAM(s) Oral daily  ondansetron Injectable 4 milliGRAM(s) IV Push once  pantoprazole    Tablet 40 milliGRAM(s) Oral before breakfast  polyethylene glycol 3350 17 Gram(s) Oral daily    PRN MEDICATIONS  acetaminophen   Tablet .. 650 milliGRAM(s) Oral every 6 hours PRN      VITAL SIGNS: Last 24 Hours  T(C): 36.3 (19 Mar 2021 08:00), Max: 36.6 (18 Mar 2021 20:15)  T(F): 97.3 (19 Mar 2021 08:00), Max: 97.8 (18 Mar 2021 20:15)  HR: 101 (19 Mar 2021 08:00) (101 - 112)  BP: 128/74 (19 Mar 2021 08:00) (128/74 - 150/73)  BP(mean): --  RR: 18 (19 Mar 2021 08:00) (18 - 18)  SpO2: 96% (18 Mar 2021 12:00) (96% - 96%)    LABS:                        10.3   9.30  )-----------( 237      ( 19 Mar 2021 05:28 )             30.5     03-19    129<L>  |  87<L>  |  13  ----------------------------<  91  4.8   |  30  |  0.6<L>    Ca    9.5      19 Mar 2021 05:28    TPro  6.2  /  Alb  3.2<L>  /  TBili  0.4  /  DBili  x   /  AST  19  /  ALT  10  /  AlkPhos  124<H>  03-19    PHYSICAL EXAM:  CONSTITUTIONAL: No acute distress, well-developed, well-groomed, AAOx3  HEAD: Atraumatic, normocephalic  EYES: EOM intact, PERRLA, conjunctiva and sclera clear  PULMONARY: Clear to auscultation bilaterally  CARDIOVASCULAR: Regular rate and rhythm  GASTROINTESTINAL: Soft, non-tender, non-distended  MUSCULOSKELETAL: no edema, no pain from site of femoral fracture   NEUROLOGY: non-focal  SKIN: No rashes or lesions    ASSESSMENT & PLAN  70 years old female from home and lives alone with PMHx of OA of bilateral knees, right rotator ruff tear (?), gout, diverticulosis, presented with difficulty ambulation due to worsening pain on hips and shoulders.    # Right Perihilar Mass w/ large left pleural effusion   - CT Chest No Cont (03.14.21 @ 18:33): Poorly defined malignant right upper lobe mass extending into the right hilum and mediastinum with occlusion of the right upper lobe airways. Metastatic osteolytic right scapular lesion. Acute mildly displaced left glenoid fracture and age-indeterminate T11 compression deformity, underlying pathologic fractures are not excluded. Left greater than right bilateral adrenal nodularity, likely metastatic.  - Echo: EF 53%   - Pulm consult: s/p thoracentesis, + malignant cells (adenocarcinoma)  - Bone Scan completed, see official report    - Patient and family aware of diagnosis of adenocarcinoma  - HemOnc consult completed: see note for full details  - plan: transfer to [facility] for surgery and patient can be transfered back to [facility] for further staging work up   - Accepting Orthopedist at [facility]: Dr. Parviz Lord (cell phone: [phone])    # Nondisplaced femoral neck subcapital impaction fracture  - Pain control with Tylenol   - CT Pelvis Bony Only No Cont (03.14.21 @ 22:01): Multiple infiltrative bone lesions, largest at the right iliac crest with nondisplaced pathologic fracture and soft tissue mass extension. Findings are compatible with osseous metastasis. Consider nuclear medicine bone scan for the assessment of skeletal metastasis. Nondisplaced right subcapital femoral neck impaction fracture. Consider MRI of the right hip to evaluate extent of disease.  - Patient would like to speak with Oncology at [facility] first before deciding on surgery at [facility]    #Hyponatremia  - Na 129 today, will give trial of lasix 20 mg PO   - fluid restriction 1 L  - follow up BMP     # Mild hypercalcemia - resolved   - Ca 10.4, albumin 4.4  - Likely secondary to dehydration (patient reports not eating or drinking much because it was difficult for her to get around)  - Continue gentle IVF for now    # Gout  - Not on any medication  - Will avoid red meat diet while inpatient    DVT ppx: Lovenox 40mg qdaily  GI ppx: Protonix  Diet: Regular  Ambulatory status: as tolerated, NBW RLE   Lines: Peripheral IVs  Code status: FULL CODE  Dispo: acute

## 2021-03-19 NOTE — CONSULT NOTE ADULT - SUBJECTIVE AND OBJECTIVE BOX
Internal Medicine Consult Note     70 y.o. F with PMH of OA of b/l knees, gout, diverticulosis, who initially presented to OSH on 3/13/21 with difficulty ambulating. Patient started to have worsening bilateral hip pain, and right shoulder pain, severely hindering her daily activities. She uses a walker to ambulate around her house but it has been getting more and more difficult complete her daily routine due to worsening pain. She also endorsed FRYE, nausea, and unintentional weight loss. At OSH, patient underwent CT of Chest which demonstrates poorly defined malignant right upper lobe mass extending into the right hilum and mediastinum with occlusion of the right upper lobe airways. She was also found to have moderate to large pleural effusion, now s/p thoracentesis with pleural effusion with cytology positive for adenocarcinoma. Ortho evaluated patient. Imaging was consistent with right femoral neck fracture in the setting of numerous lytic bone lesions suspicious for metastatic disease. She was transferred to [facility] for fixation of R femoral neck fracture and evaluation by orthopedic oncology specialist.       PAST MEDICAL & SURGICAL HISTORY:  Diverticulosis    Gout    OA (osteoarthritis)    H/O total hysterectomy      Allergies:  dairy products (Stomach Upset)  lactose (Unknown)  moxifloxacin (Anaphylaxis (Moderate))  shellfish (Stomach Upset)    Home Medications Reviewed  Hospital Medications:   MEDICATIONS  (STANDING):  pantoprazole    Tablet 40 milliGRAM(s) Oral before breakfast  senna 2 Tablet(s) Oral at bedtime  sodium chloride 0.9%. 1000 milliLiter(s) (125 mL/Hr) IV Continuous <Continuous>    SOCIAL HISTORY:  Denies ETOH, Former smoker, quit more than 20 years ago. Smoked 1.5 PPD x 20 years    FAMILY HISTORY:  T2DM - mother    REVIEW OF SYSTEMS:  CONSTITUTIONAL: + generalized weakness  EYES/ENT: No visual changes;  No vertigo or throat pain   NECK: No pain or stiffness  RESPIRATORY: No cough, wheezing, hemoptysis; + shortness of breath  CARDIOVASCULAR: No chest pain or palpitations.  GASTROINTESTINAL: No abdominal or epigastric pain. + nausea, no vomiting, or hematemesis; + constipation. No melena or hematochezia.  GENITOURINARY: No dysuria, frequency, foamy urine, urinary urgency, incontinence or hematuria  NEUROLOGICAL: No numbness or weakness  SKIN: No itching, burning, rashes, or lesions   VASCULAR: No bilateral lower extremity edema.   All other review of systems is negative unless indicated above.    VITALS:  T(F): 98.1 (21 @ 14:19), Max: 98.1 (21 @ 12:57)  HR: 88 (21 @ 14:19)  BP: 162/89 (21 @ 14:19)  RR: 16 (21 @ 14:19)  SpO2: 97% (21 @ 14:19)  Wt(kg): --    Height (cm): 160 ( @ 12:57)  PHYSICAL EXAM:  Constitutional: NAD  HEENT: anicteric sclera, oropharynx clear, MMM  Neck: No JVD  Respiratory: CTAB, no wheezes, rales or rhonchi  Cardiovascular: S1, S2, RRR  Gastrointestinal: BS+, soft, NT/ND  Extremities: No cyanosis or clubbing. No peripheral edema  Neurological: A/O x 3, no focal deficits  Psychiatric: Normal mood, normal affect  Skin: No rashes    LABS:      129<L>  |  87<L>  |  13  ----------------------------<  91  4.8   |  30  |  0.6<L>    Ca    9.5      19 Mar 2021 05:28    TPro  6.2  /  Alb  3.2<L>  /  TBili  0.4  /  DBili      /  AST  19  /  ALT  10  /  AlkPhos  124<H>      Creatinine Trend: 0.6 <--, 0.6 <--, 0.5 <--, 0.5 <--, 0.6 <--, 0.6 <--, 0.6 <--, 0.8 <--                        10.3   9.30  )-----------( 237      ( 19 Mar 2021 05:28 )             30.5     Urine Studies:  Urinalysis Basic - ( 14 Mar 2021 08:00 )    Color: Yellow / Appearance: Slightly Turbid / S.027 / pH:   Gluc:  / Ketone: Trace  / Bili: Negative / Urobili: <2 mg/dL   Blood:  / Protein: Trace / Nitrite: Positive   Leuk Esterase: Negative / RBC: 1 /HPF / WBC 2 /HPF   Sq Epi:  / Non Sq Epi: 4 /HPF / Bacteria: Many                RADIOLOGY & ADDITIONAL STUDIES:

## 2021-03-19 NOTE — H&P ADULT - ASSESSMENT
Admit to Ortho 9T Dr abdalla  NPO p MN for right hip hemiarthroplasty 3/20/21  Medicine hospitalist consulted for medical clearance  Lovenox x 1 - DVT ppx  Pain control.

## 2021-03-19 NOTE — CONSULT NOTE ADULT - ASSESSMENT
70 y.o. F with PMH of OA of b/l knees, gout, diverticulosis, who initially presented to OSH on 3/13/21 with difficulty ambulating. Work up revealed R femoral neck pathologic fracture in setting of newly diagnosed lung adenocarcinoma.

## 2021-03-20 ENCOUNTER — RESULT REVIEW (OUTPATIENT)
Age: 71
End: 2021-03-20

## 2021-03-20 LAB
24R-OH-CALCIDIOL SERPL-MCNC: 25.4 NG/ML — LOW (ref 30–80)
ALBUMIN SERPL ELPH-MCNC: 2.9 G/DL — LOW (ref 3.3–5)
ANION GAP SERPL CALC-SCNC: 11 MMOL/L — SIGNIFICANT CHANGE UP (ref 7–14)
ANION GAP SERPL CALC-SCNC: 8 MMOL/L — SIGNIFICANT CHANGE UP (ref 7–14)
APTT BLD: 25.1 SEC — LOW (ref 27–36.3)
BUN SERPL-MCNC: 13 MG/DL — SIGNIFICANT CHANGE UP (ref 7–23)
BUN SERPL-MCNC: 15 MG/DL — SIGNIFICANT CHANGE UP (ref 7–23)
CALCIUM SERPL-MCNC: 8.7 MG/DL — SIGNIFICANT CHANGE UP (ref 8.4–10.5)
CALCIUM SERPL-MCNC: 9.5 MG/DL — SIGNIFICANT CHANGE UP (ref 8.4–10.5)
CHLORIDE SERPL-SCNC: 92 MMOL/L — LOW (ref 98–107)
CHLORIDE SERPL-SCNC: 92 MMOL/L — LOW (ref 98–107)
CO2 SERPL-SCNC: 27 MMOL/L — SIGNIFICANT CHANGE UP (ref 22–31)
CO2 SERPL-SCNC: 31 MMOL/L — SIGNIFICANT CHANGE UP (ref 22–31)
CREAT SERPL-MCNC: 0.49 MG/DL — LOW (ref 0.5–1.3)
CREAT SERPL-MCNC: 0.63 MG/DL — SIGNIFICANT CHANGE UP (ref 0.5–1.3)
CULTURE RESULTS: NO GROWTH — SIGNIFICANT CHANGE UP
GLUCOSE SERPL-MCNC: 122 MG/DL — HIGH (ref 70–99)
GLUCOSE SERPL-MCNC: 93 MG/DL — SIGNIFICANT CHANGE UP (ref 70–99)
HCT VFR BLD CALC: 28.7 % — LOW (ref 34.5–45)
HCT VFR BLD CALC: 31.2 % — LOW (ref 34.5–45)
HGB BLD-MCNC: 10.1 G/DL — LOW (ref 11.5–15.5)
HGB BLD-MCNC: 9.5 G/DL — LOW (ref 11.5–15.5)
INR BLD: 1.2 RATIO — HIGH (ref 0.88–1.16)
MCHC RBC-ENTMCNC: 30 PG — SIGNIFICANT CHANGE UP (ref 27–34)
MCHC RBC-ENTMCNC: 30.9 PG — SIGNIFICANT CHANGE UP (ref 27–34)
MCHC RBC-ENTMCNC: 32.4 GM/DL — SIGNIFICANT CHANGE UP (ref 32–36)
MCHC RBC-ENTMCNC: 33.1 GM/DL — SIGNIFICANT CHANGE UP (ref 32–36)
MCV RBC AUTO: 92.6 FL — SIGNIFICANT CHANGE UP (ref 80–100)
MCV RBC AUTO: 93.5 FL — SIGNIFICANT CHANGE UP (ref 80–100)
NRBC # BLD: 0 /100 WBCS — SIGNIFICANT CHANGE UP
NRBC # BLD: 0 /100 WBCS — SIGNIFICANT CHANGE UP
NRBC # FLD: 0 K/UL — SIGNIFICANT CHANGE UP
NRBC # FLD: 0 K/UL — SIGNIFICANT CHANGE UP
PLATELET # BLD AUTO: 185 K/UL — SIGNIFICANT CHANGE UP (ref 150–400)
PLATELET # BLD AUTO: 222 K/UL — SIGNIFICANT CHANGE UP (ref 150–400)
POTASSIUM SERPL-MCNC: 4.1 MMOL/L — SIGNIFICANT CHANGE UP (ref 3.5–5.3)
POTASSIUM SERPL-MCNC: 4.1 MMOL/L — SIGNIFICANT CHANGE UP (ref 3.5–5.3)
POTASSIUM SERPL-SCNC: 4.1 MMOL/L — SIGNIFICANT CHANGE UP (ref 3.5–5.3)
POTASSIUM SERPL-SCNC: 4.1 MMOL/L — SIGNIFICANT CHANGE UP (ref 3.5–5.3)
PROTHROM AB SERPL-ACNC: 13.6 SEC — SIGNIFICANT CHANGE UP (ref 10.6–13.6)
RBC # BLD: 3.07 M/UL — LOW (ref 3.8–5.2)
RBC # BLD: 3.37 M/UL — LOW (ref 3.8–5.2)
RBC # FLD: 13.7 % — SIGNIFICANT CHANGE UP (ref 10.3–14.5)
RBC # FLD: 14.4 % — SIGNIFICANT CHANGE UP (ref 10.3–14.5)
SODIUM SERPL-SCNC: 130 MMOL/L — LOW (ref 135–145)
SODIUM SERPL-SCNC: 131 MMOL/L — LOW (ref 135–145)
SPECIMEN SOURCE: SIGNIFICANT CHANGE UP
WBC # BLD: 11.21 K/UL — HIGH (ref 3.8–10.5)
WBC # BLD: 8.85 K/UL — SIGNIFICANT CHANGE UP (ref 3.8–10.5)
WBC # FLD AUTO: 11.21 K/UL — HIGH (ref 3.8–10.5)
WBC # FLD AUTO: 8.85 K/UL — SIGNIFICANT CHANGE UP (ref 3.8–10.5)

## 2021-03-20 PROCEDURE — 73501 X-RAY EXAM HIP UNI 1 VIEW: CPT | Mod: 26,RT

## 2021-03-20 PROCEDURE — 88342 IMHCHEM/IMCYTCHM 1ST ANTB: CPT | Mod: 26,59

## 2021-03-20 PROCEDURE — 88341 IMHCHEM/IMCYTCHM EA ADD ANTB: CPT | Mod: 26,59

## 2021-03-20 PROCEDURE — 88305 TISSUE EXAM BY PATHOLOGIST: CPT | Mod: 26

## 2021-03-20 PROCEDURE — 88311 DECALCIFY TISSUE: CPT | Mod: 26

## 2021-03-20 PROCEDURE — 88360 TUMOR IMMUNOHISTOCHEM/MANUAL: CPT | Mod: 26

## 2021-03-20 RX ORDER — ACETAMINOPHEN 500 MG
1000 TABLET ORAL ONCE
Refills: 0 | Status: COMPLETED | OUTPATIENT
Start: 2021-03-20 | End: 2021-03-20

## 2021-03-20 RX ORDER — TRAMADOL HYDROCHLORIDE 50 MG/1
50 TABLET ORAL EVERY 6 HOURS
Refills: 0 | Status: DISCONTINUED | OUTPATIENT
Start: 2021-03-20 | End: 2021-03-20

## 2021-03-20 RX ORDER — POLYETHYLENE GLYCOL 3350 17 G/17G
17 POWDER, FOR SOLUTION ORAL DAILY
Refills: 0 | Status: DISCONTINUED | OUTPATIENT
Start: 2021-03-20 | End: 2021-03-23

## 2021-03-20 RX ORDER — SODIUM CHLORIDE 9 MG/ML
1000 INJECTION INTRAMUSCULAR; INTRAVENOUS; SUBCUTANEOUS
Refills: 0 | Status: DISCONTINUED | OUTPATIENT
Start: 2021-03-20 | End: 2021-03-21

## 2021-03-20 RX ORDER — OXYCODONE HYDROCHLORIDE 5 MG/1
5 TABLET ORAL EVERY 4 HOURS
Refills: 0 | Status: DISCONTINUED | OUTPATIENT
Start: 2021-03-20 | End: 2021-03-20

## 2021-03-20 RX ORDER — OXYCODONE HYDROCHLORIDE 5 MG/1
2.5 TABLET ORAL EVERY 4 HOURS
Refills: 0 | Status: DISCONTINUED | OUTPATIENT
Start: 2021-03-20 | End: 2021-03-20

## 2021-03-20 RX ORDER — ONDANSETRON 8 MG/1
4 TABLET, FILM COATED ORAL EVERY 6 HOURS
Refills: 0 | Status: DISCONTINUED | OUTPATIENT
Start: 2021-03-20 | End: 2021-03-23

## 2021-03-20 RX ORDER — TRAMADOL HYDROCHLORIDE 50 MG/1
50 TABLET ORAL EVERY 6 HOURS
Refills: 0 | Status: DISCONTINUED | OUTPATIENT
Start: 2021-03-20 | End: 2021-03-23

## 2021-03-20 RX ORDER — ENOXAPARIN SODIUM 100 MG/ML
40 INJECTION SUBCUTANEOUS EVERY 24 HOURS
Refills: 0 | Status: COMPLETED | OUTPATIENT
Start: 2021-03-20 | End: 2021-03-22

## 2021-03-20 RX ORDER — ACETAMINOPHEN 500 MG
975 TABLET ORAL EVERY 8 HOURS
Refills: 0 | Status: DISCONTINUED | OUTPATIENT
Start: 2021-03-20 | End: 2021-03-23

## 2021-03-20 RX ORDER — TRAMADOL HYDROCHLORIDE 50 MG/1
25 TABLET ORAL EVERY 6 HOURS
Refills: 0 | Status: DISCONTINUED | OUTPATIENT
Start: 2021-03-20 | End: 2021-03-23

## 2021-03-20 RX ORDER — CEFAZOLIN SODIUM 1 G
2000 VIAL (EA) INJECTION EVERY 8 HOURS
Refills: 0 | Status: COMPLETED | OUTPATIENT
Start: 2021-03-20 | End: 2021-03-21

## 2021-03-20 RX ORDER — MAGNESIUM HYDROXIDE 400 MG/1
30 TABLET, CHEWABLE ORAL DAILY
Refills: 0 | Status: DISCONTINUED | OUTPATIENT
Start: 2021-03-20 | End: 2021-03-23

## 2021-03-20 RX ORDER — LANOLIN ALCOHOL/MO/W.PET/CERES
3 CREAM (GRAM) TOPICAL AT BEDTIME
Refills: 0 | Status: DISCONTINUED | OUTPATIENT
Start: 2021-03-20 | End: 2021-03-23

## 2021-03-20 RX ORDER — SODIUM CHLORIDE 9 MG/ML
500 INJECTION INTRAMUSCULAR; INTRAVENOUS; SUBCUTANEOUS ONCE
Refills: 0 | Status: COMPLETED | OUTPATIENT
Start: 2021-03-20 | End: 2021-03-20

## 2021-03-20 RX ADMIN — OXYCODONE HYDROCHLORIDE 5 MILLIGRAM(S): 5 TABLET ORAL at 18:30

## 2021-03-20 RX ADMIN — SODIUM CHLORIDE 125 MILLILITER(S): 9 INJECTION INTRAMUSCULAR; INTRAVENOUS; SUBCUTANEOUS at 19:32

## 2021-03-20 RX ADMIN — SODIUM CHLORIDE 125 MILLILITER(S): 9 INJECTION INTRAMUSCULAR; INTRAVENOUS; SUBCUTANEOUS at 14:15

## 2021-03-20 RX ADMIN — Medication 400 MILLIGRAM(S): at 22:05

## 2021-03-20 RX ADMIN — SENNA PLUS 2 TABLET(S): 8.6 TABLET ORAL at 22:05

## 2021-03-20 RX ADMIN — PANTOPRAZOLE SODIUM 40 MILLIGRAM(S): 20 TABLET, DELAYED RELEASE ORAL at 06:56

## 2021-03-20 RX ADMIN — SODIUM CHLORIDE 500 MILLILITER(S): 9 INJECTION INTRAMUSCULAR; INTRAVENOUS; SUBCUTANEOUS at 19:32

## 2021-03-20 RX ADMIN — OXYCODONE HYDROCHLORIDE 5 MILLIGRAM(S): 5 TABLET ORAL at 19:20

## 2021-03-20 RX ADMIN — Medication 100 MILLIGRAM(S): at 20:25

## 2021-03-20 RX ADMIN — CHLORHEXIDINE GLUCONATE 1 APPLICATION(S): 213 SOLUTION TOPICAL at 06:56

## 2021-03-20 RX ADMIN — Medication 1 APPLICATION(S): at 06:56

## 2021-03-20 RX ADMIN — ENOXAPARIN SODIUM 40 MILLIGRAM(S): 100 INJECTION SUBCUTANEOUS at 18:22

## 2021-03-20 NOTE — PROGRESS NOTE ADULT - SUBJECTIVE AND OBJECTIVE BOX
Patient seen and evalauted this AM. Pain controlled.  Has been NPO since AM.  Ready for OR this AM.    ICU Vital Signs Last 24 Hrs  T(C): 36.7 (20 Mar 2021 07:45), Max: 36.7 (19 Mar 2021 12:57)  T(F): 98.1 (20 Mar 2021 07:45), Max: 98.1 (19 Mar 2021 12:57)  HR: 94 (20 Mar 2021 07:45) (88 - 109)  BP: 127/82 (20 Mar 2021 07:45) (121/72 - 162/89)  BP(mean): --  ABP: --  ABP(mean): --  RR: 16 (20 Mar 2021 07:45) (16 - 17)  SpO2: 99% (20 Mar 2021 07:45) (94% - 99%)        Exam:    PE  Gen: NAD, alert and oriented  Resp: Unlabored breathing  LLE: Skin intact, no ecchymosis,        SILT DP/SP/ Lisa/Saph/Post Tib       +EHL/FHL/TA/Gastroc,        Knee/ankle painless ROM,        hip ROM limited 2/2 pain,       DP+,        soft compartments, no calf ttp,        +log roll.      70yFemale with Left Femoral NeckFracture    - Plan for OR today  - Continue NPO  - IVF  - Pain control PRN

## 2021-03-21 DIAGNOSIS — Z29.9 ENCOUNTER FOR PROPHYLACTIC MEASURES, UNSPECIFIED: ICD-10-CM

## 2021-03-21 DIAGNOSIS — C34.90 MALIGNANT NEOPLASM OF UNSPECIFIED PART OF UNSPECIFIED BRONCHUS OR LUNG: ICD-10-CM

## 2021-03-21 DIAGNOSIS — J90 PLEURAL EFFUSION, NOT ELSEWHERE CLASSIFIED: ICD-10-CM

## 2021-03-21 LAB
ANION GAP SERPL CALC-SCNC: 10 MMOL/L — SIGNIFICANT CHANGE UP (ref 7–14)
BUN SERPL-MCNC: 14 MG/DL — SIGNIFICANT CHANGE UP (ref 7–23)
CALCIUM SERPL-MCNC: 9 MG/DL — SIGNIFICANT CHANGE UP (ref 8.4–10.5)
CHLORIDE SERPL-SCNC: 94 MMOL/L — LOW (ref 98–107)
CO2 SERPL-SCNC: 28 MMOL/L — SIGNIFICANT CHANGE UP (ref 22–31)
CREAT SERPL-MCNC: 0.6 MG/DL — SIGNIFICANT CHANGE UP (ref 0.5–1.3)
FERRITIN SERPL-MCNC: 1273 NG/ML — HIGH (ref 15–150)
GLUCOSE SERPL-MCNC: 104 MG/DL — HIGH (ref 70–99)
HCT VFR BLD CALC: 26.3 % — LOW (ref 34.5–45)
HGB BLD-MCNC: 8.8 G/DL — LOW (ref 11.5–15.5)
IRON SATN MFR SERPL: 23 % — SIGNIFICANT CHANGE UP (ref 14–50)
IRON SATN MFR SERPL: 41 UG/DL — SIGNIFICANT CHANGE UP (ref 30–160)
MCHC RBC-ENTMCNC: 31.8 PG — SIGNIFICANT CHANGE UP (ref 27–34)
MCHC RBC-ENTMCNC: 33.5 GM/DL — SIGNIFICANT CHANGE UP (ref 32–36)
MCV RBC AUTO: 94.9 FL — SIGNIFICANT CHANGE UP (ref 80–100)
NRBC # BLD: 0 /100 WBCS — SIGNIFICANT CHANGE UP
NRBC # FLD: 0 K/UL — SIGNIFICANT CHANGE UP
PLATELET # BLD AUTO: 189 K/UL — SIGNIFICANT CHANGE UP (ref 150–400)
POTASSIUM SERPL-MCNC: 4.1 MMOL/L — SIGNIFICANT CHANGE UP (ref 3.5–5.3)
POTASSIUM SERPL-SCNC: 4.1 MMOL/L — SIGNIFICANT CHANGE UP (ref 3.5–5.3)
RBC # BLD: 2.77 M/UL — LOW (ref 3.8–5.2)
RBC # FLD: 15.4 % — HIGH (ref 10.3–14.5)
SODIUM SERPL-SCNC: 132 MMOL/L — LOW (ref 135–145)
TIBC SERPL-MCNC: 175 UG/DL — LOW (ref 220–430)
UIBC SERPL-MCNC: 134 UG/DL — SIGNIFICANT CHANGE UP (ref 110–370)
WBC # BLD: 8.73 K/UL — SIGNIFICANT CHANGE UP (ref 3.8–10.5)
WBC # FLD AUTO: 8.73 K/UL — SIGNIFICANT CHANGE UP (ref 3.8–10.5)

## 2021-03-21 PROCEDURE — 99233 SBSQ HOSP IP/OBS HIGH 50: CPT

## 2021-03-21 PROCEDURE — 71045 X-RAY EXAM CHEST 1 VIEW: CPT | Mod: 26

## 2021-03-21 RX ORDER — ACETAMINOPHEN 500 MG
1000 TABLET ORAL ONCE
Refills: 0 | Status: COMPLETED | OUTPATIENT
Start: 2021-03-21 | End: 2021-03-21

## 2021-03-21 RX ADMIN — POLYETHYLENE GLYCOL 3350 17 GRAM(S): 17 POWDER, FOR SOLUTION ORAL at 13:23

## 2021-03-21 RX ADMIN — Medication 975 MILLIGRAM(S): at 13:23

## 2021-03-21 RX ADMIN — Medication 975 MILLIGRAM(S): at 22:01

## 2021-03-21 RX ADMIN — ONDANSETRON 4 MILLIGRAM(S): 8 TABLET, FILM COATED ORAL at 05:15

## 2021-03-21 RX ADMIN — TRAMADOL HYDROCHLORIDE 50 MILLIGRAM(S): 50 TABLET ORAL at 14:20

## 2021-03-21 RX ADMIN — TRAMADOL HYDROCHLORIDE 50 MILLIGRAM(S): 50 TABLET ORAL at 00:18

## 2021-03-21 RX ADMIN — ONDANSETRON 4 MILLIGRAM(S): 8 TABLET, FILM COATED ORAL at 22:06

## 2021-03-21 RX ADMIN — PANTOPRAZOLE SODIUM 40 MILLIGRAM(S): 20 TABLET, DELAYED RELEASE ORAL at 08:11

## 2021-03-21 RX ADMIN — Medication 975 MILLIGRAM(S): at 23:00

## 2021-03-21 RX ADMIN — ENOXAPARIN SODIUM 40 MILLIGRAM(S): 100 INJECTION SUBCUTANEOUS at 17:52

## 2021-03-21 RX ADMIN — TRAMADOL HYDROCHLORIDE 50 MILLIGRAM(S): 50 TABLET ORAL at 20:02

## 2021-03-21 RX ADMIN — TRAMADOL HYDROCHLORIDE 50 MILLIGRAM(S): 50 TABLET ORAL at 13:23

## 2021-03-21 RX ADMIN — TRAMADOL HYDROCHLORIDE 50 MILLIGRAM(S): 50 TABLET ORAL at 01:00

## 2021-03-21 RX ADMIN — Medication 400 MILLIGRAM(S): at 05:15

## 2021-03-21 RX ADMIN — Medication 3 MILLIGRAM(S): at 00:18

## 2021-03-21 RX ADMIN — Medication 100 MILLIGRAM(S): at 06:47

## 2021-03-21 NOTE — CONSULT NOTE ADULT - SUBJECTIVE AND OBJECTIVE BOX
CHIEF COMPLAINT:    HPI: Patient is a 71 yo F w/ OA of b/l knees, gout, diverticulosis who initially presented to [facility] on 3/13/21 with difficulty ambulating w/ complaints of worsening b/l hip pain, and R shoulder pain. Patient also endorsed FRYE, nausea, and unintentional weight loss. CT chest showed poorly defined RUL mass extending into R hilum and mediastinum with occlusion of RUL airways as well as moderate to large R pleural effusion; bilateral adrenal nodularity, likely metastatic. Patient underwent R thoracentesis on 3/18, removed 1L with cytology positive for adenocarcinoma. Patient was transferred to [facility] on 3/19/21 for fixation of R femoral neck fracture in the setting of numerous lytic bone lesions suspicious for metastatic disease. Patient underwent R hip hemiarthroplasty on 3/20/21    Labs notable for Hb 8.8, decreased since last week from 13. TTE showed normal EF w/ moderate pHTN. CXR today with increased R pleural effusion. Pulmonary consulted for evaluation.       PAST MEDICAL & SURGICAL HISTORY:  Diverticulosis    Gout    OA (osteoarthritis)    H/O total hysterectomy        FAMILY HISTORY:      SOCIAL HISTORY:  Smoking: [ ] Never Smoked [ ] Former Smoker (__ packs x ___ years) [ ] Current Smoker  (__ packs x ___ years)  Substance Use: [ ] Never Used [ ] Used ____  EtOH Use:  Marital Status: [ ] Single [ ]  [ ]  [ ]   Sexual History:   Occupation:  Recent Travel:  Country of Birth:  Advance Directives:    Allergies    moxifloxacin (Anaphylaxis (Moderate))    Intolerances    dairy products (Stomach Upset)  lactose (Unknown)  shellfish (Stomach Upset)      HOME MEDICATIONS:  Home Medications:      REVIEW OF SYSTEMS:  Constitutional: [ ] negative [ ] fevers [ ] chills [ ] weight loss [ ] weight gain  HEENT: [ ] negative [ ] dry eyes [ ] eye irritation [ ] postnasal drip [ ] nasal congestion  CV: [ ] negative  [ ] chest pain [ ] orthopnea [ ] palpitations [ ] murmur  Resp: [ ] negative [ ] cough [ ] shortness of breath [ ] dyspnea [ ] wheezing [ ] sputum [ ] hemoptysis  GI: [ ] negative [ ] nausea [ ] vomiting [ ] diarrhea [ ] constipation [ ] abd pain [ ] dysphagia   : [ ] negative [ ] dysuria [ ] nocturia [ ] hematuria [ ] increased urinary frequency  Musculoskeletal: [ ] negative [ ] back pain [ ] myalgias [ ] arthralgias [ ] fracture  Skin: [ ] negative [ ] rash [ ] itch  Neurological: [ ] negative [ ] headache [ ] dizziness [ ] syncope [ ] weakness [ ] numbness  Psychiatric: [ ] negative [ ] anxiety [ ] depression  Endocrine: [ ] negative [ ] diabetes [ ] thyroid problem  Hematologic/Lymphatic: [ ] negative [ ] anemia [ ] bleeding problem  Allergic/Immunologic: [ ] negative [ ] itchy eyes [ ] nasal discharge [ ] hives [ ] angioedema  [ ] All other systems negative  [ ] Unable to assess ROS because ________    OBJECTIVE:  ICU Vital Signs Last 24 Hrs  T(C): 36.6 (21 Mar 2021 13:16), Max: 36.9 (20 Mar 2021 21:39)  T(F): 97.9 (21 Mar 2021 13:16), Max: 98.5 (20 Mar 2021 21:39)  HR: 110 (21 Mar 2021 13:16) (88 - 115)  BP: 119/89 (21 Mar 2021 13:16) (88/68 - 124/76)  BP(mean): 74 (20 Mar 2021 16:00) (73 - 102)  ABP: --  ABP(mean): --  RR: 21 (21 Mar 2021 13:16) (16 - 21)  SpO2: 94% (21 Mar 2021 13:16) (93% - 99%)        03-20 @ 07:01  -  03-21 @ 07:00  --------------------------------------------------------  IN: 0 mL / OUT: 475 mL / NET: -475 mL    03-21 @ 07:01  -  03-21 @ 15:13  --------------------------------------------------------  IN: 1095 mL / OUT: 650 mL / NET: 445 mL      CAPILLARY BLOOD GLUCOSE          PHYSICAL EXAM:  General:   HEENT:   Lymph Nodes:  Neck:   Respiratory:   Cardiovascular:   Abdomen:   Extremities:   Skin:   Neurological:  Psychiatry:    LINES:     HOSPITAL MEDICATIONS:  Standing Meds:  acetaminophen   Tablet .. 975 milliGRAM(s) Oral every 8 hours  enoxaparin Injectable 40 milliGRAM(s) SubCutaneous every 24 hours  pantoprazole    Tablet 40 milliGRAM(s) Oral before breakfast  polyethylene glycol 3350 17 Gram(s) Oral daily  senna 2 Tablet(s) Oral at bedtime      PRN Meds:  magnesium hydroxide Suspension 30 milliLiter(s) Oral daily PRN  melatonin 3 milliGRAM(s) Oral at bedtime PRN  ondansetron Injectable 4 milliGRAM(s) IV Push every 6 hours PRN  traMADol 50 milliGRAM(s) Oral every 6 hours PRN  traMADol 25 milliGRAM(s) Oral every 6 hours PRN      LABS:                        8.8    8.73  )-----------( 189      ( 21 Mar 2021 07:28 )             26.3     Hgb Trend: 8.8<--, 10.1<--, 9.5<--, 10.2<--, 10.3<--  03-21    132<L>  |  94<L>  |  14  ----------------------------<  104<H>  4.1   |  28  |  0.60    Ca    9.0      21 Mar 2021 07:28    TPro  x   /  Alb  2.9<L>  /  TBili  x   /  DBili  x   /  AST  x   /  ALT  x   /  AlkPhos  x   03-20    Creatinine Trend: 0.60<--, 0.49<--, 0.63<--, 0.57<--, 0.6<--, 0.6<--  PT/INR - ( 20 Mar 2021 05:03 )   PT: 13.6 sec;   INR: 1.20 ratio         PTT - ( 20 Mar 2021 05:03 )  PTT:25.1 sec          MICROBIOLOGY:       RADIOLOGY:  [ ] Reviewed and interpreted by me    PULMONARY FUNCTION TESTS:    EKG:   CHIEF COMPLAINT:    HPI: Patient is a 71 yo F w/ OA of b/l knees, gout, diverticulosis who initially presented to [facility] on 3/13/21 with difficulty ambulating w/ complaints of worsening b/l hip pain, and R shoulder pain. Patient also endorsed FRYE, nausea, and unintentional weight loss. CT chest showed poorly defined RUL mass extending into R hilum and mediastinum with occlusion of RUL airways as well as moderate to large R pleural effusion; bilateral adrenal nodularity, likely metastatic. Patient underwent R thoracentesis on 3/18, removed 1L with cytology positive for adenocarcinoma. Patient was transferred to [facility] on 3/19/21 for fixation of R femoral neck fracture in the setting of numerous lytic bone lesions suspicious for metastatic disease. Patient underwent R hip hemiarthroplasty on 3/20/21    Labs notable for Hb 8.8, decreased since last week from 13. TTE showed normal EF w/ moderate pHTN. CXR today with increased R pleural effusion. Pulmonary consulted for evaluation. Patient currently endorses no respiratory distress. Endorses that she still had much respiratory relief after therapeutic thoracentesis on 3/18 and currently still feels very well compared to prior the thoracentesis. Endorses intermittent SOB at night time the last 2 nights. Denies any fevers, chills, cough, hemopytsis, CP.       PAST MEDICAL & SURGICAL HISTORY:  Diverticulosis    Gout    OA (osteoarthritis)    H/O total hysterectomy        FAMILY HISTORY:      SOCIAL HISTORY:  Smoking: [ ] Never Smoked [X] Former Smoker (0.5 packs x 25 years) [ ] Current Smoker  (__ packs x ___ years)  Substance Use: [X] Never Used [ ] Used ____  EtOH Use: None  Marital Status: [ ] Single [ ]  [ ]  [ ]   Sexual History:   Occupation: Retired therapist  Recent Travel:   Country of Birth:  Advance Directives:    Allergies    moxifloxacin (Anaphylaxis (Moderate))    Intolerances    dairy products (Stomach Upset)  lactose (Unknown)  shellfish (Stomach Upset)      HOME MEDICATIONS:  Home Medications:      REVIEW OF SYSTEMS:  Constitutional: [ ] negative [ ] fevers [ ] chills [ ] weight loss [ ] weight gain  HEENT: [ ] negative [ ] dry eyes [ ] eye irritation [ ] postnasal drip [ ] nasal congestion  CV: [ ] negative  [ ] chest pain [ ] orthopnea [ ] palpitations [ ] murmur  Resp: [ ] negative [ ] cough [X] shortness of breath [ ] dyspnea [ ] wheezing [ ] sputum [ ] hemoptysis  GI: [ ] negative [ ] nausea [ ] vomiting [ ] diarrhea [ ] constipation [ ] abd pain [ ] dysphagia   : [ ] negative [ ] dysuria [ ] nocturia [ ] hematuria [ ] increased urinary frequency  Musculoskeletal: [ ] negative [X] back pain [ ] myalgias [ ] arthralgias [ ] fracture  Skin: [ ] negative [ ] rash [ ] itch  Neurological: [ ] negative [ ] headache [ ] dizziness [ ] syncope [ ] weakness [ ] numbness  Psychiatric: [ ] negative [ ] anxiety [ ] depression  Endocrine: [ ] negative [ ] diabetes [ ] thyroid problem  Hematologic/Lymphatic: [ ] negative [ ] anemia [ ] bleeding problem  Allergic/Immunologic: [ ] negative [ ] itchy eyes [ ] nasal discharge [ ] hives [ ] angioedema  [X] All other systems negative  [ ] Unable to assess ROS because ________    OBJECTIVE:  ICU Vital Signs Last 24 Hrs  T(C): 36.6 (21 Mar 2021 13:16), Max: 36.9 (20 Mar 2021 21:39)  T(F): 97.9 (21 Mar 2021 13:16), Max: 98.5 (20 Mar 2021 21:39)  HR: 110 (21 Mar 2021 13:16) (88 - 115)  BP: 119/89 (21 Mar 2021 13:16) (88/68 - 124/76)  BP(mean): 74 (20 Mar 2021 16:00) (73 - 102)  ABP: --  ABP(mean): --  RR: 21 (21 Mar 2021 13:16) (16 - 21)  SpO2: 94% (21 Mar 2021 13:16) (93% - 99%)        03-20 @ 07:01  -  03-21 @ 07:00  --------------------------------------------------------  IN: 0 mL / OUT: 475 mL / NET: -475 mL    03-21 @ 07:01  -  03-21 @ 15:13  --------------------------------------------------------  IN: 1095 mL / OUT: 650 mL / NET: 445 mL      CAPILLARY BLOOD GLUCOSE          PHYSICAL EXAM:  General: NAD, resting comfortably  HEENT: EOMI, PERRLA  Respiratory: CTAB, decreased breath sounds at R base  Cardiovascular: S1S2, RRR  Abdomen: Soft, NTND, BS+  Extremities: No edema    LINES:     HOSPITAL MEDICATIONS:  Standing Meds:  acetaminophen   Tablet .. 975 milliGRAM(s) Oral every 8 hours  enoxaparin Injectable 40 milliGRAM(s) SubCutaneous every 24 hours  pantoprazole    Tablet 40 milliGRAM(s) Oral before breakfast  polyethylene glycol 3350 17 Gram(s) Oral daily  senna 2 Tablet(s) Oral at bedtime      PRN Meds:  magnesium hydroxide Suspension 30 milliLiter(s) Oral daily PRN  melatonin 3 milliGRAM(s) Oral at bedtime PRN  ondansetron Injectable 4 milliGRAM(s) IV Push every 6 hours PRN  traMADol 50 milliGRAM(s) Oral every 6 hours PRN  traMADol 25 milliGRAM(s) Oral every 6 hours PRN      LABS:                        8.8    8.73  )-----------( 189      ( 21 Mar 2021 07:28 )             26.3     Hgb Trend: 8.8<--, 10.1<--, 9.5<--, 10.2<--, 10.3<--  03-21    132<L>  |  94<L>  |  14  ----------------------------<  104<H>  4.1   |  28  |  0.60    Ca    9.0      21 Mar 2021 07:28    TPro  x   /  Alb  2.9<L>  /  TBili  x   /  DBili  x   /  AST  x   /  ALT  x   /  AlkPhos  x   03-20    Creatinine Trend: 0.60<--, 0.49<--, 0.63<--, 0.57<--, 0.6<--, 0.6<--  PT/INR - ( 20 Mar 2021 05:03 )   PT: 13.6 sec;   INR: 1.20 ratio         PTT - ( 20 Mar 2021 05:03 )  PTT:25.1 sec          MICROBIOLOGY:       RADIOLOGY:  [ ] Reviewed and interpreted by me    PULMONARY FUNCTION TESTS:    EKG:

## 2021-03-21 NOTE — CONSULT NOTE ADULT - ATTENDING COMMENTS
70F hx OA, gout, diverticulosis p/w hip pain found to have hip fracture s/p R hip hemoarthroplasty on 3/20. Prior to admission pt had some FRYE and CT chest showed RUL mass extending into R hilum/mediastinum and R pleff. Thoracentesis done at [facility] on 3/15 with cyto positive for adenoCA. Pulmonary consulted for R pleural effusion. Pt currently requiring O2 and has some SOB. Us shows moderate sized effusion on right, suggesting reaccumulation of her malignant pleff. Pt does not have significant respiratory symptoms now but prior to first thora did not complain about SOB but did feel relief when she had fluid removed. Given recurrence of malignant pleff, could benefit from intrapleural catheter for drainage. Discussed potentially being able to do this while she is admitted. Will try to schedule this week. Please ensure that she has up to date coags and CBC.

## 2021-03-21 NOTE — OCCUPATIONAL THERAPY INITIAL EVALUATION ADULT - PRECAUTIONS/LIMITATIONS, REHAB EVAL
O2 4L nasal cannula/aspiration precautions/cardiac precautions/fall precautions/left hip precautions/oxygen therapy device and L/min/spinal precautions/surgical precautions

## 2021-03-21 NOTE — OCCUPATIONAL THERAPY INITIAL EVALUATION ADULT - PERTINENT HX OF CURRENT PROBLEM, REHAB EVAL
69 yo female transferred from [facility] for right hip hemiarthroplasty with Dr Lord. Pt last walked independently Nov/Dec 2020 but has had progressive back pain and now walks with walker. Pt. presented to [facility] on 3/13/21 where XR reveal non displaced Fnfx, R scapula osteolytic lesion, L glenoid fracture, T11 compression deformity.  Pt S/P thoracoscentesis, positive for malignant cells.  BS reveals metastatic disease to ribs, bilat Ilium, R SI joint, bilat IT ridges. TTE 3/16-EF 53%

## 2021-03-21 NOTE — PROGRESS NOTE ADULT - SUBJECTIVE AND OBJECTIVE BOX
Pt seen/examined. Doing well. Reports right shoulder pain and nausea this morning.     T(C): 36.5 (03-21-21 @ 05:13), Max: 36.9 (03-20-21 @ 21:39)  HR: 103 (03-21-21 @ 05:13) (88 - 115)  BP: 124/76 (03-21-21 @ 05:13) (88/68 - 124/76)  RR: 16 (03-21-21 @ 05:13) (16 - 22)  SpO2: 96% (03-21-21 @ 01:22) (95% - 100%)  Wt(kg): --    Gen: awake, alert, NAD  Resp: no increased work of breathing  RLE:  dressing c/d/i  +EHL/FHL/TA/GS  SILT S/S/SP/DP  +DP/PT Pulses  Compartments soft  No calf TTP     70yFemale s/p left hip oluis    - Pain control  - mechanical/DVT ppx  - OOB/PT  - WBAT  - FU AM labs  - Dispo planning

## 2021-03-21 NOTE — PHYSICAL THERAPY INITIAL EVALUATION ADULT - PATIENT PROFILE REVIEW, REHAB EVAL
PT orders received: No formal activity order. Consult with RN Tito LOYA, patient may participate in PT evaluation./yes

## 2021-03-21 NOTE — PROGRESS NOTE ADULT - SUBJECTIVE AND OBJECTIVE BOX
CHIEF COMPLAINT: f/u     SUBJECTIVE / OVERNIGHT EVENTS: Patient seen and examined. No acute events overnight. Patient is very unsure about the timing of her issues, wants to speak with sister but she didnt answer her phone. States her breathing is more labored and she is worried about it, states that she doesn't want to have fluid taken out but she will if she has to. Denies chest pain. States her hip does not hurt but her back does hurt. She is voiding okay.    MEDICATIONS  (STANDING):  acetaminophen   Tablet .. 975 milliGRAM(s) Oral every 8 hours  enoxaparin Injectable 40 milliGRAM(s) SubCutaneous every 24 hours  pantoprazole    Tablet 40 milliGRAM(s) Oral before breakfast  polyethylene glycol 3350 17 Gram(s) Oral daily  senna 2 Tablet(s) Oral at bedtime    MEDICATIONS  (PRN):  magnesium hydroxide Suspension 30 milliLiter(s) Oral daily PRN Constipation  melatonin 3 milliGRAM(s) Oral at bedtime PRN Insomnia  ondansetron Injectable 4 milliGRAM(s) IV Push every 6 hours PRN Nausea and/or Vomiting  traMADol 50 milliGRAM(s) Oral every 6 hours PRN Severe Pain (7 - 10)  traMADol 25 milliGRAM(s) Oral every 6 hours PRN Moderate Pain (1 - 6)      VITALS:  T(F): 97.9 (03-21-21 @ 13:16), Max: 98.5 (03-20-21 @ 21:39)  HR: 110 (03-21-21 @ 13:16) (88 - 115)  BP: 119/89 (03-21-21 @ 13:16) (95/61 - 124/76)  RR: 21 (03-21-21 @ 13:16) (16 - 21)  SpO2: 94% (03-21-21 @ 13:16)  Wt(kg): --      PHYSICAL EXAM:  GENERAL: mild respiratory distress   CHEST/LUNG: Decreased breath sounds R base   HEART: Regular rate and rhythm; No murmurs, rubs, or gallops  ABDOMEN: Soft, Nontender, Nondistended; Bowel sounds present  EXTREMITIES:  2+ Peripheral Pulses, No clubbing, cyanosis, or edema  MSK: RLE sensation intact, able to wiggle toes, compartments soft, plantar and dorsiflexon intact.   PSYCH: AAOx3  NEUROLOGY: non-focal  SKIN: No rashes or lesions    LABS:              8.8                  132  | 28   | 14           8.73  >-----------< 189     ------------------------< 104                   26.3                 4.1  | 94   | 0.60                                         Ca 9.0   Mg x     Ph x           TPro  x   /  Alb  2.9      TBili  x   /  DBili  x         AST  x   /  ALT  x             AlkPhos  x       INR: 1.20 ratio<H>;    PT: 13.6 sec;    PTT: 25.1 sec<L>            RADIOLOGY & ADDITIONAL TESTS:  Imaging Personally Reviewed: [x] Yes  CXR:   IMPRESSION:  Right lung mass with increasing pleural effusion.    [ ] Consultant(s) Notes Reviewed:  [x] Care Discussed with Consultants/Other Providers: Orthopedic PA - discussed

## 2021-03-21 NOTE — PHYSICAL THERAPY INITIAL EVALUATION ADULT - RANGE OF MOTION EXAMINATION, REHAB EVAL
within right hip precautions/bilateral upper extremity ROM was WFL (within functional limits)/bilateral lower extremity ROM was WFL (within functional limits)

## 2021-03-21 NOTE — CONSULT NOTE ADULT - ASSESSMENT
Patient is a 69 yo F w/ OA of b/l knees, gout, diverticulosis who initially presented to [facility] on 3/13/21 with difficulty ambulating w/ complaints of worsening b/l hip pain, and R shoulder pain. CT chest showed poorly defined RUL mass extending into R hilum and mediastinum with occlusion of RUL airways as well as moderate to large R pleural effusion; bilateral adrenal nodularity, likely metastatic. Patient underwent R thoracentesis on 3/18, removed 1L with cytology positive for adenocarcinoma. Patient underwent R hip hemiarthroplasty on 3/20/21 for fixation of R femoral neck fracture in the setting of numerous lytic bone lesions suspicious for metastatic disease. Pulmonary called for enlarging R pleural effusion.

## 2021-03-21 NOTE — CONSULT NOTE ADULT - PROBLEM SELECTOR RECOMMENDATION 9
-Prior to worsening metastatic disease, even as far back as December 2020, patient was walking 20-30 minutes a day. She denies any CP or SOB until this recent admission when she was found to have pulmonary adenocarcinoma  -No history of CAD or CVA; TTE without wall motion abnormalities and normal LV systolic function  -RCRI of 0, denoting Class I risk with 3.9% 30 day risk of death, MI, or cardiac arrest   -No further work-up needed; patient is currently medically optimized for surgery
Enlarging R malignant effusion. Currently no respiratory distress, saturating well on 2L nasal cannula. POCUS w/ moderate R pleural effusion.  - Will try to schedule for PleurX placement given quickly reaccumulating malignant pleural effusion  - If decompensates beforehand can perform more urgent thoracentesis.    Kenyon Edmond MD  Pulmonary & Critical Care Fellow  [phone]

## 2021-03-21 NOTE — PHYSICAL THERAPY INITIAL EVALUATION ADULT - ADDITIONAL COMMENTS
Pt lives in a house with 9 exterior steps to enter + additional 16 steps within home to bedroom/bathroom.

## 2021-03-21 NOTE — PHYSICAL THERAPY INITIAL EVALUATION ADULT - IMPAIRMENTS CONTRIBUTING IMPAIRED BED MOBILITY, REHAB EVAL
fearful. SpO2 86% on supplemental oxygen during activity, improved to 91% after 2 minutes of rest./impaired balance/impaired postural control/decreased strength

## 2021-03-21 NOTE — PHYSICAL THERAPY INITIAL EVALUATION ADULT - PERTINENT HX OF CURRENT PROBLEM, REHAB EVAL
Pt is a 70 year old female presenting with a pre-operative diagnosis of right femoral neck fracture. Pt POD#1 s/p left hip hemiarthroplasty. PMH: gout, diverticulosis.

## 2021-03-21 NOTE — PHYSICAL THERAPY INITIAL EVALUATION ADULT - GENERAL OBSERVATIONS, REHAB EVAL
Pt received semisupine in bed, +primafit, +4L oxygen via nasal cannula, +pulse oximeter, in NAD. Pt agreeable to participate in PT evaluation.

## 2021-03-22 ENCOUNTER — TRANSCRIPTION ENCOUNTER (OUTPATIENT)
Age: 71
End: 2021-03-22

## 2021-03-22 DIAGNOSIS — M54.9 DORSALGIA, UNSPECIFIED: ICD-10-CM

## 2021-03-22 LAB
ANION GAP SERPL CALC-SCNC: 10 MMOL/L — SIGNIFICANT CHANGE UP (ref 7–14)
APTT BLD: 27.6 SEC — SIGNIFICANT CHANGE UP (ref 27–36.3)
BUN SERPL-MCNC: 14 MG/DL — SIGNIFICANT CHANGE UP (ref 7–23)
CALCIUM SERPL-MCNC: 9.3 MG/DL — SIGNIFICANT CHANGE UP (ref 8.4–10.5)
CHLORIDE SERPL-SCNC: 92 MMOL/L — LOW (ref 98–107)
CO2 SERPL-SCNC: 28 MMOL/L — SIGNIFICANT CHANGE UP (ref 22–31)
CREAT SERPL-MCNC: 0.45 MG/DL — LOW (ref 0.5–1.3)
GLUCOSE SERPL-MCNC: 90 MG/DL — SIGNIFICANT CHANGE UP (ref 70–99)
HCT VFR BLD CALC: 24.5 % — LOW (ref 34.5–45)
HGB BLD-MCNC: 8.2 G/DL — LOW (ref 11.5–15.5)
INR BLD: 1.28 RATIO — HIGH (ref 0.88–1.16)
MCHC RBC-ENTMCNC: 30.7 PG — SIGNIFICANT CHANGE UP (ref 27–34)
MCHC RBC-ENTMCNC: 33.5 GM/DL — SIGNIFICANT CHANGE UP (ref 32–36)
MCV RBC AUTO: 91.8 FL — SIGNIFICANT CHANGE UP (ref 80–100)
NRBC # BLD: 0 /100 WBCS — SIGNIFICANT CHANGE UP
NRBC # FLD: 0 K/UL — SIGNIFICANT CHANGE UP
OSMOLALITY UR: 971 MOSM/KG — SIGNIFICANT CHANGE UP (ref 50–1200)
PLATELET # BLD AUTO: 197 K/UL — SIGNIFICANT CHANGE UP (ref 150–400)
POTASSIUM SERPL-MCNC: 4.4 MMOL/L — SIGNIFICANT CHANGE UP (ref 3.5–5.3)
POTASSIUM SERPL-SCNC: 4.4 MMOL/L — SIGNIFICANT CHANGE UP (ref 3.5–5.3)
PROTHROM AB SERPL-ACNC: 14.6 SEC — HIGH (ref 10.6–13.6)
RBC # BLD: 2.67 M/UL — LOW (ref 3.8–5.2)
RBC # FLD: 15 % — HIGH (ref 10.3–14.5)
SARS-COV-2 RNA SPEC QL NAA+PROBE: SIGNIFICANT CHANGE UP
SODIUM SERPL-SCNC: 130 MMOL/L — LOW (ref 135–145)
SODIUM UR-SCNC: 37 MMOL/L — SIGNIFICANT CHANGE UP
WBC # BLD: 11.56 K/UL — HIGH (ref 3.8–10.5)
WBC # FLD AUTO: 11.56 K/UL — HIGH (ref 3.8–10.5)

## 2021-03-22 PROCEDURE — 99233 SBSQ HOSP IP/OBS HIGH 50: CPT | Mod: GC

## 2021-03-22 PROCEDURE — 99233 SBSQ HOSP IP/OBS HIGH 50: CPT

## 2021-03-22 RX ORDER — PANTOPRAZOLE SODIUM 20 MG/1
1 TABLET, DELAYED RELEASE ORAL
Qty: 0 | Refills: 0 | DISCHARGE
Start: 2021-03-22

## 2021-03-22 RX ORDER — ACETAMINOPHEN 500 MG
3 TABLET ORAL
Qty: 0 | Refills: 0 | DISCHARGE
Start: 2021-03-22

## 2021-03-22 RX ORDER — ZINC SULFATE TAB 220 MG (50 MG ZINC EQUIVALENT) 220 (50 ZN) MG
1 TAB ORAL
Qty: 0 | Refills: 0 | DISCHARGE
Start: 2021-03-22

## 2021-03-22 RX ORDER — ASCORBIC ACID 60 MG
500 TABLET,CHEWABLE ORAL DAILY
Refills: 0 | Status: DISCONTINUED | OUTPATIENT
Start: 2021-03-22 | End: 2021-03-23

## 2021-03-22 RX ORDER — HYDROMORPHONE HYDROCHLORIDE 2 MG/ML
0.25 INJECTION INTRAMUSCULAR; INTRAVENOUS; SUBCUTANEOUS ONCE
Refills: 0 | Status: DISCONTINUED | OUTPATIENT
Start: 2021-03-22 | End: 2021-03-22

## 2021-03-22 RX ORDER — LANOLIN ALCOHOL/MO/W.PET/CERES
1 CREAM (GRAM) TOPICAL
Qty: 0 | Refills: 0 | DISCHARGE
Start: 2021-03-22

## 2021-03-22 RX ORDER — ASCORBIC ACID 60 MG
1 TABLET,CHEWABLE ORAL
Qty: 0 | Refills: 0 | DISCHARGE
Start: 2021-03-22

## 2021-03-22 RX ORDER — ZINC SULFATE TAB 220 MG (50 MG ZINC EQUIVALENT) 220 (50 ZN) MG
220 TAB ORAL DAILY
Refills: 0 | Status: DISCONTINUED | OUTPATIENT
Start: 2021-03-22 | End: 2021-03-23

## 2021-03-22 RX ORDER — TRAMADOL HYDROCHLORIDE 50 MG/1
1 TABLET ORAL
Qty: 0 | Refills: 0 | DISCHARGE
Start: 2021-03-22

## 2021-03-22 RX ORDER — ENOXAPARIN SODIUM 100 MG/ML
40 INJECTION SUBCUTANEOUS
Qty: 0 | Refills: 0 | DISCHARGE
Start: 2021-03-22

## 2021-03-22 RX ORDER — TRAMADOL HYDROCHLORIDE 50 MG/1
0.5 TABLET ORAL
Qty: 0 | Refills: 0 | DISCHARGE
Start: 2021-03-22

## 2021-03-22 RX ORDER — POLYETHYLENE GLYCOL 3350 17 G/17G
17 POWDER, FOR SOLUTION ORAL
Qty: 0 | Refills: 0 | DISCHARGE
Start: 2021-03-22

## 2021-03-22 RX ORDER — SENNA PLUS 8.6 MG/1
2 TABLET ORAL
Qty: 0 | Refills: 0 | DISCHARGE
Start: 2021-03-22

## 2021-03-22 RX ORDER — MAGNESIUM HYDROXIDE 400 MG/1
30 TABLET, CHEWABLE ORAL
Qty: 0 | Refills: 0 | DISCHARGE
Start: 2021-03-22

## 2021-03-22 RX ADMIN — TRAMADOL HYDROCHLORIDE 50 MILLIGRAM(S): 50 TABLET ORAL at 11:53

## 2021-03-22 RX ADMIN — Medication 975 MILLIGRAM(S): at 22:10

## 2021-03-22 RX ADMIN — PANTOPRAZOLE SODIUM 40 MILLIGRAM(S): 20 TABLET, DELAYED RELEASE ORAL at 07:21

## 2021-03-22 RX ADMIN — ENOXAPARIN SODIUM 40 MILLIGRAM(S): 100 INJECTION SUBCUTANEOUS at 18:07

## 2021-03-22 RX ADMIN — Medication 975 MILLIGRAM(S): at 07:21

## 2021-03-22 RX ADMIN — ONDANSETRON 4 MILLIGRAM(S): 8 TABLET, FILM COATED ORAL at 07:58

## 2021-03-22 RX ADMIN — HYDROMORPHONE HYDROCHLORIDE 0.25 MILLIGRAM(S): 2 INJECTION INTRAMUSCULAR; INTRAVENOUS; SUBCUTANEOUS at 19:16

## 2021-03-22 RX ADMIN — Medication 975 MILLIGRAM(S): at 07:20

## 2021-03-22 RX ADMIN — Medication 1 TABLET(S): at 18:07

## 2021-03-22 RX ADMIN — Medication 500 MILLIGRAM(S): at 18:07

## 2021-03-22 RX ADMIN — Medication 975 MILLIGRAM(S): at 13:18

## 2021-03-22 RX ADMIN — TRAMADOL HYDROCHLORIDE 50 MILLIGRAM(S): 50 TABLET ORAL at 12:39

## 2021-03-22 RX ADMIN — SENNA PLUS 2 TABLET(S): 8.6 TABLET ORAL at 22:10

## 2021-03-22 NOTE — PROGRESS NOTE ADULT - SUBJECTIVE AND OBJECTIVE BOX
CHIEF COMPLAINT: f/u     SUBJECTIVE / OVERNIGHT EVENTS: Patient seen and examined. No acute events overnight. Patient endorsing back pain but denies hip pain. She states her breathing is "labored", denies cough. States she is not eating much. Has not had a bowel movement for several days.     MEDICATIONS  (STANDING):  acetaminophen   Tablet .. 975 milliGRAM(s) Oral every 8 hours  ascorbic acid 500 milliGRAM(s) Oral daily  enoxaparin Injectable 40 milliGRAM(s) SubCutaneous every 24 hours  multivitamin 1 Tablet(s) Oral daily  pantoprazole    Tablet 40 milliGRAM(s) Oral before breakfast  polyethylene glycol 3350 17 Gram(s) Oral daily  senna 2 Tablet(s) Oral at bedtime  zinc sulfate 220 milliGRAM(s) Oral daily    MEDICATIONS  (PRN):  aluminum hydroxide/magnesium hydroxide/simethicone Suspension 30 milliLiter(s) Oral every 6 hours PRN Dyspepsia  bisacodyl Suppository 10 milliGRAM(s) Rectal daily PRN If no bowel movement  magnesium hydroxide Suspension 30 milliLiter(s) Oral daily PRN Constipation  melatonin 3 milliGRAM(s) Oral at bedtime PRN Insomnia  ondansetron Injectable 4 milliGRAM(s) IV Push every 6 hours PRN Nausea and/or Vomiting  traMADol 50 milliGRAM(s) Oral every 6 hours PRN Severe Pain (7 - 10)  traMADol 25 milliGRAM(s) Oral every 6 hours PRN Moderate Pain (1 - 6)      VITALS:  T(F): 98.9 (03-22-21 @ 13:59), Max: 98.9 (03-22-21 @ 13:59)  HR: 99 (03-22-21 @ 13:59) (84 - 111)  BP: 114/61 (03-22-21 @ 13:59) (114/61 - 134/63)  RR: 18 (03-22-21 @ 13:59) (17 - 22)  SpO2: 95% (03-22-21 @ 13:59)  Wt(kg): --      PHYSICAL EXAM:  GENERAL: mild respiratory distress   CHEST/LUNG: Decreased breath sounds R base   HEART: Regular rate and rhythm; No murmurs, rubs, or gallops  ABDOMEN: Soft, Nontender, Nondistended; Bowel sounds present  EXTREMITIES:  2+ Peripheral Pulses, No clubbing, cyanosis, or edema  MSK: RLE sensation intact, able to wiggle toes, compartments soft, plantar and dorsiflexon intact.   PSYCH: AAOx3  NEUROLOGY: non-focal  SKIN: No rashes or lesions      LABS:              8.2                  130  | 28   | 14           11.56 >-----------< 197     ------------------------< 90                    24.5                 4.4  | 92   | 0.45                                         Ca 9.3   Mg x     Ph x            INR: 1.28 ratio<H>;    PT: 14.6 sec<H>;    PTT: 27.6 sec            RADIOLOGY & ADDITIONAL TESTS:  Imaging Personally Reviewed: [x] Yes    [ ] Consultant(s) Notes Reviewed:  [x] Care Discussed with Consultants/Other Providers: Orthopedic PA - discussed

## 2021-03-22 NOTE — DISCHARGE NOTE PROVIDER - HOSPITAL COURSE
Patient is a 69 yo female [facility] newly diagnosed metastatic lung cancer, gout, diverticulitis has a recent history of right pathologic femoral neck fx. She was transferred from [facility] on 3/19/2021 for surgery with Dr. Lord. She is s/p right hip hemiarthroplasty on 3/20/2021 with Dr. Lord without any intraoperative complications.  During hospital course, patient was followed by medicine and pulmonology specialities for continuity of care. Patient has hyponatremia since admission. She is being treated with fluid restriction. Chest Xray/US done by pulm showed malignant right pleural effusion.  It was recommended that patient undergo PleurX catheter placement by pulmonology versus repeat thoracentesis, however procedure was postponed until transfer back to [facility]. Per pulmololgy, patient stable for transfer back to [facility]. Patient is on 3L nasal cannula. Patient to be transferred back to [facility] under the care of transferring physician Dr. Sharda West. Patient to undergo further oncologic workup and care at [facility].   Patient is doing well and stable for discharge.  Patient is tolerating physical therapy: weight bearing to right leg, gait training, STRICT POSTERIOR HIP PRECAUTIONS.  Keep dressing on as is until day of staple removal.  Staples/sutures to be removed in Dr. Lord's office 14 days from date of surgery.  The patient had no post operative complications and clinically progressed faster than anticipated.  Patient was safely and appropriately discharged home. Patient is on Lovenox daily for anticoagulation.  Orthopaedic Surgeon Dr. Lord would like patient to call private MD/Internist for appointment postop to maintain continuity of care.  Follow up with Dr. Lord's office in 1-2 weeks.

## 2021-03-22 NOTE — PROGRESS NOTE ADULT - ATTENDING COMMENTS
patient s/p Right femoral neck fracture. Found to have thoracentesis + adenocarcinoma. needs to go for rehab but won't accept pleurx catheter. US of lungs very difficult today as patient was in a lot of pain, not in respiratory distress. D/w , pt will be transferring back to [facility]. signing off.

## 2021-03-22 NOTE — DISCHARGE NOTE PROVIDER - NSDCCPCAREPLAN_GEN_ALL_CORE_FT
PRINCIPAL DISCHARGE DIAGNOSIS  Diagnosis: Hip fracture  Assessment and Plan of Treatment: Patient is a 69 yo female PMH newly diagnosed metastatic lung cancer, gout, diverticulitis has a recent history of right pathologic femoral neck fx. She was transferred from [facility] on 3/19/2021 for surgery with Dr. Lord. She is s/p right hip hemiarthroplasty on 3/20/2021 with Dr. Lord without any intraoperative complications.  During hospital course, patient was followed by medicine and pulmonology specialities for continuity of care. Patient has hyponatremia since admission. She is being treated with fluid restriction. Chest Xray/US done by pulm showed malignant right pleural effusion.  It was recommended that patient undergo PleurX catheter placement by pulmonology versus repeat thoracentesis, however procedure was postponed until transfer back to [facility]. Per pulmololgy, patient stable for transfer back to [facility]. Patient is on 3L nasal cannula. Patient to be transferred back to [facility] under the care of transferring physician Dr. Sharda West. Patient to undergo further oncologic workup and care at [facility].   Patient is doing well and stable for discharge.  Patient is tolerating physical therapy: weight bearing to right leg, gait training, STRICT POSTERIOR HIP PRECAUTIONS.  Keep dressing on as is until day of staple removal.  Staples/sutures to be removed in Dr. Lord's office 14 days from date of surgery.  The patient had no post operative complications and clinically progressed faster than anticipated.  Patient was safely and appropriately discharged home. Patient is on Lovenox daily for anticoagulation.  Orthopaedic Surgeon Dr. Lord would like patient to call private MD/Internist for appointment postop to maintain continuity of care.  Follow up with Dr. Lord's office in 1-2 weeks.

## 2021-03-22 NOTE — DISCHARGE NOTE PROVIDER - CARE PROVIDER_API CALL
Javier Lord (MD)  Orthopaedic Surgery  [address]  [place] [address]  Phone: [phone]  Fax: [phone]  Follow Up Time: 2 weeks

## 2021-03-22 NOTE — DISCHARGE NOTE PROVIDER - NSDCMRMEDTOKEN_GEN_ALL_CORE_FT
acetaminophen 325 mg oral tablet: 3 tab(s) orally every 8 hours  aluminum hydroxide-magnesium hydroxide 200 mg-200 mg/5 mL oral suspension: 30 milliliter(s) orally every 6 hours, As needed, Dyspepsia  ascorbic acid 500 mg oral tablet: 1 tab(s) orally once a day  bisacodyl 10 mg rectal suppository: 1 suppository(ies) rectal once a day, As needed, If no bowel movement  enoxaparin: 40 unit(s) subcutaneous once a day  magnesium hydroxide 8% oral suspension: 30 milliliter(s) orally once a day, As needed, Constipation  melatonin 3 mg oral tablet: 1 tab(s) orally once a day (at bedtime), As needed, Insomnia  Multiple Vitamins oral tablet: 1 tab(s) orally once a day  pantoprazole 40 mg oral delayed release tablet: 1 tab(s) orally once a day (before a meal)  polyethylene glycol 3350 oral powder for reconstitution: 17 gram(s) orally once a day  senna oral tablet: 2 tab(s) orally once a day (at bedtime)  traMADol 50 mg oral tablet: 1 tab(s) orally every 6 hours, As needed, Severe Pain (7 - 10)  traMADol 50 mg oral tablet: 0.5 tab(s) orally every 6 hours, As needed, Moderate Pain (1 - 6)  zinc sulfate 220 mg oral capsule: 1 cap(s) orally once a day

## 2021-03-22 NOTE — PROGRESS NOTE ADULT - SUBJECTIVE AND OBJECTIVE BOX
Ortho       Patient is seen and examined at bedside.  No significant overnight events. Pain well controlled at moment. Complaining of nausea.     Vital Signs Last 24 Hrs  T(C): 36.4 (22 Mar 2021 02:00), Max: 36.7 (21 Mar 2021 09:08)  T(F): 97.6 (22 Mar 2021 02:00), Max: 98 (21 Mar 2021 09:08)  HR: 111 (22 Mar 2021 02:00) (84 - 111)  BP: 122/70 (22 Mar 2021 02:00) (105/49 - 134/63)  BP(mean): --  RR: 18 (22 Mar 2021 02:00) (18 - 22)  SpO2: 95% (22 Mar 2021 02:00) (93% - 96%)      Gen: mild increased work of breathing but able to speak in full sentence  LE: Dressing small area of drainage on central bandage  Motor grossly intact distal + EHL/FHL/ TA/ GS. Sensation is grossly intact to light touch distal. Compartments are soft. Extremity  warm.     Labs:                        8.8    8.73  )-----------( 189      ( 21 Mar 2021 07:28 )             26.3     03-21    132<L>  |  94<L>  |  14  ----------------------------<  104<H>  4.1   |  28  |  0.60    Ca    9.0      21 Mar 2021 07:28        A/P: Patient is a 70y y/o Female s/p right Hip louis-Arthroplasty, POD # 2  - Pain control / Analgesia  - Inc Spirometry   - Venodynes  - DVT Prophylaxis: lovenox  - PT / OT  - WBAT / OOB  - Posterior hip precautions  - FU labs  - FU pulm for possible pleurX

## 2021-03-22 NOTE — PROGRESS NOTE ADULT - SUBJECTIVE AND OBJECTIVE BOX
CHIEF COMPLAINT:    Interval Events:  Increased from 2LNC to 3LNC iso hypoxemia. Still with SOB, though also c/o pain s/p procedure.     REVIEW OF SYSTEMS:  Constitutional: [x] negative [ ] fevers [ ] chills [ ] weight loss [ ] weight gain  HEENT: [x] negative [ ] dry eyes [ ] eye irritation [ ] postnasal drip [ ] nasal congestion  CV: [x] negative  [ ] chest pain [ ] orthopnea [ ] palpitations [ ] murmur  Resp: [ ] negative [ ] cough [x] shortness of breath [ ] dyspnea [ ] wheezing [ ] sputum [ ] hemoptysis  GI: [x] negative [ ] nausea [ ] vomiting [ ] diarrhea [ ] constipation [ ] abd pain [ ] dysphagia   : [x] negative [ ] dysuria [ ] nocturia [ ] hematuria [ ] increased urinary frequency  Musculoskeletal: [ ] negative [ ] back pain [ ] myalgias [x] arthralgias [ ] fracture  Skin: [ ] negative [ ] rash [ ] itch  Neurological: [ ] negative [ ] headache [ ] dizziness [ ] syncope [ ] weakness [ ] numbness  Psychiatric: [ ] negative [ ] anxiety [ ] depression  Endocrine: [ ] negative [ ] diabetes [ ] thyroid problem  Hematologic/Lymphatic: [ ] negative [ ] anemia [ ] bleeding problem  Allergic/Immunologic: [ ] negative [ ] itchy eyes [ ] nasal discharge [ ] hives [ ] angioedema  [ ] All other systems negative  [ ] Unable to assess ROS because ________    OBJECTIVE:  ICU Vital Signs Last 24 Hrs  T(C): 36.8 (22 Mar 2021 17:56), Max: 37.2 (22 Mar 2021 13:59)  T(F): 98.3 (22 Mar 2021 17:56), Max: 98.9 (22 Mar 2021 13:59)  HR: 110 (22 Mar 2021 17:56) (99 - 111)  BP: 144/89 (22 Mar 2021 17:56) (114/61 - 144/89)  BP(mean): --  ABP: --  ABP(mean): --  RR: 18 (22 Mar 2021 17:56) (17 - 21)  SpO2: 95% (22 Mar 2021 17:56) (95% - 97%)        03-21 @ 07:01  -  03-22 @ 07:00  --------------------------------------------------------  IN: 1455 mL / OUT: 950 mL / NET: 505 mL    03-22 @ 07:01  - 03-22 @ 18:58  --------------------------------------------------------  IN: 0 mL / OUT: 400 mL / NET: -400 mL      CAPILLARY BLOOD GLUCOSE          PHYSICAL EXAM:  GEN: Elderly female, NAD  HEENT: EOMi  CV: RR  PULM: CTAB  ABD: Soft  EXT: [facility]    HOSPITAL MEDICATIONS:  MEDICATIONS  (STANDING):  acetaminophen   Tablet .. 975 milliGRAM(s) Oral every 8 hours  ascorbic acid 500 milliGRAM(s) Oral daily  multivitamin 1 Tablet(s) Oral daily  pantoprazole    Tablet 40 milliGRAM(s) Oral before breakfast  polyethylene glycol 3350 17 Gram(s) Oral daily  senna 2 Tablet(s) Oral at bedtime  zinc sulfate 220 milliGRAM(s) Oral daily    MEDICATIONS  (PRN):  aluminum hydroxide/magnesium hydroxide/simethicone Suspension 30 milliLiter(s) Oral every 6 hours PRN Dyspepsia  bisacodyl Suppository 10 milliGRAM(s) Rectal daily PRN If no bowel movement  magnesium hydroxide Suspension 30 milliLiter(s) Oral daily PRN Constipation  melatonin 3 milliGRAM(s) Oral at bedtime PRN Insomnia  ondansetron Injectable 4 milliGRAM(s) IV Push every 6 hours PRN Nausea and/or Vomiting  traMADol 50 milliGRAM(s) Oral every 6 hours PRN Severe Pain (7 - 10)  traMADol 25 milliGRAM(s) Oral every 6 hours PRN Moderate Pain (1 - 6)      LABS:                        8.2    11.56 )-----------( 197      ( 22 Mar 2021 11:23 )             24.5     Hgb Trend: 8.2<--, 8.8<--, 10.1<--, 9.5<--, 10.2<--  03-22    130<L>  |  92<L>  |  14  ----------------------------<  90  4.4   |  28  |  0.45<L>    Ca    9.3      22 Mar 2021 11:23      Creatinine Trend: 0.45<--, 0.60<--, 0.49<--, 0.63<--, 0.57<--, 0.6<--  PT/INR - ( 22 Mar 2021 11:23 )   PT: 14.6 sec;   INR: 1.28 ratio         PTT - ( 22 Mar 2021 11:23 )  PTT:27.6 sec          MICROBIOLOGY:       RADIOLOGY:  [ ] Reviewed and interpreted by me    PULMONARY FUNCTION TESTS:    EKG:

## 2021-03-22 NOTE — DISCHARGE NOTE PROVIDER - NSDCCPTREATMENT_GEN_ALL_CORE_FT
PRINCIPAL PROCEDURE  Procedure: Hemiarthroplasty of right hip  Findings and Treatment: See dictated operative note

## 2021-03-23 ENCOUNTER — INPATIENT (INPATIENT)
Facility: HOSPITAL | Age: 71
LOS: 12 days | End: 2021-04-05
Attending: INTERNAL MEDICINE | Admitting: INTERNAL MEDICINE
Payer: MEDICARE

## 2021-03-23 VITALS
TEMPERATURE: 98 F | DIASTOLIC BLOOD PRESSURE: 99 MMHG | RESPIRATION RATE: 18 BRPM | HEART RATE: 113 BPM | SYSTOLIC BLOOD PRESSURE: 118 MMHG | OXYGEN SATURATION: 100 %

## 2021-03-23 DIAGNOSIS — Z90.710 ACQUIRED ABSENCE OF BOTH CERVIX AND UTERUS: Chronic | ICD-10-CM

## 2021-03-23 LAB
ANION GAP SERPL CALC-SCNC: 12 MMOL/L — SIGNIFICANT CHANGE UP (ref 7–14)
BLD GP AB SCN SERPL QL: NEGATIVE — SIGNIFICANT CHANGE UP
BUN SERPL-MCNC: 17 MG/DL — SIGNIFICANT CHANGE UP (ref 7–23)
CALCIUM SERPL-MCNC: 9.6 MG/DL — SIGNIFICANT CHANGE UP (ref 8.4–10.5)
CHLORIDE SERPL-SCNC: 91 MMOL/L — LOW (ref 98–107)
CO2 SERPL-SCNC: 28 MMOL/L — SIGNIFICANT CHANGE UP (ref 22–31)
CREAT SERPL-MCNC: 0.48 MG/DL — LOW (ref 0.5–1.3)
GLUCOSE SERPL-MCNC: 95 MG/DL — SIGNIFICANT CHANGE UP (ref 70–99)
HCT VFR BLD CALC: 25 % — LOW (ref 34.5–45)
HGB BLD-MCNC: 8.3 G/DL — LOW (ref 11.5–15.5)
MAGNESIUM SERPL-MCNC: 2 MG/DL — SIGNIFICANT CHANGE UP (ref 1.6–2.6)
MCHC RBC-ENTMCNC: 31.1 PG — SIGNIFICANT CHANGE UP (ref 27–34)
MCHC RBC-ENTMCNC: 33.2 GM/DL — SIGNIFICANT CHANGE UP (ref 32–36)
MCV RBC AUTO: 93.6 FL — SIGNIFICANT CHANGE UP (ref 80–100)
NRBC # BLD: 0 /100 WBCS — SIGNIFICANT CHANGE UP
NRBC # FLD: 0 K/UL — SIGNIFICANT CHANGE UP
OSMOLALITY SERPL: 274 MOSM/KG — LOW (ref 275–295)
PHOSPHATE SERPL-MCNC: 3.7 MG/DL — SIGNIFICANT CHANGE UP (ref 2.5–4.5)
PLATELET # BLD AUTO: 219 K/UL — SIGNIFICANT CHANGE UP (ref 150–400)
POTASSIUM SERPL-MCNC: 4.3 MMOL/L — SIGNIFICANT CHANGE UP (ref 3.5–5.3)
POTASSIUM SERPL-SCNC: 4.3 MMOL/L — SIGNIFICANT CHANGE UP (ref 3.5–5.3)
RBC # BLD: 2.67 M/UL — LOW (ref 3.8–5.2)
RBC # FLD: 15.4 % — HIGH (ref 10.3–14.5)
RH IG SCN BLD-IMP: POSITIVE — SIGNIFICANT CHANGE UP
SODIUM SERPL-SCNC: 131 MMOL/L — LOW (ref 135–145)
WBC # BLD: 12.12 K/UL — HIGH (ref 3.8–10.5)
WBC # FLD AUTO: 12.12 K/UL — HIGH (ref 3.8–10.5)

## 2021-03-23 PROCEDURE — 99233 SBSQ HOSP IP/OBS HIGH 50: CPT

## 2021-03-23 RX ORDER — ENOXAPARIN SODIUM 100 MG/ML
40 INJECTION SUBCUTANEOUS DAILY
Refills: 0 | Status: DISCONTINUED | OUTPATIENT
Start: 2021-03-23 | End: 2021-03-23

## 2021-03-23 RX ADMIN — ENOXAPARIN SODIUM 40 MILLIGRAM(S): 100 INJECTION SUBCUTANEOUS at 13:33

## 2021-03-23 RX ADMIN — SENNA PLUS 2 TABLET(S): 8.6 TABLET ORAL at 21:31

## 2021-03-23 RX ADMIN — TRAMADOL HYDROCHLORIDE 50 MILLIGRAM(S): 50 TABLET ORAL at 14:20

## 2021-03-23 RX ADMIN — Medication 500 MILLIGRAM(S): at 13:34

## 2021-03-23 RX ADMIN — Medication 975 MILLIGRAM(S): at 13:33

## 2021-03-23 RX ADMIN — PANTOPRAZOLE SODIUM 40 MILLIGRAM(S): 20 TABLET, DELAYED RELEASE ORAL at 06:14

## 2021-03-23 RX ADMIN — Medication 975 MILLIGRAM(S): at 21:31

## 2021-03-23 RX ADMIN — TRAMADOL HYDROCHLORIDE 50 MILLIGRAM(S): 50 TABLET ORAL at 13:26

## 2021-03-23 RX ADMIN — Medication 975 MILLIGRAM(S): at 06:14

## 2021-03-23 RX ADMIN — TRAMADOL HYDROCHLORIDE 50 MILLIGRAM(S): 50 TABLET ORAL at 04:29

## 2021-03-23 RX ADMIN — Medication 30 MILLILITER(S): at 21:32

## 2021-03-23 RX ADMIN — Medication 1 TABLET(S): at 13:34

## 2021-03-23 RX ADMIN — POLYETHYLENE GLYCOL 3350 17 GRAM(S): 17 POWDER, FOR SOLUTION ORAL at 13:34

## 2021-03-23 RX ADMIN — ZINC SULFATE TAB 220 MG (50 MG ZINC EQUIVALENT) 220 MILLIGRAM(S): 220 (50 ZN) TAB at 13:33

## 2021-03-23 RX ADMIN — TRAMADOL HYDROCHLORIDE 50 MILLIGRAM(S): 50 TABLET ORAL at 19:20

## 2021-03-23 NOTE — PROGRESS NOTE ADULT - PROBLEM SELECTOR PLAN 6
Iron studies consistent with AOCD  Likely in setting of post-op + lung adenocarcinoma   Monitor CBC
Iron studies consistent with AOCD  Likely in setting of post-op + lung adenocarcinoma   Monitor CBC
DVT ppx: lovenox  Dispo: Likely needs ZAY

## 2021-03-23 NOTE — PROGRESS NOTE ADULT - SUBJECTIVE AND OBJECTIVE BOX
Ortho       Patient is seen and examined at bedside.  No significant overnight events. Pain well controlled at moment.      Vital Signs Last 24 Hrs  T(C): 36.4 (23 Mar 2021 06:11), Max: 37.2 (22 Mar 2021 13:59)  T(F): 97.6 (23 Mar 2021 06:11), Max: 98.9 (22 Mar 2021 13:59)  HR: 97 (23 Mar 2021 06:11) (97 - 110)  BP: 136/78 (23 Mar 2021 04:29) (114/61 - 144/89)  BP(mean): --  RR: 17 (23 Mar 2021 06:11) (17 - 18)  SpO2: 97% (23 Mar 2021 06:11) (95% - 100%)    Gen: NAD    LE: Dressing with serosanguinous drainage, changed, staples C/D/I, +ecchymosis  Motor grossly intact distal + EHL/FHL/ TA/ GS. Sensation is grossly intact to light touch distal. Compartments are soft. Extremity  warm.    Labs:                        8.2    11.56 )-----------( 197      ( 22 Mar 2021 11:23 )             24.5     03-22    130<L>  |  92<L>  |  14  ----------------------------<  90  4.4   |  28  |  0.45<L>    Ca    9.3      22 Mar 2021 11:23        A/P: Patient is a 70y y/o Female s/p right Hip louis- Arthroplasty, POD # 3  - Pain control / Analgesia  - Inc Spirometry   - Venodynes  - DVT Prophylaxis - lovenox , on hold for pleurX  - PT / OT  - WBAT / OOB  - Posterior hip precautions  - transfer to [facility] for continued cancer work up and care  - pleurX today with pulm if patient still in house

## 2021-03-23 NOTE — CHART NOTE - NSCHARTNOTEFT_GEN_A_CORE
Orthopaedics Post Op Check Note    Procedure: Right hip hemiarthroplasty  Surgeon:     Pt comfortable, without complaints  Denies CP, N/V, numbness/tingling   Reports back pain that she had preop    Vital Signs Last 24 Hrs  T(C): --  T(F): --  HR: 94 (03-20-21 @ 15:15) (93 - 96)  BP: 88/68 (03-20-21 @ 15:15) (88/68 - 115/98)  BP(mean): 73 (03-20-21 @ 15:15) (69 - 102)  RR: 17 (03-20-21 @ 15:15) (17 - 20)  SpO2: 97% (03-20-21 @ 15:15) (96% - 100%)  AVSS, NAD    Gen: awake, alert, NAD  Resp: no increased work of breathing  RLE:  Dressing clean, dry and intact  +EHL/FHL/TA/GS  SILT S/S/SP/DP  +DP/PT Pulses  Compartments soft  No calf TTP     Other:    Post Op labs:  20 Mar 2021 14:41    130    |  92     |  13     ----------------------------<  122    4.1     |  27     |  0.49     Ca    8.7        20 Mar 2021 14:41  Phos  4.6       19 Mar 2021 14:48  Mg     2.1       19 Mar 2021 14:48    TPro  x      /  Alb  2.9    /  TBili  x      /  DBili  x      /  AST  x      /  ALT  x      /  AlkPhos  x      20 Mar 2021 05:03                          10.1   11.21 )-----------( 185      ( 20 Mar 2021 14:41 )             31.2       Post op XR:    A/P: 70yFemale POD#0 s/p Right hip louis    - Stable  - Pain Control  - DVT ppx: lovenox   - Emperatriz op abx: Ancef  - PT, WBS: WBAT  - F/U AM Labs      Ortho Pager  [phone]
Patient to be transferred back to [facility] to continue oncologic workup and further care.   Spoke with attending taking care of patient at [facility] prior to transfer, Dr. Sharda West [phone]/ [phone].  Dr. West agrees to accept patient under her service for transfer. States she would prefer to have patient transferred [facility] for further workup/testing.   Pulmonology evaluated patient today 3/22/2021, planned originally for PleurX catheter placement tomorrow. Per pulmonology, patient is stable to have PleurX placed or undergo repeat thoracentesis at [facility].  Patient is stable for transfer on 3L nasal cannula from pulmonology standpoint.   Transfer center [phone] called and transfer from [facility] to [facility] was initiated.   Stable for transfer from orthopedic standpoint. Dr. Lord made aware.   Patient and family made aware.
spoke to pulmonary. unable to do bedside thoracentesis today. pt is stable at this time. will re-evaluate tomorrow.
: Yue Edmond    INDICATION: Pleural effusion    PROCEDURE:  [ ] LIMITED ECHO  [X] LIMITED CHEST  [ ] LIMITED RETROPERITONEAL  [ ] LIMITED ABDOMINAL  [ ] LIMITED DVT  [ ] NEEDLE GUIDANCE VASCULAR  [ ] NEEDLE GUIDANCE THORACENTESIS  [ ] NEEDLE GUIDANCE PARACENTESIS  [ ] NEEDLE GUIDANCE PERICARDIOCENTESIS  [ ] OTHER    FINDINGS: Trace L pleural effusion. Moderate R pleural effusion.      INTERPRETATION: Trace L pleural effusion. Moderate R pleural effusion.    Images stored on Qpath.    Kenyon Edmond MD  Pulmonary & Critical Care Fellow  [phone]

## 2021-03-23 NOTE — PROGRESS NOTE ADULT - PROBLEM SELECTOR PLAN 1
POD #1 s/p left hip louis  - Care per primary ortho team   - Pain control: tramadol prn, acetaminophen. Consider adding oxycodone PRN if needed   - Bowel regimen   - mechanical/DVT ppx  - OOB/PT  - WBAT  - Encourage incentive spirometry
Patient endorsing back pain   - CT A/P @ [facility] showing likely pathologic compression deformity of T11 vertebral body  - NM Bone scan showing multiple metastatic lesions  - Recommend MRI Thoracic & lumbosacral spine --> if patient has compression fx may benefit for XRT for pain control. If that is the case, patient can be transferred to medicine service for further management.
Patient endorsing back pain   - CT A/P @ [facility] showing likely pathologic compression deformity of T11 vertebral body  - NM Bone scan showing multiple metastatic lesions  - Recommend MRI Thoracic & lumbosacral spine --> if patient has compression fx may benefit for XRT for pain control. Patient likely being transferred to [facility] for continuation of cancer staging/management. Can attempt to obtain MRI here prior to transfer

## 2021-03-23 NOTE — PROGRESS NOTE ADULT - ASSESSMENT
70F with PMH of OA of b/l knees, gout, diverticulosis, who initially presented to OSH on 3/13/21 with difficulty ambulating. Work up revealed R femoral neck pathologic fracture in setting of newly diagnosed lung adenocarcinoma s/p Right hip hemiarthroplasty
71 yo F PMH bilatearl OA, gout, diverticulosis p/w difficulty ambulating and hip pain. CT showing RUL mass w/ hilar/mediastinal extension and moderate to large R pleural effusion. S/p thoracentesis 3/18 w/ +adenocarcinoma. Also s/p R hip hemiarthroplasty 3/20. Pulmonary consulted for pleural effusion    - US today with anterior loculated effusion  - PleurX deferred as pt unable to be placed in rehab  - no need for urgent thoracentesis at this time  - pt to be discharged back to [facility]  - will need pulmonary follow up at [facility] for evaluation of repeat thoracentesis vs. pleurx    Kenneth Yung MD  PGY-4  PCCM Fellow  Pager [phone]
70F with PMH of OA of b/l knees, gout, diverticulosis, who initially presented to OSH on 3/13/21 with difficulty ambulating. Work up revealed R femoral neck pathologic fracture in setting of newly diagnosed lung adenocarcinoma s/p Right hip hemiarthroplasty
70F with PMH of OA of b/l knees, gout, diverticulosis, who initially presented to OSH on 3/13/21 with difficulty ambulating. Work up revealed R femoral neck pathologic fracture in setting of newly diagnosed lung adenocarcinoma s/p Right hip hemiarthroplasty

## 2021-03-23 NOTE — PROGRESS NOTE ADULT - PROBLEM SELECTOR PLAN 3
Malignant effusion s/p Thora 3/15, 1L fluid removed  - Patient reports worsening SOB  - CXR w. increasing pleural effusion  - Pulm c/s appreciated, may need pleurX given recurrence of malignant effusion ?tomorrow   - Encourage incentive spirometry
Chronic since admission, improved to 130   - high urine Na, likely SIADH likely due to pain and lung pathology    - Recommend pain control, fluid restrict  - Monitor Na  - Can consider salt tabs if Na downtrends
Malignant effusion s/p Thora 3/15, 1L fluid removed  - Patient reports labored breathing, on 2-3L NC   - CXR w. increasing pleural effusion  - Pulm c/s appreciated, may need pleurX given recurrence of malignant effusion, however current plan is to transfer back to [facility]. No urgent need for thoracentesis   - Encourage incentive spirometry

## 2021-03-23 NOTE — PROGRESS NOTE ADULT - REASON FOR ADMISSION
right femoral neck fracture

## 2021-03-23 NOTE — PROGRESS NOTE ADULT - PROBLEM SELECTOR PLAN 4
Chronic since admission ~130  - high urine Na, likely SIADH likely due to pain and lung pathology    - Recommend pain control  - Check urine Na and Osm  - Can start fluid restriction if urine studies c/w SIADH  - Check Serum Osm and TSH  - Monitor Na  - Can consider salt tabs if Na downtrends
New diagnosis of metastatic lung CA at [facility] s/p thoracentesis   - Was getting work-up and staging at OSH   - Diffuse mets to bones  - Heme onc following at OSH, plan for possible targeted or immunotherapy.  - If patient is not transferred back to [facility], will need onc c/s here and MRI head w/w/o Pipe, MRI Thoracic & lumbosacral spine noncontrast to complete work-up
Chronic since admission ~130  - high urine Na/Osm, likely SIADH likely due to pain and lung pathology    - Recommend pain control  - Can start fluid restriction   - Monitor Na  - Can consider salt tabs if Na downtrends

## 2021-03-23 NOTE — PROGRESS NOTE ADULT - SUBJECTIVE AND OBJECTIVE BOX
CHIEF COMPLAINT: f/u     SUBJECTIVE / OVERNIGHT EVENTS: Patient seen and examined. States she feels okay today, denies back or hip pain. States her breathing is about the same, remains labored but not worse. She is not eating much, drinking juice. Urinating without issues. Denies BM.     MEDICATIONS  (STANDING):  acetaminophen   Tablet .. 975 milliGRAM(s) Oral every 8 hours  ascorbic acid 500 milliGRAM(s) Oral daily  enoxaparin Injectable 40 milliGRAM(s) SubCutaneous daily  multivitamin 1 Tablet(s) Oral daily  pantoprazole    Tablet 40 milliGRAM(s) Oral before breakfast  polyethylene glycol 3350 17 Gram(s) Oral daily  senna 2 Tablet(s) Oral at bedtime  zinc sulfate 220 milliGRAM(s) Oral daily    MEDICATIONS  (PRN):  aluminum hydroxide/magnesium hydroxide/simethicone Suspension 30 milliLiter(s) Oral every 6 hours PRN Dyspepsia  bisacodyl Suppository 10 milliGRAM(s) Rectal daily PRN If no bowel movement  magnesium hydroxide Suspension 30 milliLiter(s) Oral daily PRN Constipation  melatonin 3 milliGRAM(s) Oral at bedtime PRN Insomnia  ondansetron Injectable 4 milliGRAM(s) IV Push every 6 hours PRN Nausea and/or Vomiting  traMADol 50 milliGRAM(s) Oral every 6 hours PRN Severe Pain (7 - 10)  traMADol 25 milliGRAM(s) Oral every 6 hours PRN Moderate Pain (1 - 6)      VITALS:  T(F): 97.5 (03-23-21 @ 09:50), Max: 98.9 (03-22-21 @ 13:59)  HR: 99 (03-23-21 @ 09:50) (97 - 110)  BP: 123/78 (03-23-21 @ 09:50) (114/61 - 144/89)  RR: 18 (03-23-21 @ 09:50) (17 - 18)  SpO2: 96% (03-23-21 @ 09:50)  Wt(kg): --      PHYSICAL EXAM:  GENERAL: chronically ill appearing, NAD    CHEST/LUNG: Decreased breath sounds R base   HEART: Regular rate and rhythm; No murmurs, rubs, or gallops  ABDOMEN: Soft, Nontender, Nondistended; Bowel sounds present  MSK: RLE sensation intact, able to wiggle toes, compartments soft, plantar and dorsiflexon intact.   PSYCH: AAOx3  NEUROLOGY: non-focal  SKIN: No rashes or lesions    LABS:              8.3                  131  | 28   | 17           12.12 >-----------< 219     ------------------------< 95                    25.0                 4.3  | 91   | 0.48                                         Ca 9.6   Mg 2.0   Ph 3.7          INR: 1.28 ratio<H>;    PT: 14.6 sec<H>;    PTT: 27.6 sec            RADIOLOGY & ADDITIONAL TESTS:  Imaging Personally Reviewed: [x] Yes    [ ] Consultant(s) Notes Reviewed:  [x] Care Discussed with Consultants/Other Providers: Orthopedic PA - discussed CHIEF COMPLAINT: f/u     SUBJECTIVE / OVERNIGHT EVENTS: Patient seen and examined. States she feels okay today, denies back or hip pain. States her breathing is about the same, remains labored but not worse. She is not eating much, drinking juice. Urinating without issues. Denies BM.   Spoke w. patient's sister Carmita and updated per patient request    MEDICATIONS  (STANDING):  acetaminophen   Tablet .. 975 milliGRAM(s) Oral every 8 hours  ascorbic acid 500 milliGRAM(s) Oral daily  enoxaparin Injectable 40 milliGRAM(s) SubCutaneous daily  multivitamin 1 Tablet(s) Oral daily  pantoprazole    Tablet 40 milliGRAM(s) Oral before breakfast  polyethylene glycol 3350 17 Gram(s) Oral daily  senna 2 Tablet(s) Oral at bedtime  zinc sulfate 220 milliGRAM(s) Oral daily    MEDICATIONS  (PRN):  aluminum hydroxide/magnesium hydroxide/simethicone Suspension 30 milliLiter(s) Oral every 6 hours PRN Dyspepsia  bisacodyl Suppository 10 milliGRAM(s) Rectal daily PRN If no bowel movement  magnesium hydroxide Suspension 30 milliLiter(s) Oral daily PRN Constipation  melatonin 3 milliGRAM(s) Oral at bedtime PRN Insomnia  ondansetron Injectable 4 milliGRAM(s) IV Push every 6 hours PRN Nausea and/or Vomiting  traMADol 50 milliGRAM(s) Oral every 6 hours PRN Severe Pain (7 - 10)  traMADol 25 milliGRAM(s) Oral every 6 hours PRN Moderate Pain (1 - 6)      VITALS:  T(F): 97.5 (03-23-21 @ 09:50), Max: 98.9 (03-22-21 @ 13:59)  HR: 99 (03-23-21 @ 09:50) (97 - 110)  BP: 123/78 (03-23-21 @ 09:50) (114/61 - 144/89)  RR: 18 (03-23-21 @ 09:50) (17 - 18)  SpO2: 96% (03-23-21 @ 09:50)  Wt(kg): --      PHYSICAL EXAM:  GENERAL: chronically ill appearing, NAD    CHEST/LUNG: Decreased breath sounds R base   HEART: Regular rate and rhythm; No murmurs, rubs, or gallops  ABDOMEN: Soft, Nontender, Nondistended; Bowel sounds present  MSK: RLE sensation intact, able to wiggle toes, compartments soft, plantar and dorsiflexon intact.   PSYCH: AAOx3  NEUROLOGY: non-focal  SKIN: No rashes or lesions    LABS:              8.3                  131  | 28   | 17           12.12 >-----------< 219     ------------------------< 95                    25.0                 4.3  | 91   | 0.48                                         Ca 9.6   Mg 2.0   Ph 3.7          INR: 1.28 ratio<H>;    PT: 14.6 sec<H>;    PTT: 27.6 sec            RADIOLOGY & ADDITIONAL TESTS:  Imaging Personally Reviewed: [x] Yes    [ ] Consultant(s) Notes Reviewed:  [x] Care Discussed with Consultants/Other Providers: Orthopedic PA - discussed

## 2021-03-23 NOTE — PROGRESS NOTE ADULT - PROBLEM SELECTOR PLAN 7
DVT ppx: lovenox  Dispo: transfer back to [facility] for further work-up/management of malignancy
DVT ppx: lovenox  Dispo: Likely needs ZAY
stated

## 2021-03-23 NOTE — PROGRESS NOTE ADULT - PROBLEM SELECTOR PLAN 2
POD #2 s/p left hip louis  - Care per primary ortho team   - Pain control: tramadol prn, acetaminophen. Consider adding oxycodone PRN if needed   - Bowel regimen   - mechanical/DVT ppx  - OOB/PT  - WBAT  - Encourage incentive spirometry
POD #3 s/p left hip louis  - Care per primary ortho team   - Pain control: tramadol prn, acetaminophen. Consider adding oxycodone PRN if needed   - Bowel regimen   - mechanical/DVT ppx  - OOB/PT  - WBAT  - Encourage incentive spirometry
Malignant effusion s/p Thora 3/15, 1L fluid removed  - Patient reports worsening SOB  - CXR w. increasing pleural effusion  - Pulm c/s for possible thoracentesis  - May need pleurX if effusion continues to reaccumulate   - O2 as needed  - Encourage incentive spirometry

## 2021-03-24 VITALS
HEART RATE: 101 BPM | SYSTOLIC BLOOD PRESSURE: 138 MMHG | TEMPERATURE: 96 F | DIASTOLIC BLOOD PRESSURE: 74 MMHG | RESPIRATION RATE: 18 BRPM

## 2021-03-24 LAB
ALBUMIN SERPL ELPH-MCNC: 2.7 G/DL — LOW (ref 3.5–5.2)
ALP SERPL-CCNC: 148 U/L — HIGH (ref 30–115)
ALT FLD-CCNC: 9 U/L — SIGNIFICANT CHANGE UP (ref 0–41)
ANION GAP SERPL CALC-SCNC: 10 MMOL/L — SIGNIFICANT CHANGE UP (ref 7–14)
AST SERPL-CCNC: 27 U/L — SIGNIFICANT CHANGE UP (ref 0–41)
BASOPHILS # BLD AUTO: 0.06 K/UL — SIGNIFICANT CHANGE UP (ref 0–0.2)
BASOPHILS NFR BLD AUTO: 0.5 % — SIGNIFICANT CHANGE UP (ref 0–1)
BILIRUB SERPL-MCNC: 0.7 MG/DL — SIGNIFICANT CHANGE UP (ref 0.2–1.2)
BUN SERPL-MCNC: 16 MG/DL — SIGNIFICANT CHANGE UP (ref 10–20)
CALCIUM SERPL-MCNC: 9 MG/DL — SIGNIFICANT CHANGE UP (ref 8.5–10.1)
CHLORIDE SERPL-SCNC: 94 MMOL/L — LOW (ref 98–110)
CO2 SERPL-SCNC: 26 MMOL/L — SIGNIFICANT CHANGE UP (ref 17–32)
CREAT SERPL-MCNC: <0.5 MG/DL — LOW (ref 0.7–1.5)
EOSINOPHIL # BLD AUTO: 0.23 K/UL — SIGNIFICANT CHANGE UP (ref 0–0.7)
EOSINOPHIL NFR BLD AUTO: 1.8 % — SIGNIFICANT CHANGE UP (ref 0–8)
GLUCOSE SERPL-MCNC: 90 MG/DL — SIGNIFICANT CHANGE UP (ref 70–99)
HCT VFR BLD CALC: 23.2 % — LOW (ref 37–47)
HGB BLD-MCNC: 8 G/DL — LOW (ref 12–16)
IMM GRANULOCYTES NFR BLD AUTO: 2.4 % — HIGH (ref 0.1–0.3)
LYMPHOCYTES # BLD AUTO: 1.64 K/UL — SIGNIFICANT CHANGE UP (ref 1.2–3.4)
LYMPHOCYTES # BLD AUTO: 12.5 % — LOW (ref 20.5–51.1)
MAGNESIUM SERPL-MCNC: 1.9 MG/DL — SIGNIFICANT CHANGE UP (ref 1.8–2.4)
MCHC RBC-ENTMCNC: 32.7 PG — HIGH (ref 27–31)
MCHC RBC-ENTMCNC: 34.5 G/DL — SIGNIFICANT CHANGE UP (ref 32–37)
MCV RBC AUTO: 94.7 FL — SIGNIFICANT CHANGE UP (ref 81–99)
MONOCYTES # BLD AUTO: 0.79 K/UL — HIGH (ref 0.1–0.6)
MONOCYTES NFR BLD AUTO: 6 % — SIGNIFICANT CHANGE UP (ref 1.7–9.3)
NEUTROPHILS # BLD AUTO: 10.09 K/UL — HIGH (ref 1.4–6.5)
NEUTROPHILS NFR BLD AUTO: 76.8 % — HIGH (ref 42.2–75.2)
NRBC # BLD: 0 /100 WBCS — SIGNIFICANT CHANGE UP (ref 0–0)
PHOSPHATE SERPL-MCNC: 3.9 MG/DL — SIGNIFICANT CHANGE UP (ref 2.1–4.9)
PLATELET # BLD AUTO: 254 K/UL — SIGNIFICANT CHANGE UP (ref 130–400)
POTASSIUM SERPL-MCNC: 4.8 MMOL/L — SIGNIFICANT CHANGE UP (ref 3.5–5)
POTASSIUM SERPL-SCNC: 4.8 MMOL/L — SIGNIFICANT CHANGE UP (ref 3.5–5)
PROT SERPL-MCNC: 5.4 G/DL — LOW (ref 6–8)
RBC # BLD: 2.45 M/UL — LOW (ref 4.2–5.4)
RBC # FLD: 15.3 % — HIGH (ref 11.5–14.5)
SODIUM SERPL-SCNC: 130 MMOL/L — LOW (ref 135–146)
WBC # BLD: 13.13 K/UL — HIGH (ref 4.8–10.8)
WBC # FLD AUTO: 13.13 K/UL — HIGH (ref 4.8–10.8)

## 2021-03-24 PROCEDURE — 99233 SBSQ HOSP IP/OBS HIGH 50: CPT

## 2021-03-24 PROCEDURE — 71045 X-RAY EXAM CHEST 1 VIEW: CPT | Mod: 26

## 2021-03-24 RX ORDER — TRAMADOL HYDROCHLORIDE 50 MG/1
50 TABLET ORAL EVERY 8 HOURS
Refills: 0 | Status: DISCONTINUED | OUTPATIENT
Start: 2021-03-24 | End: 2021-03-26

## 2021-03-24 RX ORDER — CHLORHEXIDINE GLUCONATE 213 G/1000ML
1 SOLUTION TOPICAL
Refills: 0 | Status: DISCONTINUED | OUTPATIENT
Start: 2021-03-24 | End: 2021-04-05

## 2021-03-24 RX ORDER — LIDOCAINE 4 G/100G
1 CREAM TOPICAL DAILY
Refills: 0 | Status: DISCONTINUED | OUTPATIENT
Start: 2021-03-24 | End: 2021-04-05

## 2021-03-24 RX ORDER — PANTOPRAZOLE SODIUM 20 MG/1
40 TABLET, DELAYED RELEASE ORAL
Refills: 0 | Status: DISCONTINUED | OUTPATIENT
Start: 2021-03-24 | End: 2021-04-05

## 2021-03-24 RX ORDER — SENNA PLUS 8.6 MG/1
2 TABLET ORAL AT BEDTIME
Refills: 0 | Status: DISCONTINUED | OUTPATIENT
Start: 2021-03-24 | End: 2021-04-05

## 2021-03-24 RX ORDER — TRAMADOL HYDROCHLORIDE 50 MG/1
50 TABLET ORAL ONCE
Refills: 0 | Status: DISCONTINUED | OUTPATIENT
Start: 2021-03-24 | End: 2021-03-24

## 2021-03-24 RX ORDER — POLYETHYLENE GLYCOL 3350 17 G/17G
17 POWDER, FOR SOLUTION ORAL DAILY
Refills: 0 | Status: DISCONTINUED | OUTPATIENT
Start: 2021-03-24 | End: 2021-04-05

## 2021-03-24 RX ORDER — ASCORBIC ACID 60 MG
500 TABLET,CHEWABLE ORAL DAILY
Refills: 0 | Status: DISCONTINUED | OUTPATIENT
Start: 2021-03-24 | End: 2021-04-05

## 2021-03-24 RX ORDER — ACETAMINOPHEN 500 MG
650 TABLET ORAL EVERY 6 HOURS
Refills: 0 | Status: DISCONTINUED | OUTPATIENT
Start: 2021-03-24 | End: 2021-03-30

## 2021-03-24 RX ORDER — ENOXAPARIN SODIUM 100 MG/ML
40 INJECTION SUBCUTANEOUS DAILY
Refills: 0 | Status: DISCONTINUED | OUTPATIENT
Start: 2021-03-24 | End: 2021-03-24

## 2021-03-24 RX ADMIN — PANTOPRAZOLE SODIUM 40 MILLIGRAM(S): 20 TABLET, DELAYED RELEASE ORAL at 05:31

## 2021-03-24 RX ADMIN — SENNA PLUS 2 TABLET(S): 8.6 TABLET ORAL at 23:36

## 2021-03-24 RX ADMIN — LIDOCAINE 1 PATCH: 4 CREAM TOPICAL at 18:16

## 2021-03-24 RX ADMIN — TRAMADOL HYDROCHLORIDE 50 MILLIGRAM(S): 50 TABLET ORAL at 02:45

## 2021-03-24 RX ADMIN — Medication 500 MILLIGRAM(S): at 11:13

## 2021-03-24 RX ADMIN — Medication 650 MILLIGRAM(S): at 18:16

## 2021-03-24 RX ADMIN — POLYETHYLENE GLYCOL 3350 17 GRAM(S): 17 POWDER, FOR SOLUTION ORAL at 11:13

## 2021-03-24 RX ADMIN — Medication 1 TABLET(S): at 11:13

## 2021-03-24 RX ADMIN — LIDOCAINE 1 PATCH: 4 CREAM TOPICAL at 19:09

## 2021-03-24 RX ADMIN — TRAMADOL HYDROCHLORIDE 50 MILLIGRAM(S): 50 TABLET ORAL at 11:13

## 2021-03-24 RX ADMIN — CHLORHEXIDINE GLUCONATE 1 APPLICATION(S): 213 SOLUTION TOPICAL at 05:31

## 2021-03-24 NOTE — PROGRESS NOTE ADULT - SUBJECTIVE AND OBJECTIVE BOX
DOV HENLEY 70y Female  MRN#: [id number]   Hospital Day: 1d    SUBJECTIVE  Patient is a 70y old Female who presents with a chief complaint of Hip fracture (24 Mar 2021 09:01)  Currently admitted to medicine with the primary diagnosis of     INTERVAL HPI AND OVERNIGHT EVENTS:  Patient was examined and seen at bedside. This morning she is resting comfortably in bed.    REVIEW OF SYMPTOMS:  +"lung pain", SOB with minimal exertion, denies any CP, N/V/D/C,    OBJECTIVE  PAST MEDICAL & SURGICAL HISTORY  Diverticulosis    Gout    OA (osteoarthritis)    H/O total hysterectomy      ALLERGIES:  moxifloxacin (Anaphylaxis (Moderate))    MEDICATIONS:  STANDING MEDICATIONS  ascorbic acid 500 milliGRAM(s) Oral daily  chlorhexidine 4% Liquid 1 Application(s) Topical <User Schedule>  multivitamin 1 Tablet(s) Oral daily  pantoprazole    Tablet 40 milliGRAM(s) Oral before breakfast  polyethylene glycol 3350 17 Gram(s) Oral daily  senna 2 Tablet(s) Oral at bedtime    PRN MEDICATIONS  acetaminophen   Tablet .. 650 milliGRAM(s) Oral every 6 hours PRN  traMADol 50 milliGRAM(s) Oral every 8 hours PRN      VITAL SIGNS: Last 24 Hours  T(C): 35.7 (24 Mar 2021 08:00), Max: 36.8 (23 Mar 2021 13:14)  T(F): 96.2 (24 Mar 2021 08:00), Max: 98.2 (23 Mar 2021 13:14)  HR: 97 (24 Mar 2021 08:00) (97 - 113)  BP: 100/57 (24 Mar 2021 08:00) (100/57 - 140/59)  BP(mean): --  RR: 18 (24 Mar 2021 08:00) (18 - 20)  SpO2: 100% (23 Mar 2021 17:42) (94% - 100%)    LABS:                        8.0    13.13 )-----------( 254      ( 24 Mar 2021 05:20 )             23.2     03-24    130<L>  |  94<L>  |  16  ----------------------------<  90  4.8   |  26  |  <0.5<L>    Ca    9.0      24 Mar 2021 05:20  Phos  3.9     03-24  Mg     1.9     03-24    TPro  5.4<L>  /  Alb  2.7<L>  /  TBili  0.7  /  DBili  x   /  AST  27  /  ALT  9   /  AlkPhos  148<H>  03-24          RADIOLOGY:      PHYSICAL EXAM:  CONSTITUTIONAL: No acute distress, well-developed, AAOx3  HEAD: Atraumatic, normocephalic  EYES: EOMI  ENT: Supple  PULMONARY: Decreased bibasilar breath sounds  CARDIOVASCULAR: Regular rate and rhythm  GASTROINTESTINAL: Soft, non-tender, non-distended  MUSCULOSKELETAL: Chest wall tenderness to palpation  NEUROLOGY: non-focal  SKIN: Ecchymosis surrounding rt hip. Rt hip surgical site covered in gauze    ASSESSMENT & PLAN  70 years old female from home and lives alone with PMHx of OA of bilateral knees, right rotator ruff tear (?), gout, diverticulosis, presented with difficulty ambulation due to worsening pain on hips and shoulders. Pt found to have femoral neck fracture, transferred to [facility] for surgery and since transferred back to [facility] for further oncological workup and PleurX catheter placement    # Right Perihilar Mass w/ large left pleural effusion  # Pulmonary adenoCA with metastasis   - CT Chest No Cont (03.14.21 @ 18:33): Poorly defined malignant right upper lobe mass extending into the right hilum and mediastinum with occlusion of the right upper lobe airways. Metastatic osteolytic right scapular lesion. Acute mildly displaced left glenoid fracture and age-indeterminate T11 compression deformity, underlying pathologic fractures are not excluded. Left greater than right bilateral adrenal nodularity, likely metastatic.  - s/p thoracentesis 3/15 (drained 1L): +ve for malignant cells (adenocarcinoma)   - NM Bone scan showed "Multiple bony abnormalities which by pattern of distribution and correlation with CT findings are consistent with metastatic bone disease.  Metastatic sites  include right and left ilium, right sacroiliac joint, L2, T8, T11 (photopenic) and the right and left femoral intertrochanteric ridges."  - Echo: EF 53%   - Pulmonary following--appreciate recs planned for PleurX catheter placement for pleural effusion  - MRIs ordered for cancer staging  - f/u Heme/Onc recs    # Nondisplaced femoral neck subcapital impaction fracture s/p corrective surgery at [facility]  - Pain control with Tylenol   - CT Pelvis Bony Only No Cont (03.14.21 @ 22:01): Multiple infiltrative bone lesions, largest at the right iliac crest with nondisplaced pathologic fracture and soft tissue mass extension. Findings are compatible with osseous metastasis. Consider nuclear medicine bone scan for the assessment of skeletal metastasis. Nondisplaced right subcapital femoral neck impaction fracture. Consider MRI of the right hip to evaluate extent of disease.  - Patient would like to speak with Oncology at [facility] first before deciding on surgery at [facility]    #Hyponatremia--possibly due to malignancy  - fluid restriction 1 L  - follow up BMP     # Mild hypercalcemia - resolved   - Ca 10.4, albumin 4.4  - Likely secondary to dehydration (patient reports not eating or drinking much because it was difficult for her to get around)  - Continue gentle IVF for now    # Gout  - Not on any medication  - Will avoid red meat diet while inpatient    DVT ppx: Lovenox 40mg qdaily  GI ppx: Protonix  Diet: Regular  Ambulatory status: as tolerated,   Code status: FULL CODE  Dispo: acute    PAST MEDICAL & SURGICAL HISTORY:  Diverticulosis    Gout    OA (osteoarthritis)    H/O total hysterectomy

## 2021-03-24 NOTE — OCCUPATIONAL THERAPY INITIAL EVALUATION ADULT - GENERAL OBSERVATIONS, REHAB EVAL
Pt received and left semifowler in bed, positioned to right. + IV lock, + 7 LPM NCO2 . C/o left shoulder pain 4/10 VAS and back pain 8/10 VAS at rest. Declined repositioning. RN made aware. (Pertinent history copied from RN hand-off.) Pt received and left semifowler in bed, positioned to right. + IV lock, + 7 LPM NCO2 . C/o left shoulder pain 4/10 VAS and back pain 8/10 VAS at rest. Declined repositioning. RN and MD made aware pain impeding function. (Pertinent history copied from RN hand-off.)

## 2021-03-24 NOTE — CONSULT NOTE ADULT - ASSESSMENT
# New diagnosis of metastatic lung adenocarcinoma , to bones/spine , adrenals, liver, pleural effusion.   s/p thoracenthesis - fluid analysis + adenocarcinoma, PDL1 and NGS pending.    # Pathologic fracture of right hip s/p hemiarthroplasty on 3/20/2021 by Dr abdalla .   # Normocytic anemia (baseline around 10-11) , secondary to malignancy - Ferritin >1200 , now with mild worsening possibly from blood loss related to surgery    Plan:  - Complete  workup with MRI head w/wo Pipe, MRI Thoracic & lumbosacral spine noncontrast (to further characterize lesions seen on Bone scan).   - Will speak to the pathologist to follow up on NGS and PDL-1 testing perfomed on pleural fluids  - Will also follow up surgical path from hip surgery  - Based on prior discussion,  patient is strongly against chemotherapy, given her poor functional status.  Would wait for final path to check if she is candidate for targeted therapy or immunotherapy.  - Follow up with pulm for pleurex placement.     Will follow with attending             # New diagnosis of metastatic lung adenocarcinoma , to bones/spine , adrenals, liver, pleural effusion.   s/p thoracenthesis - fluid analysis + adenocarcinoma, PDL1 and NGS pending.    # Pathologic fracture of right hip s/p hemiarthroplasty on 3/20/2021 by Dr abdalla .   # Normocytic anemia (baseline around 10-11) , secondary to malignancy - Ferritin >1200 , now with mild worsening possibly from blood loss related to surgery    Plan:  - Complete  workup with MRI head w/wo Pipe, MRI Thoracic & lumbosacral spine noncontrast (to further characterize lesions seen on Bone scan). The patient is agreeable with MRIs.    - Will speak to the pathologist to follow up on NGS and PDL-1 testing performed on pleural fluids  - Will also follow up surgical path from hip surgery  - Based on prior discussion,  patient is strongly against chemotherapy, given her poor functional status.  Would wait for final path to check if she is candidate for targeted therapy or immunotherapy.  - Follow up with pulm for pleurex placement. Possibly tomorrow    Will follow with attending             # New diagnosis of metastatic lung adenocarcinoma , to bones/spine , adrenals, liver, pleural effusion.   s/p thoracenthesis - fluid analysis + adenocarcinoma, PDL1 and NGS pending.    # Pathologic fracture of right hip s/p hemiarthroplasty on 3/20/2021 by Dr abdalla .   # Normocytic anemia (baseline around 10-11) , secondary to malignancy - Ferritin >1200 , now with mild worsening possibly from blood loss related to surgery    Plan:  - Complete  workup with MRI head w/wo Pipe, MRI Thoracic & lumbosacral spine noncontrast (to further characterize lesions seen on Bone scan). The patient is agreeable with MRIs.    - Will speak to the pathologist to follow up on NGS and PDL-1 testing performed on pleural fluids  - Will also follow up surgical path from hip surgery  - Based on prior discussion,  patient is strongly against chemotherapy, given her poor functional status.  Would wait for final path to check if she is candidate for targeted therapy or immunotherapy.  - Follow up with pulm for pleurex placement. Possibly tomorrow  - Consult radiation oncology for hip radiation, and possible spinal mets radiation?? pending MRI     Case discussed with Dr SONIA Chen

## 2021-03-24 NOTE — CONSULT NOTE ADULT - SUBJECTIVE AND OBJECTIVE BOX
HPI: 70 years old female from home and lives alone with PMHx of OA of bilateral knees, right rotator ruff tear (?), gout, diverticulosis, presented with difficulty ambulation. During her hospitalization , she was diagnosed with lung adenocarcinoma metastatic to the bones, pleural fluid, liver and adrenals ( Pleural fluid was positive for adenocarcinoma, PDL1 and NGS pending) , along with a non displaced fracture of right femoral neck. She was transferred to [facility] and underwent hemiarthroplasty of right hip by Dr Lord on 3/20/2021 and was transferred back to [facility].  Her workup showed also metastatic bone disease to the spine , her pain is fairly controlled.   Today, she is         PAST MEDICAL & SURGICAL HISTORY:  Diverticulosis    Gout    OA (osteoarthritis)    H/O total hysterectomy      Allergies    moxifloxacin (Anaphylaxis (Moderate))    Intolerances    dairy products (Stomach Upset)  lactose (Unknown)  shellfish (Stomach Upset)      HOME MEDICATIONS:  acetaminophen 325 mg oral tablet: 3 tab(s) orally every 8 hours (22 Mar 2021 16:39)  aluminum hydroxide-magnesium hydroxide 200 mg-200 mg/5 mL oral suspension: 30 milliliter(s) orally every 6 hours, As needed, Dyspepsia (22 Mar 2021 16:39)  ascorbic acid 500 mg oral tablet: 1 tab(s) orally once a day (22 Mar 2021 16:39)  bisacodyl 10 mg rectal suppository: 1 suppository(ies) rectal once a day, As needed, If no bowel movement (22 Mar 2021 16:39)  enoxaparin: 40 unit(s) subcutaneous once a day (22 Mar 2021 16:39)  magnesium hydroxide 8% oral suspension: 30 milliliter(s) orally once a day, As needed, Constipation (22 Mar 2021 16:39)  melatonin 3 mg oral tablet: 1 tab(s) orally once a day (at bedtime), As needed, Insomnia (22 Mar 2021 16:39)  Multiple Vitamins oral tablet: 1 tab(s) orally once a day (22 Mar 2021 16:39)  pantoprazole 40 mg oral delayed release tablet: 1 tab(s) orally once a day (before a meal) (22 Mar 2021 16:39)  polyethylene glycol 3350 oral powder for reconstitution: 17 gram(s) orally once a day (22 Mar 2021 16:39)  senna oral tablet: 2 tab(s) orally once a day (at bedtime) (22 Mar 2021 16:39)  traMADol 50 mg oral tablet: 1 tab(s) orally every 6 hours, As needed, Severe Pain (7 - 10) (22 Mar 2021 16:39)  traMADol 50 mg oral tablet: 0.5 tab(s) orally every 6 hours, As needed, Moderate Pain (1 - 6) (22 Mar 2021 16:39)  zinc sulfate 220 mg oral capsule: 1 cap(s) orally once a day (22 Mar 2021 16:39)      Vital Signs Last 24 Hrs  T(C): 35.7 (24 Mar 2021 08:00), Max: 36.8 (23 Mar 2021 13:14)  T(F): 96.2 (24 Mar 2021 08:00), Max: 98.2 (23 Mar 2021 13:14)  HR: 97 (24 Mar 2021 08:00) (97 - 113)  BP: 100/57 (24 Mar 2021 08:00) (100/57 - 140/59)  BP(mean): --  RR: 18 (24 Mar 2021 08:00) (18 - 20)  SpO2: 100% (23 Mar 2021 17:42) (94% - 100%)    PHYSICAL EXAM  General: adult in NAD  HEENT: clear oropharynx, anicteric sclera, pink conjunctiva  Neck: supple  CV: normal S1/S2 with no murmur rubs or gallops  Lungs: positive air movement b/l ant lungs,clear to auscultation, no wheezes, no rales  Abdomen: soft non-tender non-distended, no hepatosplenomegaly  Ext: no clubbing cyanosis or edema  Skin: no rashes and no petechiae  Neuro: alert and oriented X 4, no focal deficits    MEDICATIONS  (STANDING):  ascorbic acid 500 milliGRAM(s) Oral daily  chlorhexidine 4% Liquid 1 Application(s) Topical <User Schedule>  multivitamin 1 Tablet(s) Oral daily  pantoprazole    Tablet 40 milliGRAM(s) Oral before breakfast  polyethylene glycol 3350 17 Gram(s) Oral daily  senna 2 Tablet(s) Oral at bedtime    MEDICATIONS  (PRN):  acetaminophen   Tablet .. 650 milliGRAM(s) Oral every 6 hours PRN Mild Pain (1 - 3)  traMADol 50 milliGRAM(s) Oral every 8 hours PRN Severe Pain (7 - 10)      LABS:                          8.0    13.13 )-----------( 254      ( 24 Mar 2021 05:20 )             23.2         Mean Cell Volume : 94.7 fL  Mean Cell Hemoglobin : 32.7 pg  Mean Cell Hemoglobin Concentration : 34.5 g/dL  Auto Neutrophil # : 10.09 K/uL  Auto Lymphocyte # : 1.64 K/uL  Auto Monocyte # : 0.79 K/uL  Auto Eosinophil # : 0.23 K/uL  Auto Basophil # : 0.06 K/uL  Auto Neutrophil % : 76.8 %  Auto Lymphocyte % : 12.5 %  Auto Monocyte % : 6.0 %  Auto Eosinophil % : 1.8 %  Auto Basophil % : 0.5 %      Serial CBC's  03-24 @ 05:20  Hct-23.2 / Hgb-8.0 / Plat-254 / RBC-2.45 / WBC-13.13  Serial CBC's  03-23 @ 07:25  Hct-25.0 / Hgb-8.3 / Plat-219 / RBC-2.67 / WBC-12.12  Serial CBC's  03-22 @ 11:23  Hct-24.5 / Hgb-8.2 / Plat-197 / RBC-2.67 / WBC-11.56  Serial CBC's  03-21 @ 07:28  Hct-26.3 / Hgb-8.8 / Plat-189 / RBC-2.77 / WBC-8.73  Serial CBC's  03-20 @ 14:41  Hct-31.2 / Hgb-10.1 / Plat-185 / RBC-3.37 / WBC-11.21      03-24    130<L>  |  94<L>  |  16  ----------------------------<  90  4.8   |  26  |  <0.5<L>    Ca    9.0      24 Mar 2021 05:20  Phos  3.9     03-24  Mg     1.9     03-24    TPro  5.4<L>  /  Alb  2.7<L>  /  TBili  0.7  /  DBili  x   /  AST  27  /  ALT  9   /  AlkPhos  148<H>  03-24      PT/INR - ( 22 Mar 2021 11:23 )   PT: 14.6 sec;   INR: 1.28 ratio         PTT - ( 22 Mar 2021 11:23 )  PTT:27.6 sec    Ferritin, Serum: 1273 ng/mL (03-21 @ 07:28)  Iron - Total Binding Capacity.: 175 ug/dL (03-21 @ 07:28)    RADIOLOGY & ADDITIONAL STUDIES:  < from: CT Abdomen and Pelvis w/ Oral Cont and w/ IV Cont (03.18.21 @ 02:58) >  IMPRESSION:  1.  Multiple hepatic lesions and bilateral adrenal gland nodule/masses, suspicious for metastatic disease.    2.  Osseous metastatic disease as described above; nondisplaced subcapital right femoral neck fracture again noted unchanged in appearance since March 14, 2021.    3.  Moderate size right pleural effusion and right basilar atelectasis.      < end of copied text >  < from: NM SPECT Bone Scan, Single Area Single Day (03.15.21 @ 21:28) >  Impression:  Multiple bony abnormalities which by pattern of distribution and correlation with CT findings are consistent with metastatic bone disease.  Metastatic sites  include right and left ilium, right sacroiliac joint, L2, T8, T11 (photopenic) and the right and left femoral intertrochanteric ridges.    Increased uptake in the right 5, 6, 7, 8 anterior ribs lining up one on top of the other in a pattern suggesting rib fractures and in the left sixth anterior rib most likely representing rib fracture      < end of copied text >  < from: CT Chest No Cont (03.14.21 @ 18:33) >    IMPRESSION:    1. Poorly defined malignant right upper lobe mass extending into the right hilum and mediastinum with occlusion of the right upper lobe airways.    2. Metastatic osteolyticright scapular lesion.    3. Acute mildly displaced left glenoid fracture and age-indeterminate T11 compression deformity, underlying pathologic fractures are not excluded.    4. Left greater than right bilateral adrenal nodularity, likely metastatic.        < end of copied text >         HPI: 70 years old female from home and lives alone with PMHx of OA of bilateral knees, right rotator ruff tear (?), gout, diverticulosis, presented with difficulty ambulation. During her hospitalization , she was diagnosed with lung adenocarcinoma metastatic to the bones, pleural fluid, liver and adrenals ( Pleural fluid was positive for adenocarcinoma, PDL1 and NGS pending) , along with a non displaced fracture of right femoral neck. She was transferred to [facility] and underwent hemiarthroplasty of right hip by Dr Lord on 3/20/2021 and was transferred back to [facility].  Her workup showed also metastatic bone disease to the spine , her pain is fairly controlled.   Today, she is feeling better . Her only complaint is back pain . No neurological deficits. She remains in bed, working with OT.       PAST MEDICAL & SURGICAL HISTORY:  Diverticulosis    Gout    OA (osteoarthritis)    H/O total hysterectomy      Allergies    moxifloxacin (Anaphylaxis (Moderate))    Intolerances    dairy products (Stomach Upset)  lactose (Unknown)  shellfish (Stomach Upset)      HOME MEDICATIONS:  acetaminophen 325 mg oral tablet: 3 tab(s) orally every 8 hours (22 Mar 2021 16:39)  aluminum hydroxide-magnesium hydroxide 200 mg-200 mg/5 mL oral suspension: 30 milliliter(s) orally every 6 hours, As needed, Dyspepsia (22 Mar 2021 16:39)  ascorbic acid 500 mg oral tablet: 1 tab(s) orally once a day (22 Mar 2021 16:39)  bisacodyl 10 mg rectal suppository: 1 suppository(ies) rectal once a day, As needed, If no bowel movement (22 Mar 2021 16:39)  enoxaparin: 40 unit(s) subcutaneous once a day (22 Mar 2021 16:39)  magnesium hydroxide 8% oral suspension: 30 milliliter(s) orally once a day, As needed, Constipation (22 Mar 2021 16:39)  melatonin 3 mg oral tablet: 1 tab(s) orally once a day (at bedtime), As needed, Insomnia (22 Mar 2021 16:39)  Multiple Vitamins oral tablet: 1 tab(s) orally once a day (22 Mar 2021 16:39)  pantoprazole 40 mg oral delayed release tablet: 1 tab(s) orally once a day (before a meal) (22 Mar 2021 16:39)  polyethylene glycol 3350 oral powder for reconstitution: 17 gram(s) orally once a day (22 Mar 2021 16:39)  senna oral tablet: 2 tab(s) orally once a day (at bedtime) (22 Mar 2021 16:39)  traMADol 50 mg oral tablet: 1 tab(s) orally every 6 hours, As needed, Severe Pain (7 - 10) (22 Mar 2021 16:39)  traMADol 50 mg oral tablet: 0.5 tab(s) orally every 6 hours, As needed, Moderate Pain (1 - 6) (22 Mar 2021 16:39)  zinc sulfate 220 mg oral capsule: 1 cap(s) orally once a day (22 Mar 2021 16:39)      Vital Signs Last 24 Hrs  T(C): 35.7 (24 Mar 2021 08:00), Max: 36.8 (23 Mar 2021 13:14)  T(F): 96.2 (24 Mar 2021 08:00), Max: 98.2 (23 Mar 2021 13:14)  HR: 97 (24 Mar 2021 08:00) (97 - 113)  BP: 100/57 (24 Mar 2021 08:00) (100/57 - 140/59)  BP(mean): --  RR: 18 (24 Mar 2021 08:00) (18 - 20)  SpO2: 100% (23 Mar 2021 17:42) (94% - 100%)    PHYSICAL EXAM  General: adult in mild respiratory distress  HEENT: clear oropharynx, anicteric sclera, pink conjunctiva  Neck: supple  CV: normal S1/S2 with no murmur rubs or gallops  Lungs: Limited exam due to position , decreased air entry  Abdomen: soft non-tender  Ext:+ edema  Skin: no rashes and no petechiae      MEDICATIONS  (STANDING):  ascorbic acid 500 milliGRAM(s) Oral daily  chlorhexidine 4% Liquid 1 Application(s) Topical <User Schedule>  multivitamin 1 Tablet(s) Oral daily  pantoprazole    Tablet 40 milliGRAM(s) Oral before breakfast  polyethylene glycol 3350 17 Gram(s) Oral daily  senna 2 Tablet(s) Oral at bedtime    MEDICATIONS  (PRN):  acetaminophen   Tablet .. 650 milliGRAM(s) Oral every 6 hours PRN Mild Pain (1 - 3)  traMADol 50 milliGRAM(s) Oral every 8 hours PRN Severe Pain (7 - 10)      LABS:                          8.0    13.13 )-----------( 254      ( 24 Mar 2021 05:20 )             23.2         Mean Cell Volume : 94.7 fL  Mean Cell Hemoglobin : 32.7 pg  Mean Cell Hemoglobin Concentration : 34.5 g/dL  Auto Neutrophil # : 10.09 K/uL  Auto Lymphocyte # : 1.64 K/uL  Auto Monocyte # : 0.79 K/uL  Auto Eosinophil # : 0.23 K/uL  Auto Basophil # : 0.06 K/uL  Auto Neutrophil % : 76.8 %  Auto Lymphocyte % : 12.5 %  Auto Monocyte % : 6.0 %  Auto Eosinophil % : 1.8 %  Auto Basophil % : 0.5 %      Serial CBC's  03-24 @ 05:20  Hct-23.2 / Hgb-8.0 / Plat-254 / RBC-2.45 / WBC-13.13  Serial CBC's  03-23 @ 07:25  Hct-25.0 / Hgb-8.3 / Plat-219 / RBC-2.67 / WBC-12.12  Serial CBC's  03-22 @ 11:23  Hct-24.5 / Hgb-8.2 / Plat-197 / RBC-2.67 / WBC-11.56  Serial CBC's  03-21 @ 07:28  Hct-26.3 / Hgb-8.8 / Plat-189 / RBC-2.77 / WBC-8.73  Serial CBC's  03-20 @ 14:41  Hct-31.2 / Hgb-10.1 / Plat-185 / RBC-3.37 / WBC-11.21      03-24    130<L>  |  94<L>  |  16  ----------------------------<  90  4.8   |  26  |  <0.5<L>    Ca    9.0      24 Mar 2021 05:20  Phos  3.9     03-24  Mg     1.9     03-24    TPro  5.4<L>  /  Alb  2.7<L>  /  TBili  0.7  /  DBili  x   /  AST  27  /  ALT  9   /  AlkPhos  148<H>  03-24      PT/INR - ( 22 Mar 2021 11:23 )   PT: 14.6 sec;   INR: 1.28 ratio         PTT - ( 22 Mar 2021 11:23 )  PTT:27.6 sec    Ferritin, Serum: 1273 ng/mL (03-21 @ 07:28)  Iron - Total Binding Capacity.: 175 ug/dL (03-21 @ 07:28)    RADIOLOGY & ADDITIONAL STUDIES:  < from: CT Abdomen and Pelvis w/ Oral Cont and w/ IV Cont (03.18.21 @ 02:58) >  IMPRESSION:  1.  Multiple hepatic lesions and bilateral adrenal gland nodule/masses, suspicious for metastatic disease.    2.  Osseous metastatic disease as described above; nondisplaced subcapital right femoral neck fracture again noted unchanged in appearance since March 14, 2021.    3.  Moderate size right pleural effusion and right basilar atelectasis.      < end of copied text >  < from: NM SPECT Bone Scan, Single Area Single Day (03.15.21 @ 21:28) >  Impression:  Multiple bony abnormalities which by pattern of distribution and correlation with CT findings are consistent with metastatic bone disease.  Metastatic sites  include right and left ilium, right sacroiliac joint, L2, T8, T11 (photopenic) and the right and left femoral intertrochanteric ridges.    Increased uptake in the right 5, 6, 7, 8 anterior ribs lining up one on top of the other in a pattern suggesting rib fractures and in the left sixth anterior rib most likely representing rib fracture      < end of copied text >  < from: CT Chest No Cont (03.14.21 @ 18:33) >    IMPRESSION:    1. Poorly defined malignant right upper lobe mass extending into the right hilum and mediastinum with occlusion of the right upper lobe airways.    2. Metastatic osteolyticright scapular lesion.    3. Acute mildly displaced left glenoid fracture and age-indeterminate T11 compression deformity, underlying pathologic fractures are not excluded.    4. Left greater than right bilateral adrenal nodularity, likely metastatic.        < end of copied text >

## 2021-03-24 NOTE — PHYSICAL THERAPY INITIAL EVALUATION ADULT - GENERAL OBSERVATIONS, REHAB EVAL
10:30-11:15 pt encountered in  bed cooperative, + O2 via nasal canula, 7L/min.  Dressing on right lateral thigh is wet with sanguinous fluid, RN made aware.

## 2021-03-24 NOTE — PROGRESS NOTE ADULT - SUBJECTIVE AND OBJECTIVE BOX
Patient is a 70y old  Female who presents with a chief complaint of       SUBJECTIVE:  Thoracentesis performed at [facility] by me on 03/15 was positive for adenocarcinoma. Patient was transferred to [facility] for fem neck fx surgery on 3/20. Plan was to place pleurx catheter there but patient was in pain and there was no urgent need for procedure. Patient also needd rehab. Patient was then transferred back to [facility].       PHYSICAL EXAM  Vital Signs Last 24 Hrs  T(C): 35.8 (24 Mar 2021 00:12), Max: 36.8 (23 Mar 2021 13:14)  T(F): 96.4 (24 Mar 2021 00:12), Max: 98.2 (23 Mar 2021 13:14)  HR: 101 (24 Mar 2021 00:12) (97 - 113)  BP: 138/74 (24 Mar 2021 00:12) (118/99 - 140/59)  RR: 18 (24 Mar 2021 00:12) (18 - 20)  SpO2: 100% (23 Mar 2021 17:42) (94% - 100%)    CONSTITUTIONAL:   Elderly. Well nourished.  NAD    ENT:   Airway patent,   No thrush    EYES:   Clear bilaterally,   pupils equal, round and reactive to light.    CARDIAC:   Normal rate,   regular rhythm.    no edema    CAROTID:   normal systolic impulse  no bruits    RESPIRATORY:   Inspection- normal chest wall symmetry  Palpation- normal chest wall expansion  Auscultation- decreased breath sounds on right  No wheezing  Normal chest expansion  Not tachypneic,  No use of accessory muscles    GASTROINTESTINAL:  Abdomen soft,   non-tender,   no guarding,   + BS    MUSCULOSKELETAL:   range of motion is not limited,  no clubbing, cyanosis    NEUROLOGICAL:   AOx4  Follows commands  Moves all extremities   Alert and oriented   no motor  deficits.    SKIN:   Skin normal color for race,   No evidence of rash.    PSYCHIATRIC:   normal mood and affect.   no apparent risk to self or others.        LABS:                          8.0    13.13 )-----------( 254      ( 24 Mar 2021 05:20 )             23.2                                               03-24    130<L>  |  94<L>  |  16  ----------------------------<  90  4.8   |  26  |  <0.5<L>    Ca    9.0      24 Mar 2021 05:20  Phos  3.9     03-24  Mg     1.9     03-24    TPro  5.4<L>  /  Alb  2.7<L>  /  TBili  0.7  /  DBili  x   /  AST  27  /  ALT  9   /  AlkPhos  148<H>  03-24      PT/INR - ( 22 Mar 2021 11:23 )   PT: 14.6 sec;   INR: 1.28 ratio         PTT - ( 22 Mar 2021 11:23 )  PTT:27.6 sec                                                                                     LIVER FUNCTIONS - ( 24 Mar 2021 05:20 )  Alb: 2.7 g/dL / Pro: 5.4 g/dL / ALK PHOS: 148 U/L / ALT: 9 U/L / AST: 27 U/L / GGT: x                                                                                             MEDICATIONS  (STANDING):  ascorbic acid 500 milliGRAM(s) Oral daily  chlorhexidine 4% Liquid 1 Application(s) Topical <User Schedule>  multivitamin 1 Tablet(s) Oral daily  pantoprazole    Tablet 40 milliGRAM(s) Oral before breakfast  polyethylene glycol 3350 17 Gram(s) Oral daily  senna 2 Tablet(s) Oral at bedtime    MEDICATIONS  (PRN):  acetaminophen   Tablet .. 650 milliGRAM(s) Oral every 6 hours PRN Mild Pain (1 - 3)  traMADol 50 milliGRAM(s) Oral every 8 hours PRN Severe Pain (7 - 10)      X-Rays reviewed    CXR interpreted by me: right sided pleural mass and right lung mass Patient is a 70y old  Female who presents with a chief complaint of SOB      SUBJECTIVE:  Thoracentesis performed at [facility] on 03/15 which was positive for adenocarcinoma. Patient was transferred to [facility] for fem neck fx surgery on 3/20. Plan was to place pleurx catheter there but patient was in pain and there was no urgent need for procedure. Patient also needed rehab. Patient was then transferred back to [facility].   CXR worsening R pleural effusion.  Called to evaluate.    PHYSICAL EXAM  Vital Signs Last 24 Hrs  T(C): 35.8 (24 Mar 2021 00:12), Max: 36.8 (23 Mar 2021 13:14)  T(F): 96.4 (24 Mar 2021 00:12), Max: 98.2 (23 Mar 2021 13:14)  HR: 101 (24 Mar 2021 00:12) (97 - 113)  BP: 138/74 (24 Mar 2021 00:12) (118/99 - 140/59)  RR: 18 (24 Mar 2021 00:12) (18 - 20)  SpO2: 100% (23 Mar 2021 17:42) (94% - 100%)    CONSTITUTIONAL:   Elderly. Cachectic and ill appearing.  ENT:   Airway patent,   No thrush    EYES:   Clear bilaterally,   pupils equal, round and reactive to light.    CARDIAC:   Normal rate,   regular rhythm.    LE swelling 1+    RESPIRATORY:   Decreased BS R    GASTROINTESTINAL:  Abdomen soft,   non-tender,   no guarding,   + BS    NEUROLOGICAL:   AOx4  Follows commands  Alert and oriented     SKIN:   Skin normal color for race,   No evidence of rash.          LABS:                          8.0    13.13 )-----------( 254      ( 24 Mar 2021 05:20 )             23.2                                               03-24    130<L>  |  94<L>  |  16  ----------------------------<  90  4.8   |  26  |  <0.5<L>    Ca    9.0      24 Mar 2021 05:20  Phos  3.9     03-24  Mg     1.9     03-24    TPro  5.4<L>  /  Alb  2.7<L>  /  TBili  0.7  /  DBili  x   /  AST  27  /  ALT  9   /  AlkPhos  148<H>  03-24      PT/INR - ( 22 Mar 2021 11:23 )   PT: 14.6 sec;   INR: 1.28 ratio         PTT - ( 22 Mar 2021 11:23 )  PTT:27.6 sec                                                                                     LIVER FUNCTIONS - ( 24 Mar 2021 05:20 )  Alb: 2.7 g/dL / Pro: 5.4 g/dL / ALK PHOS: 148 U/L / ALT: 9 U/L / AST: 27 U/L / GGT: x                                                                                             MEDICATIONS  (STANDING):  ascorbic acid 500 milliGRAM(s) Oral daily  chlorhexidine 4% Liquid 1 Application(s) Topical <User Schedule>  multivitamin 1 Tablet(s) Oral daily  pantoprazole    Tablet 40 milliGRAM(s) Oral before breakfast  polyethylene glycol 3350 17 Gram(s) Oral daily  senna 2 Tablet(s) Oral at bedtime    MEDICATIONS  (PRN):  acetaminophen   Tablet .. 650 milliGRAM(s) Oral every 6 hours PRN Mild Pain (1 - 3)  traMADol 50 milliGRAM(s) Oral every 8 hours PRN Severe Pain (7 - 10)      X-Rays reviewed    CXR interpreted by me: right sided pleural mass and right lung mass

## 2021-03-24 NOTE — OCCUPATIONAL THERAPY INITIAL EVALUATION ADULT - PERTINENT HX OF CURRENT PROBLEM, REHAB EVAL
69 yo female s/p R hip hemiarthroplasty. Pt states feeling ill last few months with loss of appetite/weight. Pt last walked independently Nov/Dec 2020. Has had progressive back pain and now walks with RW. Pt presented to [facility] on 3/13/21 where XR revealed non displaced Fn fx, R scapula osteolytic lesion, L glenoid fx, T11 compression deformity. Pt s/p thoracoscentesis + for malignant cells. BS reveals metastatic disease to ribs, B/L Ilium, R SI joint, B/L IT ridges. TTE 3/16-EF 53%.

## 2021-03-24 NOTE — PHYSICAL THERAPY INITIAL EVALUATION ADULT - LEVEL OF INDEPENDENCE: SUPINE/SIT, REHAB EVAL
upon sitting pt reported pain left flank and difficulty breathing.  SPO2 in sitting was 86%. pt returned to bed/moderate assist (50% patients effort)

## 2021-03-24 NOTE — OCCUPATIONAL THERAPY INITIAL EVALUATION ADULT - HOME MANAGEMENT SKILLS, PREVIOUS LEVEL OF FUNCTION, OT EVAL
As per pt report, last 3 months too tired  to cook and clean. Ate whatever could find in fridge/cabinets.

## 2021-03-24 NOTE — PHYSICAL THERAPY INITIAL EVALUATION ADULT - SITTING BALANCE: STATIC
pt was not able to maintain sitting blance due to leaning to right side 2* left flank pain./poor plus

## 2021-03-24 NOTE — PROGRESS NOTE ADULT - ASSESSMENT
Impression  Stage IV Lung Adenocarcinoma with RUL mass extending into right hilum  Large right malignant pleural effusion s/p thora, positive for adenocarcinoma  Bone mets    Recommendations  Pleurx catheter placement to be planned  Repeat CXR  Incentive spirometry  HOB at 45  Aspiration precautions  DVT ppx  Overall poor prognosis

## 2021-03-25 ENCOUNTER — RESULT REVIEW (OUTPATIENT)
Age: 71
End: 2021-03-25

## 2021-03-25 PROCEDURE — 32550 INSERT PLEURAL CATH: CPT

## 2021-03-25 PROCEDURE — 72158 MRI LUMBAR SPINE W/O & W/DYE: CPT | Mod: 26

## 2021-03-25 PROCEDURE — 88312 SPECIAL STAINS GROUP 1: CPT | Mod: 26

## 2021-03-25 PROCEDURE — 88305 TISSUE EXAM BY PATHOLOGIST: CPT | Mod: 26

## 2021-03-25 PROCEDURE — 99233 SBSQ HOSP IP/OBS HIGH 50: CPT

## 2021-03-25 PROCEDURE — 70553 MRI BRAIN STEM W/O & W/DYE: CPT | Mod: 26

## 2021-03-25 PROCEDURE — 88112 CYTOPATH CELL ENHANCE TECH: CPT | Mod: 26

## 2021-03-25 PROCEDURE — 71045 X-RAY EXAM CHEST 1 VIEW: CPT | Mod: 26

## 2021-03-25 PROCEDURE — 71045 X-RAY EXAM CHEST 1 VIEW: CPT | Mod: 26,77

## 2021-03-25 PROCEDURE — 72157 MRI CHEST SPINE W/O & W/DYE: CPT | Mod: 26

## 2021-03-25 PROCEDURE — 93010 ELECTROCARDIOGRAM REPORT: CPT

## 2021-03-25 RX ORDER — MORPHINE SULFATE 50 MG/1
2 CAPSULE, EXTENDED RELEASE ORAL ONCE
Refills: 0 | Status: DISCONTINUED | OUTPATIENT
Start: 2021-03-25 | End: 2021-03-25

## 2021-03-25 RX ORDER — ENOXAPARIN SODIUM 100 MG/ML
40 INJECTION SUBCUTANEOUS DAILY
Refills: 0 | Status: DISCONTINUED | OUTPATIENT
Start: 2021-03-25 | End: 2021-03-29

## 2021-03-25 RX ADMIN — Medication 1 TABLET(S): at 11:12

## 2021-03-25 RX ADMIN — ENOXAPARIN SODIUM 40 MILLIGRAM(S): 100 INJECTION SUBCUTANEOUS at 11:13

## 2021-03-25 RX ADMIN — POLYETHYLENE GLYCOL 3350 17 GRAM(S): 17 POWDER, FOR SOLUTION ORAL at 11:14

## 2021-03-25 RX ADMIN — Medication 1 MILLIGRAM(S): at 18:20

## 2021-03-25 RX ADMIN — TRAMADOL HYDROCHLORIDE 50 MILLIGRAM(S): 50 TABLET ORAL at 04:08

## 2021-03-25 RX ADMIN — MORPHINE SULFATE 2 MILLIGRAM(S): 50 CAPSULE, EXTENDED RELEASE ORAL at 22:53

## 2021-03-25 RX ADMIN — Medication 500 MILLIGRAM(S): at 11:12

## 2021-03-25 RX ADMIN — MORPHINE SULFATE 2 MILLIGRAM(S): 50 CAPSULE, EXTENDED RELEASE ORAL at 13:05

## 2021-03-25 RX ADMIN — MORPHINE SULFATE 2 MILLIGRAM(S): 50 CAPSULE, EXTENDED RELEASE ORAL at 13:37

## 2021-03-25 RX ADMIN — CHLORHEXIDINE GLUCONATE 1 APPLICATION(S): 213 SOLUTION TOPICAL at 05:38

## 2021-03-25 RX ADMIN — PANTOPRAZOLE SODIUM 40 MILLIGRAM(S): 20 TABLET, DELAYED RELEASE ORAL at 05:39

## 2021-03-25 RX ADMIN — TRAMADOL HYDROCHLORIDE 50 MILLIGRAM(S): 50 TABLET ORAL at 18:20

## 2021-03-25 NOTE — CONSULT NOTE ADULT - ASSESSMENT
# New diagnosis of metastatic lung adenocarcinoma , to bones/spine , adrenals, liver, pleural effusion.   s/p thoracenthesis - fluid analysis + adenocarcinoma, PDL1 and NGS pending.    # Pathologic fracture of right hip s/p hemiarthroplasty on 3/20/2021 by Dr abdalla .   Radiation Oncology c/s - evaluation for hip radiation, and possible spinal mets radiation?? pending MRI     Plan:  - Agree with -Complete  workup with MRI head w/wo Pipe, MRI Thoracic & lumbosacral spine noncontrast (to further characterize lesions seen on Bone scan). The patient is agreeable with MRIs.    - follow up surgical path from hip surgery  -It's noted,  patient is strongly against chemotherapy, given her poor functional status.  Would wait for final path to check if she is candidate for targeted therapy or immunotherapy.  - pulm for pleurex placement. Possibly tomorrow    Case discussed with Mimi Dickerson/Jeanne             # New diagnosis of metastatic lung adenocarcinoma , to bones/spine , adrenals, liver, pleural effusion.   s/p thoracenthesis - fluid analysis + adenocarcinoma, PDL1 and NGS pending.   pleurex placement - done 23/25/2021  # Pathologic fracture of right hip s/p hemiarthroplasty on 3/20/2021 by Dr abdalla .   Radiation Oncology c/s - evaluation for hip radiation, and possible spinal mets radiation?? pending MRI     Plan:  Agree with -Complete  workup with MRI head w/wo Pipe, MRI Thoracic & lumbosacral spine noncontrast (to further characterize lesions seen on Bone scan). The patient is agreeable with MRIs.    Follow up surgical path from hip surgery  It's noted,  patient is strongly against chemotherapy, given her poor functional status.  Would wait for final path to check if she is candidate for targeted therapy or immunotherapy.  Case discussed with Mimi Dickerson/Jeanne              New diagnosis of metastatic lung adenocarcinoma , to bones/spine , adrenals, liver, pleural effusion.   s/p thoracenthesis - fluid analysis + adenocarcinoma, PDL1 and NGS pending.   pleurex placement - done 3/25/2021   Pathologic fracture of right hip s/p hemiarthroplasty on 3/20/2021 by Dr abdalla .       Radiation Oncology c/s - evaluation for palliative hip radiation, and possible spinal mets radiation    Plan:  Agree with -Complete  workup with MRI head w/wo Pipe, MRI Thoracic & lumbosacral spine noncontrast (to further characterize lesions seen on Bone scan).    Follow up surgical path from hip surgery- pending  It's noted,  patient is strongly against chemotherapy, given her poor functional status.  Would wait for final path to check if she is candidate for targeted therapy or immunotherapy.  Case discussed with Dr. Le    Patient seen at bedside: Patient had just returned from pleurex placement, on 100% NRB face mask, and sleepy.  Will return to bedside to speak with pt.                 New diagnosis of metastatic lung adenocarcinoma , to bones/spine , adrenals, liver, pleural effusion.   s/p thoracenthesis - fluid analysis + adenocarcinoma, PDL1 and NGS pending.   pleurex placement - done 3/25/2021   Pathologic fracture of right hip s/p hemiarthroplasty on 3/20/2021 by Dr abdalla .     Patient seen at bedside: Patient had just returned from pleurex placement, on 100% NRB face mask, and sleepy. Unable to speak with patient.   Radiation Oncology c/s - evaluation for palliative hip radiation, and possible spinal mets radiation    Plan:  Case discussed with Dr. Le  Agree with -Complete  workup with MRI head w/wo Pipe, MRI Thoracic & lumbosacral spine noncontrast (to further characterize lesions seen on Bone scan).    Follow up surgical path from hip surgery- pending  Patient to f/u as out-patient, with radiation oncology in  3-4 weeks s/p recovery- wound needs time to heal prior to radiation.   Patient to f/u with rehab, as indicated

## 2021-03-25 NOTE — PROGRESS NOTE ADULT - SUBJECTIVE AND OBJECTIVE BOX
DOV HENLEY 70y Female  MRN#: [id number]   Hospital Day: 2d    SUBJECTIVE  Patient is a 70y old Female who presents with a chief complaint of Pathological fx (24 Mar 2021 13:08)  Currently admitted to medicine with the primary diagnosis of     INTERVAL HPI AND OVERNIGHT EVENTS:  Patient was examined and seen at bedside. This morning she is resting comfortably in bed.  o/n: Pt desaturated to 3L NC to 78% with tachycardia--given vte ppx and CT chest ordered with PE protocol not performed, CXR ordered and performed.    REVIEW OF SYMPTOMS:  +fatigue, abd pain, "lung pain", weakness, back pain  Denies any chest pain    OBJECTIVE  PAST MEDICAL & SURGICAL HISTORY  Diverticulosis    Gout    OA (osteoarthritis)    H/O total hysterectomy      ALLERGIES:  moxifloxacin (Anaphylaxis (Moderate))    MEDICATIONS:  STANDING MEDICATIONS  ascorbic acid 500 milliGRAM(s) Oral daily  chlorhexidine 4% Liquid 1 Application(s) Topical <User Schedule>  enoxaparin Injectable 40 milliGRAM(s) SubCutaneous daily  LORazepam   Injectable 1 milliGRAM(s) IV Push once  multivitamin 1 Tablet(s) Oral daily  pantoprazole    Tablet 40 milliGRAM(s) Oral before breakfast  polyethylene glycol 3350 17 Gram(s) Oral daily  senna 2 Tablet(s) Oral at bedtime    PRN MEDICATIONS  acetaminophen   Tablet .. 650 milliGRAM(s) Oral every 6 hours PRN  lidocaine   Patch 1 Patch Transdermal daily PRN  traMADol 50 milliGRAM(s) Oral every 8 hours PRN      VITAL SIGNS: Last 24 Hours  T(C): 36.8 (25 Mar 2021 08:00), Max: 36.8 (25 Mar 2021 08:00)  T(F): 98.2 (25 Mar 2021 08:00), Max: 98.2 (25 Mar 2021 08:00)  HR: 107 (25 Mar 2021 08:00) (107 - 120)  BP: 116/57 (25 Mar 2021 08:00) (116/57 - 123/59)  BP(mean): --  RR: 18 (25 Mar 2021 08:00) (18 - 18)  SpO2: 98% (25 Mar 2021 08:00) (98% - 99%)    LABS:                        8.0    13.13 )-----------( 254      ( 24 Mar 2021 05:20 )             23.2     03-24    130<L>  |  94<L>  |  16  ----------------------------<  90  4.8   |  26  |  <0.5<L>    Ca    9.0      24 Mar 2021 05:20  Phos  3.9     03-24  Mg     1.9     03-24    TPro  5.4<L>  /  Alb  2.7<L>  /  TBili  0.7  /  DBili  x   /  AST  27  /  ALT  9   /  AlkPhos  148<H>  03-24          RADIOLOGY:  < from: Xray Chest 1 View-PORTABLE IMMEDIATE (Xray Chest 1 View-PORTABLE IMMEDIATE .) (03.25.21 @ 06:01) >  Comparison : Chest radiograph 4 days prior.    Technique/Positioning: Frontal portable.    Findings:    Support devices: None.    Cardiac/mediastinum/hilum: Unremarkable.    Lung parenchyma/Pleura: Worsening opacification of the right hemithorax.    Skeleton/soft tissues: Unremarkable.    Impression:    Worsening opacification of the right hemithorax.    < end of copied text >      PHYSICAL EXAM:  CONSTITUTIONAL: No acute distress, ill appearing, well-developed, AAOx3  HEAD: Atraumatic, normocephalic  EYES: EOMI  ENT: Supple  PULMONARY: Markedly diminished R sided breath sounds  CARDIOVASCULAR: Regular rate and rhythm  GASTROINTESTINAL: Soft, non-tender, non-distended  MUSCULOSKELETAL: Chest wall tenderness to palpation  NEUROLOGY: non-focal  SKIN: Ecchymosis surrounding rt hip. Rt hip surgical site covered in gauze    ASSESSMENT & PLAN  70 years old female from home and lives alone with PMHx of OA of bilateral knees, right rotator ruff tear (?), gout, diverticulosis, presented with difficulty ambulation due to worsening pain on hips and shoulders. Pt found to have femoral neck fracture, transferred to [facility] for surgery and since transferred back to [facility] for further oncological workup and PleurX catheter placement    # Right Perihilar Mass w/ large left pleural effusion  # Pulmonary adenoCA with metastasis   - CT Chest No Cont (03.14.21 @ 18:33): Poorly defined malignant right upper lobe mass extending into the right hilum and mediastinum with occlusion of the right upper lobe airways. Metastatic osteolytic right scapular lesion. Acute mildly displaced left glenoid fracture and age-indeterminate T11 compression deformity, underlying pathologic fractures are not excluded. Left greater than right bilateral adrenal nodularity, likely metastatic.  - s/p thoracentesis 3/15 (drained 1L): +ve for malignant cells (adenocarcinoma)   - NM Bone scan showed "Multiple bony abnormalities which by pattern of distribution and correlation with CT findings are consistent with metastatic bone disease.  Metastatic sites  include right and left ilium, right sacroiliac joint, L2, T8, T11 (photopenic) and the right and left femoral intertrochanteric ridges."  - Echo: EF 53%   - Pulmonary following--appreciate recs planned for PleurX catheter placement today  - Spoke with pulm--no need for CT chest at present time, SOB likely d/t large Rt effusion. Pt states her SOB has been constant throughout day without any discernable change later in evening  - MRIs ordered for cancer staging (pt apparently refused, but is now amenable)  - f/u Heme/Onc recs    # Nondisplaced femoral neck subcapital impaction fracture s/p corrective surgery at [facility]  - Pain control with Tylenol   - CT Pelvis Bony Only No Cont (03.14.21 @ 22:01): Multiple infiltrative bone lesions, largest at the right iliac crest with nondisplaced pathologic fracture and soft tissue mass extension. Findings are compatible with osseous metastasis. Consider nuclear medicine bone scan for the assessment of skeletal metastasis. Nondisplaced right subcapital femoral neck impaction fracture. Consider MRI of the right hip to evaluate extent of disease.  - Ortho following--appreciate recs    #Hyponatremia--possibly due to malignancy  - fluid restriction 1 L  - follow up BMP     # Mild hypercalcemia - resolved   - Ca 10.4, albumin 4.4  - Likely secondary to dehydration (patient reports not eating or drinking much because it was difficult for her to get around)  - Continue gentle IVF for now    # Gout  - Not on any medication  - Will avoid red meat diet while inpatient    DVT ppx: Lovenox 40mg qdaily  GI ppx: Protonix  Diet: Regular  Ambulatory status: as tolerated,   Code status: FULL CODE  Dispo: acute    PAST MEDICAL & SURGICAL HISTORY:  Diverticulosis    Gout    OA (osteoarthritis)    H/O total hysterectomy

## 2021-03-25 NOTE — CHART NOTE - NSCHARTNOTEFT_GEN_A_CORE
Pleurx catheter placement was done today. Consent was signed and witnessed by sister. Repeat CXR is pending. 1 liter of fluid was removed during the procedure. Pleurx catheter placement was done today. Consent was signed and witnessed by sister. Repeat CXR is pending. 1 liter of fluid was removed during the procedure.    procedure note      patient was placed on LL decu, fluid was localized by us, skin was cleaned with betadine, 4 CC 1% lid was used to anesth the skin, 18 aj needle introduced, serasan fluid, guide wire introduced, needle withdrawn, usind seldinger technique pleurx catheter was placed     I was present throughout the procedure

## 2021-03-25 NOTE — PROGRESS NOTE ADULT - SUBJECTIVE AND OBJECTIVE BOX
Patient is a 70y old  Female who presents with a chief complaint of Lung Cancer (25 Mar 2021 12:19)        SUBJECTIVE:        PHYSICAL EXAM  Vital Signs Last 24 Hrs  T(C): 36.8 (25 Mar 2021 08:00), Max: 36.8 (25 Mar 2021 08:00)  T(F): 98.2 (25 Mar 2021 08:00), Max: 98.2 (25 Mar 2021 08:00)  HR: 107 (25 Mar 2021 08:00) (107 - 120)  BP: 116/57 (25 Mar 2021 08:00) (116/57 - 123/59)  BP(mean): --  RR: 18 (25 Mar 2021 08:00) (18 - 18)  SpO2: 98% (25 Mar 2021 08:00) (98% - 99%)    CONSTITUTIONAL:   Well nourished.  NAD    ENT:   Airway patent,   No thrush    EYES:   Clear bilaterally,   pupils equal, round and reactive to light.    CARDIAC:   Normal rate,   regular rhythm.    no edema      CAROTID:   normal systolic impulse  no bruits    RESPIRATORY:   No wheezing  Normal chest expansion  Not tachypneic,  Nno use of accessory muscles    GASTROINTESTINAL:  Abdomen soft,   non-tender,   no guarding,   + BS    GENITOURINARY  normal genitalia for sex  no edema    MUSCULOSKELETAL:   range of motion is not limited,  no clubbing, cyanosis    NEUROLOGICAL:   Alert and oriented   no motor  deficits.    SKIN:   Skin normal color for race,   No evidence of rash.    PSYCHIATRIC:   normal mood and affect.   no apparent risk to self or others.    HEME LYMPH:   No cervical  lymphadenopathy.  no inguinal lymphadenopathy      03-24-21 @ 07:01  -  03-25-21 @ 07:00  --------------------------------------------------------  IN:  Total IN: 0 mL    OUT:    Voided (mL): 300 mL  Total OUT: 300 mL    Total NET: -300 mL          LABS:                          8.0    13.13 )-----------( 254      ( 24 Mar 2021 05:20 )             23.2                                               03-24    130<L>  |  94<L>  |  16  ----------------------------<  90  4.8   |  26  |  <0.5<L>    Ca    9.0      24 Mar 2021 05:20  Phos  3.9     03-24  Mg     1.9     03-24    TPro  5.4<L>  /  Alb  2.7<L>  /  TBili  0.7  /  DBili  x   /  AST  27  /  ALT  9   /  AlkPhos  148<H>  03-24                                                                                           LIVER FUNCTIONS - ( 24 Mar 2021 05:20 )  Alb: 2.7 g/dL / Pro: 5.4 g/dL / ALK PHOS: 148 U/L / ALT: 9 U/L / AST: 27 U/L / GGT: x                                                                                                MEDICATIONS  (STANDING):  ascorbic acid 500 milliGRAM(s) Oral daily  chlorhexidine 4% Liquid 1 Application(s) Topical <User Schedule>  enoxaparin Injectable 40 milliGRAM(s) SubCutaneous daily  LORazepam   Injectable 1 milliGRAM(s) IV Push once  morphine  - Injectable 2 milliGRAM(s) IV Push once  multivitamin 1 Tablet(s) Oral daily  pantoprazole    Tablet 40 milliGRAM(s) Oral before breakfast  polyethylene glycol 3350 17 Gram(s) Oral daily  senna 2 Tablet(s) Oral at bedtime    MEDICATIONS  (PRN):  acetaminophen   Tablet .. 650 milliGRAM(s) Oral every 6 hours PRN Mild Pain (1 - 3)  lidocaine   Patch 1 Patch Transdermal daily PRN Back pain  traMADol 50 milliGRAM(s) Oral every 8 hours PRN Severe Pain (7 - 10)      X-Rays reviewed    CXR interpreted by me: Patient is a 70y old  Female who presents with a chief complaint of Lung Cancer (25 Mar 2021 12:19)        SUBJECTIVE: on 100% NRM, co l shoulder pain        PHYSICAL EXAM  Vital Signs Last 24 Hrs  T(C): 36.8 (25 Mar 2021 08:00), Max: 36.8 (25 Mar 2021 08:00)  T(F): 98.2 (25 Mar 2021 08:00), Max: 98.2 (25 Mar 2021 08:00)  HR: 107 (25 Mar 2021 08:00) (107 - 120)  BP: 116/57 (25 Mar 2021 08:00) (116/57 - 123/59)  BP(mean): --  RR: 18 (25 Mar 2021 08:00) (18 - 18)  SpO2: 98% (25 Mar 2021 08:00) (98% - 99%)    CONSTITUTIONAL:  cachectic, ill looking    ENT:   Airway patent,   No thrush    EYES:   Clear bilaterally,   pupils equal, round and reactive to light.    CARDIAC:   Normal rate,   regular rhythm.    no edema        RESPIRATORY:   no air entry r side    Abdomen soft,   non-tender,   no guarding,   + BS      MUSCULOSKELETAL:   r hip scar    NEUROLOGICAL:   Alert and oriented   no motor  deficits.            03-24-21 @ 07:01  -  03-25-21 @ 07:00  --------------------------------------------------------  IN:  Total IN: 0 mL    OUT:    Voided (mL): 300 mL  Total OUT: 300 mL    Total NET: -300 mL          LABS:                          8.0    13.13 )-----------( 254      ( 24 Mar 2021 05:20 )             23.2                                               03-24    130<L>  |  94<L>  |  16  ----------------------------<  90  4.8   |  26  |  <0.5<L>    Ca    9.0      24 Mar 2021 05:20  Phos  3.9     03-24  Mg     1.9     03-24    TPro  5.4<L>  /  Alb  2.7<L>  /  TBili  0.7  /  DBili  x   /  AST  27  /  ALT  9   /  AlkPhos  148<H>  03-24                                                                                           LIVER FUNCTIONS - ( 24 Mar 2021 05:20 )  Alb: 2.7 g/dL / Pro: 5.4 g/dL / ALK PHOS: 148 U/L / ALT: 9 U/L / AST: 27 U/L / GGT: x                                                                                                MEDICATIONS  (STANDING):  ascorbic acid 500 milliGRAM(s) Oral daily  chlorhexidine 4% Liquid 1 Application(s) Topical <User Schedule>  enoxaparin Injectable 40 milliGRAM(s) SubCutaneous daily  LORazepam   Injectable 1 milliGRAM(s) IV Push once  morphine  - Injectable 2 milliGRAM(s) IV Push once  multivitamin 1 Tablet(s) Oral daily  pantoprazole    Tablet 40 milliGRAM(s) Oral before breakfast  polyethylene glycol 3350 17 Gram(s) Oral daily  senna 2 Tablet(s) Oral at bedtime    MEDICATIONS  (PRN):  acetaminophen   Tablet .. 650 milliGRAM(s) Oral every 6 hours PRN Mild Pain (1 - 3)  lidocaine   Patch 1 Patch Transdermal daily PRN Back pain  traMADol 50 milliGRAM(s) Oral every 8 hours PRN Severe Pain (7 - 10)      X-Rays reviewed    CXR interpreted by me:

## 2021-03-25 NOTE — PROGRESS NOTE ADULT - ASSESSMENT
Impression  Stage IV Lung Adenocarcinoma with RUL mass extending into right hilum  Large right malignant pleural effusion s/p thora, positive for adenocarcinoma s/p pleurx catheter placement  Bone mets    Recommendations  s/p 1L drained during procedure  Repeat CXR  Incentive spirometry  HOB at 45  Aspiration precautions  DVT ppx  Overall poor prognosis Impression    Stage IV Lung Adenocarcinoma with RUL mass extending into right hilum  Large right malignant pleural effusion s/p thora, positive for adenocarcinoma s/p pleurx catheter placement  Bone mets    Recommendations  s/p 1L drained during procedure  Repeat CXR  Incentive spirometry  HOB at 45  Aspiration precautions  DVT ppx  Overall poor prognosis  palliative care eval

## 2021-03-25 NOTE — PROGRESS NOTE ADULT - ASSESSMENT
70 years old female from home and lives alone with PMHx of OA of bilateral knees, right rotator ruff tear (?), gout, diverticulosis, presented with difficulty ambulation, found to have metastatic adenocarcinoma and right hip fracture, sent to  for orthopedic oncology s/p hemiarthroplasty.    Impression:    # Metastatic adenocarcinoma, likely lung, with diffuse mets to bones and likely liver and adrenals  # Non-displaced fracture of Rt Femoral neck s/p right hemiarthroplasty at [facility]  - CT chest Non-con : Poorly defined malignant right upper lobe mass extending into the right hilum and mediastinum with occlusion of the right upper lobe airways. Metastatic osteolytic right scapular lesion. Acute mildly displaced left glenoid fracture and age-indeterminate T11 compression deformity. Left greater than right bilateral adrenal nodularity, likely metastatic.  - Bone scan : Metastatic sites  include right and left ilium, right sacroiliac joint, L2, T8, T11 (photopenic) and the right and left femoral intertrochanteric ridges.Increased uptake in the right 5, 6, 7, 8 anterior ribs suggesting rib fractures and in the left sixth anterior rib most likely representing rib fracture  - s/p Thora 3/15 : 1L fluid removed, consistent with exudative effusion. Cytology positive for malignant cells (Adenocarcinoma)  - Former smoker (1.5 PPD for 20 years)  - Pathology inconclusive for origin of adenocarcinoma, however with her smoking history and a RUL mass most likely this is from lung    Recommendations:  - Please get MRI head w/w/o Pipe, MRI Thoracic & lumbosacral spine  seen on Bone scan)  - NGS, PDL-1 and further IHC pending  - F/u palliative for pain management  - encourage incentive spirometer use in setting of multiple rib fractures.  - Patient does not want chemotherapy, awaiting PDL1 and NGS             70 years old female from home and lives alone with PMHx of OA of bilateral knees, right rotator ruff tear (?), gout, diverticulosis, presented with difficulty ambulation, found to have metastatic adenocarcinoma and right hip fracture, sent to  for orthopedic oncology s/p hemiarthroplasty.    Impression:  # Metastatic adenocarcinoma, suspect lung prmiary, with diffuse mets to bones and likely liver and adrenals  - Pathology inconclusive for origin of adenocarcinoma, however with her smoking history and a RUL mass most likely this is from lung  - Further IHC, molecular studies and PDL1 pending  - Positive for CK7, negative for CK20, VerEp4, TTF1, CDX2, PAX8, few cells positive for calreticulin  - Former smoker (1.5 PPD for 20 years)  - CT chest Non-con : Poorly defined malignant right upper lobe mass extending into the right hilum and mediastinum with occlusion of the right upper lobe airways. Metastatic osteolytic right scapular lesion. Acute mildly displaced left glenoid fracture and age-indeterminate T11 compression deformity. Left greater than right bilateral adrenal nodularity, likely metastatic.  - Bone scan : Metastatic sites  include right and left ilium, right sacroiliac joint, L2, T8, T11 (photopenic) and the right and left femoral intertrochanteric ridges.Increased uptake in the right 5, 6, 7, 8 anterior ribs suggesting rib fractures and in the left sixth anterior rib most likely representing rib fracture    # Non-displaced fracture of Rt Femoral neck s/p right he[facility]arthroplasty at [facility]    # Recurrent malignant pleural effusion  - s/p Thora 3/15 : 1L fluid removed; cytology positive for adenocarcinoma  - s/p PleurX 3/25    Recommendations:  - Please get MRI head w/w/o Pipe, MRI Thoracic & lumbosacral spine  seen on Bone scan)  - F/u NGS, PDL-1 and further IHC; patient does not want chemotherapy, awaiting PDL1 and NGS   - F/u palliative for pain management  - encourage incentive spirometer use in setting of multiple rib fractures  - Radiation oncology evaluation

## 2021-03-25 NOTE — PROGRESS NOTE ADULT - SUBJECTIVE AND OBJECTIVE BOX
24H events:    Patient was a bit somnolent this morning but became more alert with questioning  She was on supplemental oxygen  She is planned for PleurX tomorrow  SHe refused MR yesterday, reordered today    PAST MEDICAL & SURGICAL HISTORY  Diverticulosis    Gout    OA (osteoarthritis)    H/O total hysterectomy      SOCIAL HISTORY:  Negative for smoking/alcohol/drug use.     ALLERGIES:  moxifloxacin (Anaphylaxis (Moderate))    MEDICATIONS:  STANDING MEDICATIONS  ascorbic acid 500 milliGRAM(s) Oral daily  chlorhexidine 4% Liquid 1 Application(s) Topical <User Schedule>  enoxaparin Injectable 40 milliGRAM(s) SubCutaneous daily  LORazepam   Injectable 1 milliGRAM(s) IV Push once  multivitamin 1 Tablet(s) Oral daily  pantoprazole    Tablet 40 milliGRAM(s) Oral before breakfast  polyethylene glycol 3350 17 Gram(s) Oral daily  senna 2 Tablet(s) Oral at bedtime    PRN MEDICATIONS  acetaminophen   Tablet .. 650 milliGRAM(s) Oral every 6 hours PRN  lidocaine   Patch 1 Patch Transdermal daily PRN  traMADol 50 milliGRAM(s) Oral every 8 hours PRN    VITALS:   T(F): 98.2  HR: 107  BP: 116/57  RR: 18  SpO2: 98%    LABS:                        8.0    13.13 )-----------( 254      ( 24 Mar 2021 05:20 )             23.2     03-24    130<L>  |  94<L>  |  16  ----------------------------<  90  4.8   |  26  |  <0.5<L>    Ca    9.0      24 Mar 2021 05:20  Phos  3.9     03-24  Mg     1.9     03-24    TPro  5.4<L>  /  Alb  2.7<L>  /  TBili  0.7  /  DBili  x   /  AST  27  /  ALT  9   /  AlkPhos  148<H>  03-24    RADIOLOGY:  < from: Xray Chest 1 View-PORTABLE IMMEDIATE (Xray Chest 1 View-PORTABLE IMMEDIATE .) (03.25.21 @ 06:01) >  Impression:    Worsening opacification of the right hemithorax.    < end of copied text >      PHYSICAL EXAM:  GEN: No acute distress  HENT: NCAT, EOMI  LYMPH: No appreciable adenopathy  LUNGS: Mild tachypnea, decreased breath sounds  HEART: tachycardic  ABD: Soft, non-tender, non-distended  SKIN: No rash  NEURO: AAOX3     24H events:    Patient was a bit somnolent this morning but became more alert with questioning  She was on supplemental oxygen  PleurX placed today  She refused MR yesterday, reordered today    PAST MEDICAL & SURGICAL HISTORY  Diverticulosis    Gout    OA (osteoarthritis)    H/O total hysterectomy      SOCIAL HISTORY:  Negative for smoking/alcohol/drug use.     ALLERGIES:  moxifloxacin (Anaphylaxis (Moderate))    MEDICATIONS:  STANDING MEDICATIONS  ascorbic acid 500 milliGRAM(s) Oral daily  chlorhexidine 4% Liquid 1 Application(s) Topical <User Schedule>  enoxaparin Injectable 40 milliGRAM(s) SubCutaneous daily  LORazepam   Injectable 1 milliGRAM(s) IV Push once  multivitamin 1 Tablet(s) Oral daily  pantoprazole    Tablet 40 milliGRAM(s) Oral before breakfast  polyethylene glycol 3350 17 Gram(s) Oral daily  senna 2 Tablet(s) Oral at bedtime    PRN MEDICATIONS  acetaminophen   Tablet .. 650 milliGRAM(s) Oral every 6 hours PRN  lidocaine   Patch 1 Patch Transdermal daily PRN  traMADol 50 milliGRAM(s) Oral every 8 hours PRN    VITALS:   T(F): 98.2  HR: 107  BP: 116/57  RR: 18  SpO2: 98%    LABS:                        8.0    13.13 )-----------( 254      ( 24 Mar 2021 05:20 )             23.2     03-24    130<L>  |  94<L>  |  16  ----------------------------<  90  4.8   |  26  |  <0.5<L>    Ca    9.0      24 Mar 2021 05:20  Phos  3.9     03-24  Mg     1.9     03-24    TPro  5.4<L>  /  Alb  2.7<L>  /  TBili  0.7  /  DBili  x   /  AST  27  /  ALT  9   /  AlkPhos  148<H>  03-24    RADIOLOGY:  < from: Xray Chest 1 View-PORTABLE IMMEDIATE (Xray Chest 1 View-PORTABLE IMMEDIATE .) (03.25.21 @ 06:01) >  Impression:    Worsening opacification of the right hemithorax.    < end of copied text >      PHYSICAL EXAM:  GEN: No acute distress  HENT: NCAT, EOMI  LYMPH: No appreciable adenopathy  LUNGS: Mild tachypnea, decreased breath sounds  HEART: tachycardic  ABD: Soft, non-tender, non-distended  SKIN: No rash  NEURO: AAOX3

## 2021-03-25 NOTE — CONSULT NOTE ADULT - SUBJECTIVE AND OBJECTIVE BOX
HPI: 70 years old female from home and lives alone with PMHx of OA of bilateral knees, right rotator ruff tear (?), gout, diverticulosis, presented with difficulty ambulation. During her hospitalization , she was diagnosed with lung adenocarcinoma metastatic to the bones, pleural fluid, liver and adrenals ( Pleural fluid was positive for adenocarcinoma, PDL1 and NGS pending) , along with a non displaced fracture of right femoral neck. She was transferred to [facility] and underwent hemiarthroplasty of right hip by Dr Lord on 3/20/2021 and was transferred back to [facility].      `< from: Xray Chest 1 View- PORTABLE-Routine (Xray Chest 1 View- PORTABLE-Routine in AM.) (03.21.21 @ 07:45) >  COMPARISON:  March 19      IMPRESSION:  Right lungmass with increasing pleural effusion.    < end of copied text >  < from: Xray Hip w/ Pelvis 1 View, Right (03.20.21 @ 13:39) >  INTERPRETATION:  TIME OF EXAM: March 20, 2021 at 1:22 PM    CLINICAL INFORMATION: Postop right hip surgery    TECHNIQUE:   AP pelvis    INTERPRETATION:    Since the last exam, a right hip hemiarthroplasty has been placed. Orthopedic hardware in satisfactory position. Contrast material seen in the large bowel.      COMPARISON:  March 19 at 2:51 PM      IMPRESSION:  Status post right hip hemiarthroplasty.      < end of copied text >  < from: CT Abdomen and Pelvis w/ Oral Cont and w/ IV Cont (03.18.21 @ 02:58) >  IMPRESSION:  1.  Multiple hepatic lesions and bilateral adrenal gland nodule/masses, suspicious for metastatic disease.    2.  Osseous metastatic disease as described above; nondisplaced subcapital right femoral neck fracture again noted unchanged in appearance since March 14, 2021.    3.  Moderate size right pleural effusion and right basilar atelectasis.      < end of copied text >  < from: NM SPECT Bone Scan, Single Area Single Day (03.15.21 @ 21:28) >  Impression:  Multiple bony abnormalities which by pattern of distribution and correlation with CT findings are consistent with metastatic bone disease.  Metastatic sites  include right and left ilium, right sacroiliac joint, L2, T8, T11 (photopenic) and the right and left femoral intertrochanteric ridges.    Increased uptake in the right 5, 6, 7, 8 anterior ribs lining up one on top of the other in a pattern suggesting rib fractures and in the left sixth anterior rib most likely representing rib fracture    Probably joint abnormalities peripherally                ROB ZAVALA DO; Attending Nuclear Medicine  This document has been electronically signed. Mar 15 2021  9:48PM    < end of copied text >  Cytopathology - Non Gyn Report:   ACCESSION No: [id number]   HENLEYDOV 2   Cytopathology Addendum Report   Addendum   Immunohistochemical stains are performed and show the tumor is   positive for CK7, negative for CK20, Cordell-EP4, TTF-1, CDX2, PAX-8;   few cells are positive for calretinin, most likely representing   reactive mesothelial cells. Finidngs are not specific for   identifying the primary origin of the this malignant tumor, which   includes, but not limited to, lung, breast, upper   gastrointestinal tract, or pancreatobilliary tract. Clinical   correlation is required. Additional immunostains, PD-L1 stain,   and molecular testing are   pending.   Verified by: Jose L Garvey M.D.

## 2021-03-25 NOTE — CHART NOTE - NSCHARTNOTEFT_GEN_A_CORE
Patient had episode of desaturation on 3L N/C to 78% with tachycardia HR 130s.  Patient was immediately placed on NRB, with her oxygenation improved to 100% with mild improvement in HR to 118  EKG shows sinus tachycardia  Urgent CXR ordered  In chart review, patient is not on A/C, with extensive cancer history.   If CXR is otherwise unchanged from prior imaging, CTA will be considered Patient had episode of desaturation on 3L N/C to 78% with tachycardia HR 130s.  Patient was immediately placed on NRB, with her oxygenation improved to 100% with mild improvement in HR to 118  EKG shows sinus tachycardia  Urgent CXR ordered  In chart review, patient is not on A/C, with extensive cancer history.   If CXR is otherwise unchanged from prior imaging, CTA will be considered  Restart DVT PPx for now Patient had episode of desaturation on 3L N/C to 78% with tachycardia HR 130s.  Patient was immediately placed on NRB, with her oxygenation improved to 100% with mild improvement in HR to 118  EKG shows sinus tachycardia  Urgent CXR ordered  In chart review, patient is not on A/C due to drop in Hg, and has extensive cancer history.   If CXR is otherwise unchanged from prior imaging, CTA will be considered to r/o PE  Restart DVT PPx for now

## 2021-03-26 DIAGNOSIS — Z51.5 ENCOUNTER FOR PALLIATIVE CARE: ICD-10-CM

## 2021-03-26 DIAGNOSIS — C79.51 SECONDARY MALIGNANT NEOPLASM OF BONE: ICD-10-CM

## 2021-03-26 DIAGNOSIS — M54.5 LOW BACK PAIN: ICD-10-CM

## 2021-03-26 DIAGNOSIS — C34.11 MALIGNANT NEOPLASM OF UPPER LOBE, RIGHT BRONCHUS OR LUNG: ICD-10-CM

## 2021-03-26 LAB
ALBUMIN SERPL ELPH-MCNC: 2.9 G/DL — LOW (ref 3.5–5.2)
ALP SERPL-CCNC: 147 U/L — HIGH (ref 30–115)
ALT FLD-CCNC: 10 U/L — SIGNIFICANT CHANGE UP (ref 0–41)
ANION GAP SERPL CALC-SCNC: 11 MMOL/L — SIGNIFICANT CHANGE UP (ref 7–14)
AST SERPL-CCNC: 27 U/L — SIGNIFICANT CHANGE UP (ref 0–41)
BASOPHILS # BLD AUTO: 0.04 K/UL — SIGNIFICANT CHANGE UP (ref 0–0.2)
BASOPHILS NFR BLD AUTO: 0.3 % — SIGNIFICANT CHANGE UP (ref 0–1)
BILIRUB SERPL-MCNC: 0.8 MG/DL — SIGNIFICANT CHANGE UP (ref 0.2–1.2)
BUN SERPL-MCNC: 17 MG/DL — SIGNIFICANT CHANGE UP (ref 10–20)
CALCIUM SERPL-MCNC: 9.4 MG/DL — SIGNIFICANT CHANGE UP (ref 8.5–10.1)
CHLORIDE SERPL-SCNC: 91 MMOL/L — LOW (ref 98–110)
CO2 SERPL-SCNC: 29 MMOL/L — SIGNIFICANT CHANGE UP (ref 17–32)
CREAT SERPL-MCNC: 0.5 MG/DL — LOW (ref 0.7–1.5)
EOSINOPHIL # BLD AUTO: 0.1 K/UL — SIGNIFICANT CHANGE UP (ref 0–0.7)
EOSINOPHIL NFR BLD AUTO: 0.8 % — SIGNIFICANT CHANGE UP (ref 0–8)
GLUCOSE SERPL-MCNC: 99 MG/DL — SIGNIFICANT CHANGE UP (ref 70–99)
GRAM STN FLD: SIGNIFICANT CHANGE UP
HCT VFR BLD CALC: 23.2 % — LOW (ref 37–47)
HGB BLD-MCNC: 7.8 G/DL — LOW (ref 12–16)
IMM GRANULOCYTES NFR BLD AUTO: 4.2 % — HIGH (ref 0.1–0.3)
LYMPHOCYTES # BLD AUTO: 1.74 K/UL — SIGNIFICANT CHANGE UP (ref 1.2–3.4)
LYMPHOCYTES # BLD AUTO: 14.3 % — LOW (ref 20.5–51.1)
MAGNESIUM SERPL-MCNC: 1.9 MG/DL — SIGNIFICANT CHANGE UP (ref 1.8–2.4)
MCHC RBC-ENTMCNC: 32.6 PG — HIGH (ref 27–31)
MCHC RBC-ENTMCNC: 33.6 G/DL — SIGNIFICANT CHANGE UP (ref 32–37)
MCV RBC AUTO: 97.1 FL — SIGNIFICANT CHANGE UP (ref 81–99)
MONOCYTES # BLD AUTO: 0.95 K/UL — HIGH (ref 0.1–0.6)
MONOCYTES NFR BLD AUTO: 7.8 % — SIGNIFICANT CHANGE UP (ref 1.7–9.3)
NEUTROPHILS # BLD AUTO: 8.85 K/UL — HIGH (ref 1.4–6.5)
NEUTROPHILS NFR BLD AUTO: 72.6 % — SIGNIFICANT CHANGE UP (ref 42.2–75.2)
NIGHT BLUE STAIN TISS: SIGNIFICANT CHANGE UP
NRBC # BLD: 0 /100 WBCS — SIGNIFICANT CHANGE UP (ref 0–0)
PLATELET # BLD AUTO: 257 K/UL — SIGNIFICANT CHANGE UP (ref 130–400)
POTASSIUM SERPL-MCNC: 4.5 MMOL/L — SIGNIFICANT CHANGE UP (ref 3.5–5)
POTASSIUM SERPL-SCNC: 4.5 MMOL/L — SIGNIFICANT CHANGE UP (ref 3.5–5)
PROT SERPL-MCNC: 5.7 G/DL — LOW (ref 6–8)
RBC # BLD: 2.39 M/UL — LOW (ref 4.2–5.4)
RBC # FLD: 16.4 % — HIGH (ref 11.5–14.5)
SODIUM SERPL-SCNC: 131 MMOL/L — LOW (ref 135–146)
SPECIMEN SOURCE: SIGNIFICANT CHANGE UP
SPECIMEN SOURCE: SIGNIFICANT CHANGE UP
WBC # BLD: 12.19 K/UL — HIGH (ref 4.8–10.8)
WBC # FLD AUTO: 12.19 K/UL — HIGH (ref 4.8–10.8)

## 2021-03-26 PROCEDURE — 99223 1ST HOSP IP/OBS HIGH 75: CPT

## 2021-03-26 PROCEDURE — 99232 SBSQ HOSP IP/OBS MODERATE 35: CPT

## 2021-03-26 PROCEDURE — 71045 X-RAY EXAM CHEST 1 VIEW: CPT | Mod: 26

## 2021-03-26 PROCEDURE — 99231 SBSQ HOSP IP/OBS SF/LOW 25: CPT

## 2021-03-26 PROCEDURE — 99233 SBSQ HOSP IP/OBS HIGH 50: CPT

## 2021-03-26 RX ORDER — MORPHINE SULFATE 50 MG/1
4 CAPSULE, EXTENDED RELEASE ORAL EVERY 6 HOURS
Refills: 0 | Status: DISCONTINUED | OUTPATIENT
Start: 2021-03-26 | End: 2021-03-31

## 2021-03-26 RX ORDER — MORPHINE SULFATE 50 MG/1
2 CAPSULE, EXTENDED RELEASE ORAL EVERY 4 HOURS
Refills: 0 | Status: DISCONTINUED | OUTPATIENT
Start: 2021-03-26 | End: 2021-03-26

## 2021-03-26 RX ORDER — MORPHINE SULFATE 50 MG/1
4 CAPSULE, EXTENDED RELEASE ORAL
Refills: 0 | Status: DISCONTINUED | OUTPATIENT
Start: 2021-03-26 | End: 2021-03-31

## 2021-03-26 RX ADMIN — MORPHINE SULFATE 2 MILLIGRAM(S): 50 CAPSULE, EXTENDED RELEASE ORAL at 11:05

## 2021-03-26 RX ADMIN — MORPHINE SULFATE 2 MILLIGRAM(S): 50 CAPSULE, EXTENDED RELEASE ORAL at 09:40

## 2021-03-26 RX ADMIN — MORPHINE SULFATE 4 MILLIGRAM(S): 50 CAPSULE, EXTENDED RELEASE ORAL at 17:16

## 2021-03-26 RX ADMIN — SENNA PLUS 2 TABLET(S): 8.6 TABLET ORAL at 21:05

## 2021-03-26 RX ADMIN — MORPHINE SULFATE 4 MILLIGRAM(S): 50 CAPSULE, EXTENDED RELEASE ORAL at 17:00

## 2021-03-26 RX ADMIN — Medication 1 TABLET(S): at 11:05

## 2021-03-26 RX ADMIN — ENOXAPARIN SODIUM 40 MILLIGRAM(S): 100 INJECTION SUBCUTANEOUS at 11:04

## 2021-03-26 RX ADMIN — MORPHINE SULFATE 4 MILLIGRAM(S): 50 CAPSULE, EXTENDED RELEASE ORAL at 20:26

## 2021-03-26 RX ADMIN — POLYETHYLENE GLYCOL 3350 17 GRAM(S): 17 POWDER, FOR SOLUTION ORAL at 11:05

## 2021-03-26 RX ADMIN — PANTOPRAZOLE SODIUM 40 MILLIGRAM(S): 20 TABLET, DELAYED RELEASE ORAL at 06:23

## 2021-03-26 RX ADMIN — TRAMADOL HYDROCHLORIDE 50 MILLIGRAM(S): 50 TABLET ORAL at 04:11

## 2021-03-26 RX ADMIN — MORPHINE SULFATE 4 MILLIGRAM(S): 50 CAPSULE, EXTENDED RELEASE ORAL at 14:16

## 2021-03-26 RX ADMIN — Medication 500 MILLIGRAM(S): at 11:05

## 2021-03-26 NOTE — CONSULT NOTE ADULT - CONSULT REASON
Goals of care and symptom management
New diagnosis of lung adenocarcinoma
Evaluation palliative radiation - metastatic lung adenocarcinoma, bone involvement, pathologic hip fx

## 2021-03-26 NOTE — CONSULT NOTE ADULT - PROBLEM SELECTOR RECOMMENDATION 3
- due to bone mets  - Dc tramadol  -Increase morphine to 4 mg IV Q3h PRN - due to bone mets  - Dc tramadol  -Increase morphine to 4 mg IV Q3h PRN  -Add morphine 4 mg Iv Q6h ATC - due to bone mets  - Dc tramadol  -Increase morphine to 4 mg IV Q3h PRN  - Morphine 4mg Iv q 6 hrs RTC  -Add morphine 4 mg Iv Q6h ATC

## 2021-03-26 NOTE — PROGRESS NOTE ADULT - SUBJECTIVE AND OBJECTIVE BOX
DOV HENLEY 70y Female  MRN#: [id number]   Hospital Day: 3d    SUBJECTIVE  Patient is a 70y old Female who presents with a chief complaint of hip pain (26 Mar 2021 11:04)  Currently admitted to medicine with the primary diagnosis of   INTERVAL HPI AND OVERNIGHT EVENTS:  Patient was examined and seen at bedside. This morning she is resting comfortably in bed.    REVIEW OF SYMPTOMS:  CONSTITUTIONAL: No weakness, fevers or chills; No headaches  EYES: No visual changes, eye pain, or discharge  ENT: No vertigo; No ear pain or change in hearing; No sore throat or difficulty swallowing  NECK: No pain or stiffness  RESPIRATORY: No cough, wheezing, or hemoptysis; No shortness of breath  CARDIOVASCULAR: No chest pain or palpitations  GASTROINTESTINAL: No abdominal or epigastric pain; No nausea, vomiting, or hematemesis; No diarrhea or constipation; No melena or hematochezia  GENITOURINARY: No dysuria, frequency or hematuria  MUSCULOSKELETAL: No joint pain, no muscle pain, no weakness  NEUROLOGICAL: No numbness or weakness  SKIN: No itching or rashes    OBJECTIVE  PAST MEDICAL & SURGICAL HISTORY  Diverticulosis    Gout    OA (osteoarthritis)    H/O total hysterectomy      ALLERGIES:  moxifloxacin (Anaphylaxis (Moderate))    MEDICATIONS:  STANDING MEDICATIONS  ascorbic acid 500 milliGRAM(s) Oral daily  chlorhexidine 4% Liquid 1 Application(s) Topical <User Schedule>  enoxaparin Injectable 40 milliGRAM(s) SubCutaneous daily  multivitamin 1 Tablet(s) Oral daily  pantoprazole    Tablet 40 milliGRAM(s) Oral before breakfast  polyethylene glycol 3350 17 Gram(s) Oral daily  senna 2 Tablet(s) Oral at bedtime    PRN MEDICATIONS  acetaminophen   Tablet .. 650 milliGRAM(s) Oral every 6 hours PRN  lidocaine   Patch 1 Patch Transdermal daily PRN  morphine  - Injectable 4 milliGRAM(s) IV Push every 3 hours PRN      VITAL SIGNS: Last 24 Hours  T(C): 35.9 (26 Mar 2021 07:55), Max: 37.1 (25 Mar 2021 12:50)  T(F): 96.7 (26 Mar 2021 07:55), Max: 98.7 (25 Mar 2021 12:50)  HR: 104 (26 Mar 2021 07:55) (100 - 118)  BP: 110/69 (26 Mar 2021 07:55) (103/62 - 144/66)  BP(mean): --  RR: 20 (26 Mar 2021 07:55) (18 - 20)  SpO2: 100% (25 Mar 2021 13:37) (100% - 100%)    LABS:                        7.8    12.19 )-----------( 257      ( 26 Mar 2021 07:18 )             23.2     03-26    131<L>  |  91<L>  |  17  ----------------------------<  99  4.5   |  29  |  0.5<L>    Ca    9.4      26 Mar 2021 07:18  Mg     1.9     03-26    TPro  5.7<L>  /  Alb  2.9<L>  /  TBili  0.8  /  DBili  x   /  AST  27  /  ALT  10  /  AlkPhos  147<H>  03-26                  RADIOLOGY:  < from: Xray Chest 1 View- PORTABLE-Routine (Xray Chest 1 View- PORTABLE-Routine in AM.) (03.26.21 @ 06:35) >  Technique/Positioning:  Frontal portableradiograph of the chest.    Support devices:  Stable coiled right chest tube terminating within the inferior right hemithorax    Cardiac/mediastinum/hilum: Stable    Lung parenchyma/ Pleura: Stable dense masslike opacity, medial right upper lobe. Stable right pneumothorax estimated air gap approximately 15 mm      Skeleton/soft tissues: Stable degenerative changes.      Impression:    Stable dense masslike opacity, medial right upper lobe. Stable right pneumothorax estimated air gap [id number] mm    < end of copied text >      PHYSICAL EXAM:  CONSTITUTIONAL: No acute distress, ill appearing, well-developed, AAOx3  HEAD: Atraumatic, normocephalic  EYES: EOMI  ENT: Supple  PULMONARY: Markedly diminished R sided breath sounds  CARDIOVASCULAR: Regular rate and rhythm  GASTROINTESTINAL: Soft, non-tender, non-distended  MUSCULOSKELETAL: Chest wall tenderness to palpation  NEUROLOGY: non-focal  SKIN: intact    ASSESSMENT & PLAN  70 years old female from home and lives alone with PMHx of OA of bilateral knees, right rotator ruff tear (?), gout, diverticulosis, presented with difficulty ambulation due to worsening pain on hips and shoulders. Pt found to have femoral neck fracture, transferred to [facility] for surgery and since transferred back to [facility] for further oncological workup and PleurX catheter placement    # Right Perihilar Mass w/ large left pleural effusion  # Pulmonary adenoCA with metastasis   - CT Chest No Cont (03.14.21 @ 18:33): Poorly defined malignant right upper lobe mass extending into the right hilum and mediastinum with occlusion of the right upper lobe airways. Metastatic osteolytic right scapular lesion. Acute mildly displaced left glenoid fracture and age-indeterminate T11 compression deformity, underlying pathologic fractures are not excluded. Left greater than right bilateral adrenal nodularity, likely metastatic.  - s/p thoracentesis 3/15 (drained 1L): +ve for malignant cells (adenocarcinoma)   - NM Bone scan showed "Multiple bony abnormalities which by pattern of distribution and correlation with CT findings are consistent with metastatic bone disease.  Metastatic sites  include right and left ilium, right sacroiliac joint, L2, T8, T11 (photopenic) and the right and left femoral intertrochanteric ridges."  - Echo: EF 53%   - Pulmonary following--s/p PleurX catheter 3/25  - f/u CXR showed rt PTX, s/w pulm, normal sequela post procedure without any need for any acute interventions  - MRI performed, f/u results  - f/u Heme/Onc  - f/u Rad/Onc as outpt 3-4 wks    # Nondisplaced femoral neck subcapital impaction fracture s/p corrective surgery at [facility]  - Pain control with Tylenol   - CT Pelvis Bony Only No Cont (03.14.21 @ 22:01): Multiple infiltrative bone lesions, largest at the right iliac crest with nondisplaced pathologic fracture and soft tissue mass extension. Findings are compatible with osseous metastasis. Consider nuclear medicine bone scan for the assessment of skeletal metastasis. Nondisplaced right subcapital femoral neck impaction fracture. Consider MRI of the right hip to evaluate extent of disease.  - Ortho following--appreciate recs    #Hyponatremia--possibly due to malignancy  - fluid restriction 1 L  - follow up BMP     # Mild hypercalcemia - resolved   - Ca 10.4, albumin 4.4  - Likely secondary to dehydration (patient reports not eating or drinking much because it was difficult for her to get around)  - Continue gentle IVF for now    # Gout  - Not on any medication  - Will avoid red meat diet while inpatient    DVT ppx: Lovenox 40mg qdaily  GI ppx: Protonix  Diet: Regular  Ambulatory status: as tolerated,   Code status: FULL CODE  Dispo: acute    PAST MEDICAL & SURGICAL HISTORY:  Diverticulosis    Gout    OA (osteoarthritis)    H/O total hysterectomy     DOV HENLEY 70y Female  MRN#: [id number]   Hospital Day: 3d    SUBJECTIVE  Patient is a 70y old Female who presents with a chief complaint of hip pain (26 Mar 2021 11:04)  Currently admitted to medicine with the primary diagnosis of     INTERVAL HPI AND OVERNIGHT EVENTS:  Patient was examined and seen at bedside. This morning she is resting comfortably in bed.    REVIEW OF SYMPTOMS:  +SOB, Back pain, "lung pain"  denies any CP, N/V/D/C    OBJECTIVE  PAST MEDICAL & SURGICAL HISTORY  Diverticulosis    Gout    OA (osteoarthritis)    H/O total hysterectomy      ALLERGIES:  moxifloxacin (Anaphylaxis (Moderate))    MEDICATIONS:  STANDING MEDICATIONS  ascorbic acid 500 milliGRAM(s) Oral daily  chlorhexidine 4% Liquid 1 Application(s) Topical <User Schedule>  enoxaparin Injectable 40 milliGRAM(s) SubCutaneous daily  multivitamin 1 Tablet(s) Oral daily  pantoprazole    Tablet 40 milliGRAM(s) Oral before breakfast  polyethylene glycol 3350 17 Gram(s) Oral daily  senna 2 Tablet(s) Oral at bedtime    PRN MEDICATIONS  acetaminophen   Tablet .. 650 milliGRAM(s) Oral every 6 hours PRN  lidocaine   Patch 1 Patch Transdermal daily PRN  morphine  - Injectable 4 milliGRAM(s) IV Push every 3 hours PRN      VITAL SIGNS: Last 24 Hours  T(C): 35.9 (26 Mar 2021 07:55), Max: 37.1 (25 Mar 2021 12:50)  T(F): 96.7 (26 Mar 2021 07:55), Max: 98.7 (25 Mar 2021 12:50)  HR: 104 (26 Mar 2021 07:55) (100 - 118)  BP: 110/69 (26 Mar 2021 07:55) (103/62 - 144/66)  BP(mean): --  RR: 20 (26 Mar 2021 07:55) (18 - 20)  SpO2: 100% (25 Mar 2021 13:37) (100% - 100%)    LABS:                        7.8    12.19 )-----------( 257      ( 26 Mar 2021 07:18 )             23.2     03-26    131<L>  |  91<L>  |  17  ----------------------------<  99  4.5   |  29  |  0.5<L>    Ca    9.4      26 Mar 2021 07:18  Mg     1.9     03-26    TPro  5.7<L>  /  Alb  2.9<L>  /  TBili  0.8  /  DBili  x   /  AST  27  /  ALT  10  /  AlkPhos  147<H>  03-26                  RADIOLOGY:  < from: Xray Chest 1 View- PORTABLE-Routine (Xray Chest 1 View- PORTABLE-Routine in AM.) (03.26.21 @ 06:35) >  Technique/Positioning:  Frontal portableradiograph of the chest.    Support devices:  Stable coiled right chest tube terminating within the inferior right hemithorax    Cardiac/mediastinum/hilum: Stable    Lung parenchyma/ Pleura: Stable dense masslike opacity, medial right upper lobe. Stable right pneumothorax estimated air gap approximately 15 mm      Skeleton/soft tissues: Stable degenerative changes.      Impression:    Stable dense masslike opacity, medial right upper lobe. Stable right pneumothorax estimated air gap [id number] mm    < end of copied text >      PHYSICAL EXAM:  CONSTITUTIONAL: No acute distress, ill appearing, well-developed, AAOx3  HEAD: Atraumatic, normocephalic  EYES: EOMI  ENT: Supple  PULMONARY: Markedly diminished R sided breath sounds  CARDIOVASCULAR: Regular rate and rhythm  GASTROINTESTINAL: Soft, non-tender, non-distended  MUSCULOSKELETAL: Chest wall tenderness to palpation  NEUROLOGY: non-focal  SKIN: intact    ASSESSMENT & PLAN  70 years old female from home and lives alone with PMHx of OA of bilateral knees, right rotator ruff tear (?), gout, diverticulosis, presented with difficulty ambulation due to worsening pain on hips and shoulders. Pt found to have femoral neck fracture, transferred to [facility] for surgery and since transferred back to [facility] for further oncological workup and PleurX catheter placement    # Right Perihilar Mass w/ large left pleural effusion  # Pulmonary adenoCA with metastasis   - CT Chest No Cont (03.14.21 @ 18:33): Poorly defined malignant right upper lobe mass extending into the right hilum and mediastinum with occlusion of the right upper lobe airways. Metastatic osteolytic right scapular lesion. Acute mildly displaced left glenoid fracture and age-indeterminate T11 compression deformity, underlying pathologic fractures are not excluded. Left greater than right bilateral adrenal nodularity, likely metastatic.  - s/p thoracentesis 3/15 (drained 1L): +ve for malignant cells (adenocarcinoma)   - NM Bone scan showed "Multiple bony abnormalities which by pattern of distribution and correlation with CT findings are consistent with metastatic bone disease.  Metastatic sites  include right and left ilium, right sacroiliac joint, L2, T8, T11 (photopenic) and the right and left femoral intertrochanteric ridges."  - Pulmonary following--s/p PleurX catheter 3/25  - f/u CXR showed rt PTX, s/w pulm, normal sequela post procedure without any need for any acute interventions  - MRI performed, f/u results  - f/u Heme/Onc  - f/u Rad/Onc as outpt 3-4 wks  - Palliative consulted for pain mgmt recs--in meantime will increase to morphine 4mg q3 PRN    # Nondisplaced femoral neck subcapital impaction fracture s/p corrective surgery at [facility]  - Pain control with Tylenol   - CT Pelvis Bony Only No Cont (03.14.21 @ 22:01): Multiple infiltrative bone lesions, largest at the right iliac crest with nondisplaced pathologic fracture and soft tissue mass extension. Findings are compatible with osseous metastasis. Consider nuclear medicine bone scan for the assessment of skeletal metastasis. Nondisplaced right subcapital femoral neck impaction fracture. Consider MRI of the right hip to evaluate extent of disease.  - Ortho following--appreciate recs    #Hyponatremia--possibly due to malignancy  - fluid restriction 1 L  - follow up BMP     # Mild hypercalcemia - resolved   - Ca 10.4, albumin 4.4  - Likely secondary to dehydration (patient reports not eating or drinking much because it was difficult for her to get around)  - Continue gentle IVF for now    # Gout  - Not on any medication  - Will avoid red meat diet while inpatient    DVT ppx: Lovenox 40mg qdaily  GI ppx: Protonix  Diet: Regular  Ambulatory status: as tolerated,   Code status: FULL CODE  Dispo: acute    PAST MEDICAL & SURGICAL HISTORY:  Diverticulosis    Gout    OA (osteoarthritis)    H/O total hysterectomy

## 2021-03-26 NOTE — CONSULT NOTE ADULT - REASON FOR ADMISSION
Hip fracture
Patient is a 70y old  Female who presents with a chief complaint of Lung Cancer (25 Mar 2021 12:19)
hip pain

## 2021-03-26 NOTE — PROGRESS NOTE ADULT - SUBJECTIVE AND OBJECTIVE BOX
Patient is a 70y old  Female who presents with a chief complaint of Patient is a 70y old  Female who presents with a chief complaint of Lung Cancer (25 Mar 2021 12:19) (25 Mar 2021 13:21)    s/p pleurx draiange, 1L      PHYSICAL EXAM  Vital Signs Last 24 Hrs  T(C): 35.9 (26 Mar 2021 07:55), Max: 37.1 (25 Mar 2021 12:50)  T(F): 96.7 (26 Mar 2021 07:55), Max: 98.7 (25 Mar 2021 12:50)  HR: 104 (26 Mar 2021 07:55) (100 - 118)  BP: 110/69 (26 Mar 2021 07:55) (103/62 - 144/66)  BP(mean): --  RR: 20 (26 Mar 2021 07:55) (18 - 20)  SpO2: 100% (25 Mar 2021 13:37) (100% - 100%)    CONSTITUTIONAL:   Well nourished.  NAD    ENT:   Airway patent,   No thrush    EYES:   Clear bilaterally,   pupils equal, round and reactive to light.    CARDIAC:   Normal rate,   regular rhythm.    no edema      CAROTID:   normal systolic impulse  no bruits    RESPIRATORY:   No wheezing  Normal chest expansion  Not tachypneic,  Nno use of accessory muscles    GASTROINTESTINAL:  Abdomen soft,   non-tender,   no guarding,   + BS    GENITOURINARY  normal genitalia for sex  no edema    MUSCULOSKELETAL:   range of motion is not limited,  no clubbing, cyanosis    NEUROLOGICAL:   Alert and oriented   no motor  deficits.    SKIN:   Skin normal color for race,   No evidence of rash.    PSYCHIATRIC:   normal mood and affect.   no apparent risk to self or others.    HEME LYMPH:   No cervical  lymphadenopathy.  no inguinal lymphadenopathy      03-25-21 @ 07:01  -  03-26-21 @ 07:00  --------------------------------------------------------  IN:  Total IN: 0 mL    OUT:    Voided (mL): 0 mL  Total OUT: 0 mL    Total NET: 0 mL          LABS:                          7.8    12.19 )-----------( 257      ( 26 Mar 2021 07:18 )             23.2                                               03-26    131<L>  |  91<L>  |  17  ----------------------------<  99  4.5   |  29  |  0.5<L>    Ca    9.4      26 Mar 2021 07:18  Mg     1.9     03-26    TPro  5.7<L>  /  Alb  2.9<L>  /  TBili  0.8  /  DBili  x   /  AST  27  /  ALT  10  /  AlkPhos  147<H>  03-26                                                                                           LIVER FUNCTIONS - ( 26 Mar 2021 07:18 )  Alb: 2.9 g/dL / Pro: 5.7 g/dL / ALK PHOS: 147 U/L / ALT: 10 U/L / AST: 27 U/L / GGT: x                                                                                                MEDICATIONS  (STANDING):  ascorbic acid 500 milliGRAM(s) Oral daily  chlorhexidine 4% Liquid 1 Application(s) Topical <User Schedule>  enoxaparin Injectable 40 milliGRAM(s) SubCutaneous daily  multivitamin 1 Tablet(s) Oral daily  pantoprazole    Tablet 40 milliGRAM(s) Oral before breakfast  polyethylene glycol 3350 17 Gram(s) Oral daily  senna 2 Tablet(s) Oral at bedtime    MEDICATIONS  (PRN):  acetaminophen   Tablet .. 650 milliGRAM(s) Oral every 6 hours PRN Mild Pain (1 - 3)  lidocaine   Patch 1 Patch Transdermal daily PRN Back pain  traMADol 50 milliGRAM(s) Oral every 8 hours PRN Severe Pain (7 - 10)      X-Rays reviewed    CXR interpreted by me: Patient is a 70y old  Female who presents with a chief complaint of Patient is a 70y old  Female who presents with a chief complaint of Lung Cancer (25 Mar 2021 12:19) (25 Mar 2021 13:21)    s/p pleurx draiange, 1L      PHYSICAL EXAM  Vital Signs Last 24 Hrs  T(C): 35.9 (26 Mar 2021 07:55), Max: 37.1 (25 Mar 2021 12:50)  T(F): 96.7 (26 Mar 2021 07:55), Max: 98.7 (25 Mar 2021 12:50)  HR: 104 (26 Mar 2021 07:55) (100 - 118)  BP: 110/69 (26 Mar 2021 07:55) (103/62 - 144/66)  RR: 20 (26 Mar 2021 07:55) (18 - 20)  SpO2: 100% (25 Mar 2021 13:37) (100% - 100%)    CONSTITUTIONAL:  ill looking    ENT:   Airway patent,   No thrush    EYES:   Clear bilaterally,   pupils equal, round and reactive to light.    CARDIAC:     DERIK 3.6        RESPIRATORY:   DEC BS R side    GASTROINTESTINAL:  Abdomen soft,   non-tender,   no guarding,   + BS      MUSCULOSKELETAL:   range of motion is not limited,  no clubbing, cyanosis    NEUROLOGICAL:   Alert and oriented   no motor  deficits.    r hip scar    03-25-21 @ 07:01  -  03-26-21 @ 07:00  --------------------------------------------------------  IN:  Total IN: 0 mL    OUT:    Voided (mL): 0 mL  Total OUT: 0 mL    Total NET: 0 mL          LABS:                          7.8    12.19 )-----------( 257      ( 26 Mar 2021 07:18 )             23.2                                               03-26    131<L>  |  91<L>  |  17  ----------------------------<  99  4.5   |  29  |  0.5<L>    Ca    9.4      26 Mar 2021 07:18  Mg     1.9     03-26    TPro  5.7<L>  /  Alb  2.9<L>  /  TBili  0.8  /  DBili  x   /  AST  27  /  ALT  10  /  AlkPhos  147<H>  03-26                                                                                           LIVER FUNCTIONS - ( 26 Mar 2021 07:18 )  Alb: 2.9 g/dL / Pro: 5.7 g/dL / ALK PHOS: 147 U/L / ALT: 10 U/L / AST: 27 U/L / GGT: x                                                                                                MEDICATIONS  (STANDING):  ascorbic acid 500 milliGRAM(s) Oral daily  chlorhexidine 4% Liquid 1 Application(s) Topical <User Schedule>  enoxaparin Injectable 40 milliGRAM(s) SubCutaneous daily  multivitamin 1 Tablet(s) Oral daily  pantoprazole    Tablet 40 milliGRAM(s) Oral before breakfast  polyethylene glycol 3350 17 Gram(s) Oral daily  senna 2 Tablet(s) Oral at bedtime    MEDICATIONS  (PRN):  acetaminophen   Tablet .. 650 milliGRAM(s) Oral every 6 hours PRN Mild Pain (1 - 3)  lidocaine   Patch 1 Patch Transdermal daily PRN Back pain  traMADol 50 milliGRAM(s) Oral every 8 hours PRN Severe Pain (7 - 10)      CXR reviewed

## 2021-03-26 NOTE — PROGRESS NOTE ADULT - ASSESSMENT
Impression  Stage IV Lung Adenocarcinoma with RUL mass extending into right hilum  Large right malignant pleural effusion s/p thora, positive for adenocarcinoma s/p pleurx catheter placement  Bone mets    Recommendations  Drained another 1L today  Repeat CXR again  Incentive spirometry  HOB at 45  Aspiration precautions  DVT ppx  Overall poor prognosis  Palliative care eval Impression    Stage IV Lung Adenocarcinoma with RUL mass extending into right hilum  Large right malignant pleural effusion s/p thora, positive for adenocarcinoma s/p pleurx catheter placement for comfort  Bone mets    Recommendations  Drained another 1L today  Incentive spirometry  HOB at 45  Aspiration precautions  DVT ppx  Overall poor prognosis  Palliative care eval

## 2021-03-26 NOTE — PROGRESS NOTE ADULT - SUBJECTIVE AND OBJECTIVE BOX
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     ALLERGIES:  moxifloxacin (Anaphylaxis (Moderate))    MEDICATIONS:  STANDING MEDICATIONS  ascorbic acid 500 milliGRAM(s) Oral daily  chlorhexidine 4% Liquid 1 Application(s) Topical <User Schedule>  enoxaparin Injectable 40 milliGRAM(s) SubCutaneous daily  multivitamin 1 Tablet(s) Oral daily  pantoprazole    Tablet 40 milliGRAM(s) Oral before breakfast  polyethylene glycol 3350 17 Gram(s) Oral daily  senna 2 Tablet(s) Oral at bedtime    PRN MEDICATIONS  acetaminophen   Tablet .. 650 milliGRAM(s) Oral every 6 hours PRN  lidocaine   Patch 1 Patch Transdermal daily PRN  morphine  - Injectable 4 milliGRAM(s) IV Push every 3 hours PRN    VITALS:   T(F): 96.7  HR: 104  BP: 110/69  RR: 20  SpO2: 100%    LABS:                        7.8    12.19 )-----------( 257      ( 26 Mar 2021 07:18 )             23.2     03-26    131<L>  |  91<L>  |  17  ----------------------------<  99  4.5   |  29  |  0.5<L>    Ca    9.4      26 Mar 2021 07:18  Mg     1.9     03-26    TPro  5.7<L>  /  Alb  2.9<L>  /  TBili  0.8  /  DBili  x   /  AST  27  /  ALT  10  /  AlkPhos  147<H>  03-26                  RADIOLOGY:  < from: MR Thoracic Spine w/wo IV Cont (03.25.21 @ 22:19) >  IMPRESSION:    Limited study due to motion artifacts.    Diffuse osseous metastasis are noted throughout the thoracic and lumbar spines, worse at T2, T6, T7, T11, T12, L2, and L5 vertebralbodies as well as the sacroiliac bones with heterogeneous enhancements. There is mild retropulsion involving the compression fractures of the T6 and T7, and moderate retropulsion at T11 secondary to posteriorly encroaching soft tissue component.    Mild diffuse circumferential thickening/enhancement of the epidural soft tissues, most prominent at the level of T5-6 and T10-L1, likely representing metastasis.    mild scattered discontinuous enhancements along the nerve roots suspicious for leptomeningeal carcinomatosis.    Moderate diffuse edema/enhancement involving the paraspinal muscles likely representing nonspecific myositis.    Degenerative changes of the thoracic and lumbar spine as described above.    < end of copied text >  < from: MR Head w/wo IV Cont (03.25.21 @ 22:18) >  IMPRESSION:    1 cm enhancement in the right paracentral splenium of the corpus callosum with surrounding edema likely representing a metastatic lesion. No other intraparenchymal abnormal enhancement allowing for limitation from motion.    Mild diffuse pachymeningeal thickening and enhancement, most prominent along the left frontal convexity, could reflect dural metastasis given the patient's history, less likely due to intracranial hypotension.    Nonspecific low T1/T2 signal in the bilateral lentiform nucleus could be metabolic in etiology.    Mild chronic microvascular ischemic changes.    < end of copied text >    PHYSICAL EXAM:  GEN: No acute distress  HENT: NCAT, EOMI  LUNGS: Tachypneic, on NRB  HEART: tachycardic  ABD: Soft, non-tender, non-distended  SKIN: No rash  EXT: No edema  NEURO: AAOX3

## 2021-03-26 NOTE — CONSULT NOTE ADULT - PROBLEM SELECTOR RECOMMENDATION 2
-Diffuse bone mets casuing pain  - DC tramadol  -increase morphine to 4mg IV Q3h PRN -Diffuse bone mets casuing pain  - DC tramadol  -increase morphine to 4mg IV Q3h PRN  -Add morphine 4 mg IV Q6h ATC -Diffuse bone mets causing pain  - DC tramadol  -increase morphine to 4mg IV Q3h PRN  -Add morphine 4 mg IV Q6h ATC -Diffuse bone mets causing pain  - DC tramadol  -increase morphine to 4mg IV Q3h PRN  - Morphine 4mg IV q 6 hrs RTC   -Add morphine 4 mg IV Q6h ATC

## 2021-03-26 NOTE — CONSULT NOTE ADULT - ASSESSMENT
70yFemale being evaluated for goals of care and symptom management related to metastatic lung cancer with diffuse bony ailyn. Pt being seen for pain. Pt made herself DNR/DNI and spoke with heme/onc saying she did not want chemotherapy. Still awaiting path report for immunotherapy options.      MEDD (morphine equivalent daily dose): 6mg also taking tramadol cannot convert to MEDD      See Recs below.    Please call [phone] with questions or concerns 24/7.   We will continue to follow.    70yFemale being evaluated for goals of care and symptom management related to metastatic lung cancer with diffuse bony ailyn. Pt being seen for pain. Pt made herself DNR/DNI and spoke with heme/onc saying she did not want chemotherapy. Still awaiting path report for immunotherapy options.    MEDD (morphine equivalent daily dose): 6mg also taking tramadol cannot convert to MEDD      See recs below.    Please call [phone] with questions or concerns 24/7.   We will continue to follow.   Discussed with primary team and heme onc  70yFemale being evaluated for goals of care and symptom management related to metastatic lung cancer with diffuse bony ailyn. Pt being seen for pain. Pt made herself DNR/DNI and spoke with heme/onc saying she did not want chemotherapy. Still awaiting path report for immunotherapy options.      MEDD (morphine equivalent daily dose): 6mg also taking tramadol cannot convert to MEDD      See recs below.    Please call [phone] with questions or concerns 24/7.   We will continue to follow.   Discussed with primary team and heme onc

## 2021-03-26 NOTE — CONSULT NOTE ADULT - PROBLEM SELECTOR RECOMMENDATION 4
Patient seen for pain control  Recs as above. Patient seen for pain control. Pt is DNR/DNI, very poor prognosis. Spoke to heme/onc and primary team requested to update pt and family on current prognosis and options. Palliative care available for family meeting.   Recs as above.

## 2021-03-26 NOTE — CONSULT NOTE ADULT - SUBJECTIVE AND OBJECTIVE BOX
DOV HENLEY          MRN-[id number]              HPI: HPI: 70 years old female from home and lives alone with PMHx of OA of bilateral knees, right rotator ruff tear (?), gout, diverticulosis, presented with difficulty ambulation on 3/13/21. 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 She was transferred to [facility] on 3/19/21 and underwent hemiarthroplasty of right hip by Dr Lord on 3/20/2021 and was transferred back to [facility] admitted again on 3/23/21  Her workup showed also metastatic bone disease to the spine heme/onc following and physical therapy       PAST MEDICAL & SURGICAL HISTORY:  Diverticulosis    Gout    OA (osteoarthritis)    H/O total hysterectomy        FAMILY HISTORY:   Reviewed and found non contributory in mother or father    SOCIAL HISTORY:     ROS:    Unable to attain due to:                      Dyspnea (Gosia 0-10): 0                       N/V (Y/N): No                             Secretions (Y/N) : No                                          Agitation(Y/N): No                              Pain (Y/N): No                                 -Provocation/Palliation: N/A  -Quality/Quantity: N/A  -Radiating: N/A  -Severity: No pain  -Timing/Frequency: N/A  -Impact on ADLs: N/A    General:  Denied  HEENT:    Denied  Neck:  Denied  CVS:  Denied  Resp:  Denied  GI:  Denied    :  Denied  Musc:  Denied  Neuro:  Denied  Psych:  Denied  Skin:  Denied  Lymph:  Denied    Allergies    moxifloxacin (Anaphylaxis (Moderate))    Intolerances    dairy products (Stomach Upset)  lactose (Unknown)  shellfish (Stomach Upset)    Opiate Naive (Y/N):   -iStop reviewed (Y/N): YES   Ref#:        #: [id number]    There are no results for the search terms that you entered.      Medications:      MEDICATIONS  (STANDING):  ascorbic acid 500 milliGRAM(s) Oral daily  chlorhexidine 4% Liquid 1 Application(s) Topical <User Schedule>  enoxaparin Injectable 40 milliGRAM(s) SubCutaneous daily  multivitamin 1 Tablet(s) Oral daily  pantoprazole    Tablet 40 milliGRAM(s) Oral before breakfast  polyethylene glycol 3350 17 Gram(s) Oral daily  senna 2 Tablet(s) Oral at bedtime    MEDICATIONS  (PRN):  acetaminophen   Tablet .. 650 milliGRAM(s) Oral every 6 hours PRN Mild Pain (1 - 3)  lidocaine   Patch 1 Patch Transdermal daily PRN Back pain  morphine  - Injectable 2 milliGRAM(s) IV Push every 4 hours PRN Severe Pain (7 - 10)  traMADol 50 milliGRAM(s) Oral every 8 hours PRN Severe Pain (7 - 10)      Labs:    CBC:                        7.8    12.19 )-----------( 257      ( 26 Mar 2021 07:18 )             23.2     CMP:    03-26    131<L>  |  91<L>  |  17  ----------------------------<  99  4.5   |  29  |  0.5<L>    Ca    9.4      26 Mar 2021 07:18  Mg     1.9     03-26    TPro  5.7<L>  /  Alb  2.9<L>  /  TBili  0.8  /  DBili  x   /  AST  27  /  ALT  10  /  AlkPhos  147<H>  03-26     Albumin, Serum: 2.9 g/dL (03-26-21 @ 07:18)             Imaging:  Reviewed    PEx:  T(C): 35.9 (03-26-21 @ 07:55), Max: 37.1 (03-25-21 @ 12:50)  HR: 104 (03-26-21 @ 07:55) (100 - 118)  BP: 110/69 (03-26-21 @ 07:55) (103/62 - 144/66)  RR: 20 (03-26-21 @ 07:55) (18 - 20)  SpO2: 100% (03-25-21 @ 13:37) (100% - 100%)  Wt(kg): --  Daily     Daily     General: AAOx3    found in bed in NAD  HEENT:  NCAT PERRL EOMI Non icteric MOM  Neck: Supple no masses  CVS: RR S1S2 No M/G/R  Resp: Unlabored Non tachypneic No increased WOB  GI:  Soft NT ND BS+  :  Voiding / [place] / [employer]  Musc: No C/C/E    Neuro: Follows commands No focal deficits  Psych: Calm Pleasant  Skin: Non jaundiced   Lymph: Normal    Preadmit Karnofsky:  %           Current Karnofsky:     %  http://www.npcrc.org/files/news/karnofsky_performance_scale.pdf   http://www.npcrc.org/files/news/palliative_performance_scale_PPSv2.pdf  Cachexia (Y/N):   BMI:    Advanced Directives:  DNR/DNI    Decision maker: The patient is able to participate in complex medical decision making conversations.   Legal surrogate:    GOALS OF CARE DISCUSSION       Palliative care info/counseling provided	           Family meeting       Advanced Directives addressed please see Advance Care Planning Note	           See previous Palliative Medicine Note       Documentation of GOC: 	    REFERRALS	        Palliative Med        Unit SW/Case Mgmt              Speech/Swallow       Patient/Family Support       Massage Therapy       Music Therapy       Pet Therapy       Hospice       Nutrition       Dietician       PT/OT DOV HENLEY          MRN-[id number]              HPI: HPI: 70 years old female from home and lives alone with PMHx of OA of bilateral knees, right rotator ruff tear (?), gout, diverticulosis, presented with difficulty ambulation on 3/13/21. 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 She was transferred to [facility] on 3/19/21 and underwent hemiarthroplasty of right hip by Dr Lord on 3/20/2021 and was transferred back to [facility] admitted again on 3/23/21  Her workup showed also metastatic bone disease to the spine heme/onc following and physical therapy.    Patient seen and examined at bedside. She was sleeping, and woke up to voice. States pain is present mostly in her right side. Appears fatigued and weak.     PAST MEDICAL & SURGICAL HISTORY:  Diverticulosis  Gout  OA (osteoarthritis)  H/O total hysterectomy      FAMILY HISTORY:   Reviewed and found non contributory in mother or father    SOCIAL HISTORY: no smoking     ROS:  as below                     Dyspnea (Gosia 0-10): 0                       N/V (Y/N): No                             Secretions (Y/N) : No                                          Agitation(Y/N): No                              Pain (Y/N): yes                                 -Provocation/Palliation: movement   -Quality/Quantity: aching   -Radiating: N/A  -Severity: moderate   -Timing/Frequency ; intermittent   -Impact on ADLs: yes     General:  fatigued and weak   HEENT:    Denied  Neck:  Denied  CVS:  Denied  Resp:  Denied  GI:  Denied    :  Denied  Musc:  Denied  Neuro:  Denied  Psych:  Denied  Skin:  Denied  Lymph:  Denied    Allergies    moxifloxacin (Anaphylaxis (Moderate))    Intolerances    dairy products (Stomach Upset)  lactose (Unknown)  shellfish (Stomach Upset)    Opiate Naive (Y/N):   -iStop reviewed (Y/N): YES   Ref#:        #: [id number]    There are no results for the search terms that you entered.      Medications:      MEDICATIONS  (STANDING):  ascorbic acid 500 milliGRAM(s) Oral daily  chlorhexidine 4% Liquid 1 Application(s) Topical <User Schedule>  enoxaparin Injectable 40 milliGRAM(s) SubCutaneous daily  multivitamin 1 Tablet(s) Oral daily  pantoprazole    Tablet 40 milliGRAM(s) Oral before breakfast  polyethylene glycol 3350 17 Gram(s) Oral daily  senna 2 Tablet(s) Oral at bedtime    MEDICATIONS  (PRN):  acetaminophen   Tablet .. 650 milliGRAM(s) Oral every 6 hours PRN Mild Pain (1 - 3)  lidocaine   Patch 1 Patch Transdermal daily PRN Back pain  morphine  - Injectable 2 milliGRAM(s) IV Push every 4 hours PRN Severe Pain (7 - 10)  traMADol 50 milliGRAM(s) Oral every 8 hours PRN Severe Pain (7 - 10)      Labs:    CBC:                        7.8    12.19 )-----------( 257      ( 26 Mar 2021 07:18 )             23.2     CMP:    03-26    131<L>  |  91<L>  |  17  ----------------------------<  99  4.5   |  29  |  0.5<L>    Ca    9.4      26 Mar 2021 07:18  Mg     1.9     03-26    TPro  5.7<L>  /  Alb  2.9<L>  /  TBili  0.8  /  DBili  x   /  AST  27  /  ALT  10  /  AlkPhos  147<H>  03-26     Albumin, Serum: 2.9 g/dL (03-26-21 @ 07:18)      Imaging:  Reviewed personally  MRI spine 3/25/2021: Diffuse osseous metastasis are noted throughout the thoracic and lumbar spines, worse at T2, T6, T7, T11, T12, L2, and L5 vertebral bodies as well as the sacroiliac bones with heterogeneous enhancements. There is mild retropulsion involving the compression fractures of the T6 and T7, and moderate retropulsion at T11 secondary to posteriorly encroaching soft tissue component.    Mild diffuse circumferential thickening/enhancement of the epidural soft tissues, most prominent at the level of T5-6 and T10-L1, likely representing metastasis.    mild scattered discontinuous enhancements along the nerve roots suspicious for leptomeningeal carcinomatosis.    Moderate diffuse edema/enhancement involving the paraspinal muscles likely representing nonspecific myositis.    Degenerative changes of the thoracic and lumbar spine as described above.    Ct A/p 3/18: Multiple hepatic lesions and bilateral adrenal gland nodule/masses, suspicious for metastatic disease.    2.  Osseous metastatic disease as described above; nondisplaced subcapital right femoral neck fracture again noted unchanged in appearance since March 14, 2021.    3.  Moderate size right pleural effusion and right basilar atelectasis.      NM scan 3/17: Multiple bony abnormalities which by pattern of distribution and correlation with CT findings are consistent with metastatic bone disease.  Metastatic sites  include right and left ilium, right sacroiliac joint, L2, T8, T11 (photopenic) and the right and left femoral intertrochanteric ridges.    Increased uptake in the right 5, 6, 7, 8 anterior ribs lining up one on top of the other in a pattern suggesting rib fractures and in the left sixth anterior rib most likely representing rib fracture    Probably joint abnormalities peripherally      PEx:  T(C): 35.9 (03-26-21 @ 07:55), Max: 37.1 (03-25-21 @ 12:50)  HR: 104 (03-26-21 @ 07:55) (100 - 118)  BP: 110/69 (03-26-21 @ 07:55) (103/62 - 144/66)  RR: 20 (03-26-21 @ 07:55) (18 - 20)  SpO2: 100% (03-25-21 @ 13:37) (100% - 100%)  Wt(kg): --  Daily     Daily     General: AAOx3    found in bed in NAD, fatigued and weak   HEENT:  NCAT PERRL EOMI Non icteric MOM  Neck: Supple no masses  CVS: RR S1S2 No M/G/R  Resp: Unlabored Non tachypneic . decreased BS on right   GI:  Soft NT ND BS+  :   [place]  Musc: No C/C/E    Neuro: Follows commands No focal deficits  Psych: Calm Pleasant  Skin: Non jaundiced   Lymph: Normal    Preadmit Karnofsky:  %    50       Current Karnofsky:     %30  http://www.npcrc.org/files/news/karnofsky_performance_scale.pdf   http://www.npcrc.org/files/news/palliative_performance_scale_PPSv2.pdf  Cachexia (Y/N): n  BMI:    Advanced Directives:  DNR/DNI    Decision maker: The patient is able to participate in complex medical decision making conversations.   Legal surrogate:     GOALS OF CARE DISCUSSION       Palliative care info/counseling provided	           Advanced Directives addressed please see Advance Care Planning Note	    	    REFERRALS	        Palliative Med        Unit SW/Case Mgmt              Nutrition       Dietician       PT/OT DOV HENLEY          [id number]              HPI: HPI: 70 years old female from home and lives alone with PMHx of OA of bilateral knees, right rotator ruff tear (?), gout, diverticulosis, presented with difficulty ambulation on 3/13/21. 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 She was [facility] on 3/[facility]21 and underwent hemiarthroplasty of right hip by Dr Lord on 3/20/2021 and was transferred back to [facility] admitted again on 3/23/21  Her workup showed also metastatic bone disease to the spine heme/onc following and physical therapy.    Patient seen and examined at bedside. She was sleeping, and woke up to voice. States pain is present mostly in her right side. Appears fatigued and weak.     PAST MEDICAL & SURGICAL HISTORY:  Diverticulosis  Gout  OA (osteoarthritis)  H/O total hysterectomy      FAMILY HISTORY:   Reviewed and found non contributory in mother or father    SOCIAL HISTORY: no smoking     ROS:  as below                     Dyspnea (Gosia 0-10): 0                       N/V (Y/N): No                             Secretions (Y/N) : No                                          Agitation(Y/N): No                              Pain (Y/N): yes                                 -Provocation/Palliation: movement   -Quality/Quantity: aching   -Radiating: N/A  -Severity: moderate   -Timing/Frequency ; intermittent   -Impact on ADLs: yes     General:  fatigued and weak   HEENT:    Denied  Neck:  Denied  CVS:  Denied  Resp:  Denied  GI:  Denied    :  Denied  Musc:  Denied  Neuro:  Denied  Psych:  Denied  Skin:  Denied  Lymph:  Denied    Allergies    moxifloxacin (Anaphylaxis (Moderate))    Intolerances    dairy products (Stomach Upset)  lactose (Unknown)  shellfish (Stomach Upset)    Opiate Naive (Y/N):   -iStop reviewed (Y/N): YES   Ref#:        #: [id number]    There are no results for the search terms that you entered.      Medications:      MEDICATIONS  (STANDING):  ascorbic acid 500 milliGRAM(s) Oral daily  chlorhexidine 4% Liquid 1 Application(s) Topical <User Schedule>  enoxaparin Injectable 40 milliGRAM(s) SubCutaneous daily  multivitamin 1 Tablet(s) Oral daily  pantoprazole    Tablet 40 milliGRAM(s) Oral before breakfast  polyethylene glycol 3350 17 Gram(s) Oral daily  senna 2 Tablet(s) Oral at bedtime    MEDICATIONS  (PRN):  acetaminophen   Tablet .. 650 milliGRAM(s) Oral every 6 hours PRN Mild Pain (1 - 3)  lidocaine   Patch 1 Patch Transdermal daily PRN Back pain  morphine  - Injectable 2 milliGRAM(s) IV Push every 4 hours PRN Severe Pain (7 - 10)  traMADol 50 milliGRAM(s) Oral every 8 hours PRN Severe Pain (7 - 10)      Labs:    CBC:                        7.8    12.19 )-----------( 257      ( 26 Mar 2021 07:18 )             23.2     CMP:    03-26    131<L>  |  91<L>  |  17  ----------------------------<  99  4.5   |  29  |  0.5<L>    Ca    9.4      26 Mar 2021 07:18  Mg     1.9     03-26    TPro  5.7<L>  /  Alb  2.9<L>  /  TBili  0.8  /  DBili  x   /  AST  27  /  ALT  10  /  AlkPhos  147<H>  03-26     Albumin, Serum: 2.9 g/dL (03-26-21 @ 07:18)      Imaging:  Reviewed personally  MRI spine 3/25/2021: Diffuse osseous metastasis are noted throughout the thoracic and lumbar spines, worse at T2, T6, T7, T11, T12, L2, and L5 vertebral bodies as well as the sacroiliac bones with heterogeneous enhancements. There is mild retropulsion involving the compression fractures of the T6 and T7, and moderate retropulsion at T11 secondary to posteriorly encroaching soft tissue component.    Mild diffuse circumferential thickening/enhancement of the epidural soft tissues, most prominent at the level of T5-6 and T10-L1, likely representing metastasis.    mild scattered discontinuous enhancements along the nerve roots suspicious for leptomeningeal carcinomatosis.    Moderate diffuse edema/enhancement involving the paraspinal muscles likely representing nonspecific myositis.    Degenerative changes of the thoracic and lumbar spine as described above.    Ct A/p 3/18: Multiple hepatic lesions and bilateral adrenal gland nodule/masses, suspicious for metastatic disease.    2.  Osseous metastatic disease as described above; nondisplaced subcapital right femoral neck fracture again noted unchanged in appearance since March 14, 2021.    3.  Moderate size right pleural effusion and right basilar atelectasis.      NM scan 3/17: Multiple bony abnormalities which by pattern of distribution and correlation with CT findings are consistent with metastatic bone disease.  Metastatic sites  include right and left ilium, right sacroiliac joint, L2, T8, T11 (photopenic) and the right and left femoral intertrochanteric ridges.    Increased uptake in the right 5, 6, 7, 8 anterior ribs lining up one on top of the other in a pattern suggesting rib fractures and in the left sixth anterior rib most likely representing rib fracture    Probably joint abnormalities peripherally      PEx:  T(C): 35.9 (03-26-21 @ 07:55), Max: 37.1 (03-25-21 @ 12:50)  HR: 104 (03-26-21 @ 07:55) (100 - 118)  BP: 110/69 (03-26-21 @ 07:55) (103/62 - 144/66)  RR: 20 (03-26-21 @ 07:55) (18 - 20)  SpO2: 100% (03-25-21 @ 13:37) (100% - 100%)  Wt(kg): --  Daily     Daily     General: AAOx3    found in bed in NAD, fatigued and weak   HEENT:  NCAT PERRL EOMI Non icteric MOM  Neck: Supple no masses  CVS: RR S1S2 edema general   Resp: Unlabored Non tachypneic . decreased BS on right   GI:  Soft NT ND BS+  :   [place]  Musc: No C/C/E    Neuro: Follows commands No focal deficits  Psych: Calm Pleasant  Skin: Non jaundiced   Lymph: Normal    Preadmit Karnofsky:  %    50       Current Karnofsky:     %30  http://www.npcrc.org/files/news/karnofsky_performance_scale.pdf   http://www.npcrc.org/files/news/palliative_performance_scale_PPSv2.pdf  Cachexia (Y/N): n  BMI:    Advanced Directives:  DNR/DNI    Decision maker: The patient is able to participate in complex medical decision making conversations.   Legal surrogate:     GOALS OF CARE DISCUSSION       Palliative care info/counseling provided	           Advanced Directives addressed please see Advance Care Planning Note	    	    REFERRALS	        Palliative Med        Unit SW/Case Mgmt              Nutrition       Dietician       PT/OT DOV HENLEY          MRN-[id number]              HPI: HPI: 70 years old female from home and lives alone with PMHx of OA of bilateral knees, right rotator ruff tear (?), gout, diverticulosis, presented with difficulty ambulation on 3/13/21. 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 She was transferred to [facility] on 3/19/21 and underwent hemiarthroplasty of right hip by Dr Lord on 3/20/2021 and was transferred back to [facility] admitted again on 3/23/21  Her workup showed also metastatic bone disease to the spine heme/onc following and physical therapy.    Patient seen and examined at bedside. She was sleeping, and woke up to voice. States pain is present mostly in her right side. Appears fatigued and weak.     PAST MEDICAL & SURGICAL HISTORY:  Diverticulosis  Gout  OA (osteoarthritis)  H/O total hysterectomy      FAMILY HISTORY:   Reviewed and found non contributory in mother or father    SOCIAL HISTORY: no smoking     ROS:  as below                     Dyspnea (Gosia 0-10): 0                       N/V (Y/N): No                             Secretions (Y/N) : No                                          Agitation(Y/N): No                              Pain (Y/N): yes                                 -Provocation/Palliation: movement   -Quality/Quantity: aching   -Radiating: N/A  -Severity: moderate   -Timing/Frequency ; intermittent   -Impact on ADLs: yes     General:  fatigued and weak   HEENT:    Denied  Neck:  Denied  CVS:  Denied  Resp:  Denied  GI:  Denied    :  Denied  Musc:  Denied  Neuro:  Denied  Psych:  Denied  Skin:  Denied  Lymph:  Denied    Allergies    moxifloxacin (Anaphylaxis (Moderate))    Intolerances    dairy products (Stomach Upset)  lactose (Unknown)  shellfish (Stomach Upset)    Opiate Naive (Y/N):   -iStop reviewed (Y/N): YES   Ref#:        #: [id number]    There are no results for the search terms that you entered.      Medications:      MEDICATIONS  (STANDING):  ascorbic acid 500 milliGRAM(s) Oral daily  chlorhexidine 4% Liquid 1 Application(s) Topical <User Schedule>  enoxaparin Injectable 40 milliGRAM(s) SubCutaneous daily  multivitamin 1 Tablet(s) Oral daily  pantoprazole    Tablet 40 milliGRAM(s) Oral before breakfast  polyethylene glycol 3350 17 Gram(s) Oral daily  senna 2 Tablet(s) Oral at bedtime    MEDICATIONS  (PRN):  acetaminophen   Tablet .. 650 milliGRAM(s) Oral every 6 hours PRN Mild Pain (1 - 3)  lidocaine   Patch 1 Patch Transdermal daily PRN Back pain  morphine  - Injectable 2 milliGRAM(s) IV Push every 4 hours PRN Severe Pain (7 - 10)  traMADol 50 milliGRAM(s) Oral every 8 hours PRN Severe Pain (7 - 10)      Labs:    CBC:                        7.8    12.19 )-----------( 257      ( 26 Mar 2021 07:18 )             23.2     CMP:    03-26    131<L>  |  91<L>  |  17  ----------------------------<  99  4.5   |  29  |  0.5<L>    Ca    9.4      26 Mar 2021 07:18  Mg     1.9     03-26    TPro  5.7<L>  /  Alb  2.9<L>  /  TBili  0.8  /  DBili  x   /  AST  27  /  ALT  10  /  AlkPhos  147<H>  03-26     Albumin, Serum: 2.9 g/dL (03-26-21 @ 07:18)      Imaging:  Reviewed personally  MRI spine 3/25/2021: Diffuse osseous metastasis are noted throughout the thoracic and lumbar spines, worse at T2, T6, T7, T11, T12, L2, and L5 vertebral bodies as well as the sacroiliac bones with heterogeneous enhancements. There is mild retropulsion involving the compression fractures of the T6 and T7, and moderate retropulsion at T11 secondary to posteriorly encroaching soft tissue component.    Mild diffuse circumferential thickening/enhancement of the epidural soft tissues, most prominent at the level of T5-6 and T10-L1, likely representing metastasis.    mild scattered discontinuous enhancements along the nerve roots suspicious for leptomeningeal carcinomatosis.    Moderate diffuse edema/enhancement involving the paraspinal muscles likely representing nonspecific myositis.    Degenerative changes of the thoracic and lumbar spine as described above.    Ct A/p 3/18: Multiple hepatic lesions and bilateral adrenal gland nodule/masses, suspicious for metastatic disease.    2.  Osseous metastatic disease as described above; nondisplaced subcapital right femoral neck fracture again noted unchanged in appearance since March 14, 2021.    3.  Moderate size right pleural effusion and right basilar atelectasis.      NM scan 3/17: Multiple bony abnormalities which by pattern of distribution and correlation with CT findings are consistent with metastatic bone disease.  Metastatic sites  include right and left ilium, right sacroiliac joint, L2, T8, T11 (photopenic) and the right and left femoral intertrochanteric ridges.    Increased uptake in the right 5, 6, 7, 8 anterior ribs lining up one on top of the other in a pattern suggesting rib fractures and in the left sixth anterior rib most likely representing rib fracture    Probably joint abnormalities peripherally      PEx:  T(C): 35.9 (03-26-21 @ 07:55), Max: 37.1 (03-25-21 @ 12:50)  HR: 104 (03-26-21 @ 07:55) (100 - 118)  BP: 110/69 (03-26-21 @ 07:55) (103/62 - 144/66)  RR: 20 (03-26-21 @ 07:55) (18 - 20)  SpO2: 100% (03-25-21 @ 13:37) (100% - 100%)  Wt(kg): --  Daily     Daily     General: AAOx3    found in bed in NAD, fatigued and weak   HEENT:  NCAT PERRL EOMI Non icteric MOM  Neck: Supple no masses  CVS: RR S1S2 edema general   Resp: Unlabored (+) tachypneic . decreased BS on right   GI:  Soft NT ND BS+  :   [place]  Musc: No C/C/E    Neuro: Follows commands No focal deficits  Psych: Calm Pleasant  Skin: Non jaundiced   Lymph: Normal    Preadmit Karnofsky:  %    50       Current Karnofsky:     %30  http://www.npcrc.org/files/news/karnofsky_performance_scale.pdf   http://www.npcrc.org/files/news/palliative_performance_scale_PPSv2.pdf  Cachexia (Y/N): n  BMI:    Advanced Directives:  DNR/DNI    Decision maker: The patient is able to participate in complex medical decision making conversations.   Legal surrogate: HCP done sister Carmita [phone]  and other sister Yaritza [phone]    GOALS OF CARE DISCUSSION       Palliative care info/counseling provided	           Advanced Directives addressed please see Advance Care Planning Note	    	    REFERRALS	        Palliative Med        Unit SW/Case Mgmt              Nutrition       Dietician       PT/OT

## 2021-03-26 NOTE — CONSULT NOTE ADULT - PROBLEM SELECTOR RECOMMENDATION 9
Patient has been diagnosed with right sided lung ca.  Awaiting pathology result to determine treatment.  Awaiting MRI  HAs multiple mets.  Appreciate heme onc recs.  Appreciate rad onc recs.  S/p plurex on 3/25 due to malignant effusion.

## 2021-03-26 NOTE — CONSULT NOTE ADULT - ATTENDING COMMENTS
Recent events noted. Widely metastatic lung cancer.  S/p hip surgery for fracture.  In the interim, further imaging showed:     MRI Brain: 1 cm enhancement in the right paracentral splenium of the corpus callosum with surrounding edema likely representing a metastatic lesion. No other intraparenchymal abnormal enhancement allowing for limitation from motion. Mild diffuse pachymeningeal thickening and enhancement, most prominent along the left frontal convexity, could reflect dural metastasis given the patient's history, less likely due to intracranial hypotension.  Nonspecific low T1/T2 signal in the bilateral lentiform nucleus could be metabolic in etiology. Mild chronic microvascular ischemic changes.    MRI T/L Spine: Diffuse osseous metastasis are noted throughout the thoracic and lumbar spines, worse at T2, T6, T7, T11, T12, L2, and L5 vertebral bodies as well as the sacroiliac bones with heterogeneous enhancements. There is mild retropulsion involving the compression fractures of the T6 and T7, and moderate retropulsion at T11 secondary to posteriorly encroaching soft tissue component. Mild diffuse circumferential thickening/enhancement of the epidural soft tissues, most prominent at the level of T5-6 and T10-L1, likely representing metastasis. mild scattered discontinuous enhancements along the nerve roots suspicious for leptomeningeal carcinomatosis. Moderate diffuse edema/enhancement involving the paraspinal muscles likely representing nonspecific myositis.    Given the extent of disease burden and potential leptomeningeal carcinomatosis, her prognosis is quite poor.  She is not interested in chemotherapy but is open to immunotherapy.  I explained that treatment toxicity from craniospinal irradiation for leptomeningeal carcinomatosis outweighs any potential benefit.  She will have a discussion with Dr. Chen regarding systemic therapy options upfront.  If she responds well, we could re-visit localized palliative radiation to troublesome areas.  We also discussed the option of supportive care alone but she does not want to explore that at this moment.    Discussed with Dr. Chen, the patient, and her sister, Carmita.
Pt seen and examined. Agree with above A/P.  Pt with Stage IV lung cancer. Await final path, PDL 1 and mutation status to determine plan for systemic therapy.  Get Rad/onc consult
70yFemale being evaluated for goals of care and symptom management related to metastatic lung cancer with diffuse bony ailyn. Pt being seen for pain. Pt made herself DNR/DNI and spoke with heme/onc saying she did not want chemotherapy. Still awaiting path report for immunotherapy options. Patient seen and examined at bedside. C/o ongoing pain which is not relieved with tramadol. Discussed discontinuing tramdol and starting ATC morphine and increasing dose of PRN morphine.  We will continue to follow and assess pain.

## 2021-03-26 NOTE — PROGRESS NOTE ADULT - ASSESSMENT
70 years old female from home and lives alone with PMHx of OA of bilateral knees, right rotator ruff tear (?), gout, diverticulosis, presented with difficulty ambulation, found to have metastatic adenocarcinoma and right hip fracture, sent to  for orthopedic oncology s/p hemiarthroplasty.    Impression:  # Metastatic adenocarcinoma, suspect lung prmiary, with diffuse mets to bones and likely liver and adrenals  - Pathology inconclusive for origin of adenocarcinoma, however with her smoking history and a RUL mass most likely this is from lung  - Further IHC, molecular studies and PDL1 pending  - Positive for CK7, negative for CK20, VerEp4, TTF1, CDX2, PAX8, few cells positive for calreticulin  - Former smoker (1.5 PPD for 20 years)  - CT chest Non-con : Poorly defined malignant right upper lobe mass extending into the right hilum and mediastinum with occlusion of the right upper lobe airways. Metastatic osteolytic right scapular lesion. Acute mildly displaced left glenoid fracture and age-indeterminate T11 compression deformity. Left greater than right bilateral adrenal nodularity, likely metastatic.  - Bone scan : Metastatic sites  include right and left ilium, right sacroiliac joint, L2, T8, T11 (photopenic) and the right and left femoral intertrochanteric ridges.Increased uptake in the right 5, 6, 7, 8 anterior ribs suggesting rib fractures and in the left sixth anterior rib most likely representing rib fracture  - MRI brain showed parenchymal lesion as well as likely leptomeningeal disease  - MR spine showed diffuse mets including T2, T6, T7, T11, T12, L2, L5 with likely leptomeningeal involvement    # Non-displaced fracture of Rt Femoral neck s/p right hemiarthroplasty at [facility]    # Recurrent malignant pleural effusion  - s/p Thora 3/15 : 1L fluid removed; cytology positive for adenocarcinoma  - s/p PleurX 3/25, had drainage again today    Recommendations:  - F/u NGS, PDL-1 and further IHC; patient does not want chemotherapy, awaiting PDL1 and NGS to see if she is eligible for immuno or targeted therapy  - F/u palliative  - F/u radiation oncology, initially planning for outpatient RT in 3-4 weeks  - May need steroids for brain met with surrounding edema    *** WILL DISCUSS WITH ATTENDING***

## 2021-03-27 LAB
ALBUMIN SERPL ELPH-MCNC: 2.9 G/DL — LOW (ref 3.5–5.2)
ALP SERPL-CCNC: 166 U/L — HIGH (ref 30–115)
ALT FLD-CCNC: 10 U/L — SIGNIFICANT CHANGE UP (ref 0–41)
ANION GAP SERPL CALC-SCNC: 11 MMOL/L — SIGNIFICANT CHANGE UP (ref 7–14)
AST SERPL-CCNC: 24 U/L — SIGNIFICANT CHANGE UP (ref 0–41)
BASOPHILS # BLD AUTO: 0.09 K/UL — SIGNIFICANT CHANGE UP (ref 0–0.2)
BASOPHILS NFR BLD AUTO: 0.6 % — SIGNIFICANT CHANGE UP (ref 0–1)
BILIRUB SERPL-MCNC: 1.2 MG/DL — SIGNIFICANT CHANGE UP (ref 0.2–1.2)
BUN SERPL-MCNC: 20 MG/DL — SIGNIFICANT CHANGE UP (ref 10–20)
CALCIUM SERPL-MCNC: 10.1 MG/DL — SIGNIFICANT CHANGE UP (ref 8.5–10.1)
CHLORIDE SERPL-SCNC: 90 MMOL/L — LOW (ref 98–110)
CO2 SERPL-SCNC: 31 MMOL/L — SIGNIFICANT CHANGE UP (ref 17–32)
CREAT SERPL-MCNC: 0.6 MG/DL — LOW (ref 0.7–1.5)
EOSINOPHIL # BLD AUTO: 0.22 K/UL — SIGNIFICANT CHANGE UP (ref 0–0.7)
EOSINOPHIL NFR BLD AUTO: 1.5 % — SIGNIFICANT CHANGE UP (ref 0–8)
GLUCOSE SERPL-MCNC: 101 MG/DL — HIGH (ref 70–99)
HCT VFR BLD CALC: 24.5 % — LOW (ref 37–47)
HGB BLD-MCNC: 8.3 G/DL — LOW (ref 12–16)
IMM GRANULOCYTES NFR BLD AUTO: 5.2 % — HIGH (ref 0.1–0.3)
LYMPHOCYTES # BLD AUTO: 1.97 K/UL — SIGNIFICANT CHANGE UP (ref 1.2–3.4)
LYMPHOCYTES # BLD AUTO: 13.8 % — LOW (ref 20.5–51.1)
MAGNESIUM SERPL-MCNC: 2.1 MG/DL — SIGNIFICANT CHANGE UP (ref 1.8–2.4)
MCHC RBC-ENTMCNC: 32.8 PG — HIGH (ref 27–31)
MCHC RBC-ENTMCNC: 33.9 G/DL — SIGNIFICANT CHANGE UP (ref 32–37)
MCV RBC AUTO: 96.8 FL — SIGNIFICANT CHANGE UP (ref 81–99)
MONOCYTES # BLD AUTO: 1.08 K/UL — HIGH (ref 0.1–0.6)
MONOCYTES NFR BLD AUTO: 7.6 % — SIGNIFICANT CHANGE UP (ref 1.7–9.3)
NEUTROPHILS # BLD AUTO: 10.15 K/UL — HIGH (ref 1.4–6.5)
NEUTROPHILS NFR BLD AUTO: 71.3 % — SIGNIFICANT CHANGE UP (ref 42.2–75.2)
NRBC # BLD: 0 /100 WBCS — SIGNIFICANT CHANGE UP (ref 0–0)
PLATELET # BLD AUTO: 281 K/UL — SIGNIFICANT CHANGE UP (ref 130–400)
POTASSIUM SERPL-MCNC: 4.8 MMOL/L — SIGNIFICANT CHANGE UP (ref 3.5–5)
POTASSIUM SERPL-SCNC: 4.8 MMOL/L — SIGNIFICANT CHANGE UP (ref 3.5–5)
PROT SERPL-MCNC: 6 G/DL — SIGNIFICANT CHANGE UP (ref 6–8)
RBC # BLD: 2.53 M/UL — LOW (ref 4.2–5.4)
RBC # FLD: 17.2 % — HIGH (ref 11.5–14.5)
SODIUM SERPL-SCNC: 132 MMOL/L — LOW (ref 135–146)
WBC # BLD: 14.25 K/UL — HIGH (ref 4.8–10.8)
WBC # FLD AUTO: 14.25 K/UL — HIGH (ref 4.8–10.8)

## 2021-03-27 PROCEDURE — 99233 SBSQ HOSP IP/OBS HIGH 50: CPT

## 2021-03-27 RX ADMIN — Medication 1 TABLET(S): at 11:04

## 2021-03-27 RX ADMIN — PANTOPRAZOLE SODIUM 40 MILLIGRAM(S): 20 TABLET, DELAYED RELEASE ORAL at 05:49

## 2021-03-27 RX ADMIN — MORPHINE SULFATE 4 MILLIGRAM(S): 50 CAPSULE, EXTENDED RELEASE ORAL at 17:29

## 2021-03-27 RX ADMIN — POLYETHYLENE GLYCOL 3350 17 GRAM(S): 17 POWDER, FOR SOLUTION ORAL at 11:04

## 2021-03-27 RX ADMIN — MORPHINE SULFATE 4 MILLIGRAM(S): 50 CAPSULE, EXTENDED RELEASE ORAL at 23:38

## 2021-03-27 RX ADMIN — MORPHINE SULFATE 4 MILLIGRAM(S): 50 CAPSULE, EXTENDED RELEASE ORAL at 23:37

## 2021-03-27 RX ADMIN — CHLORHEXIDINE GLUCONATE 1 APPLICATION(S): 213 SOLUTION TOPICAL at 05:49

## 2021-03-27 RX ADMIN — MORPHINE SULFATE 4 MILLIGRAM(S): 50 CAPSULE, EXTENDED RELEASE ORAL at 01:29

## 2021-03-27 RX ADMIN — MORPHINE SULFATE 4 MILLIGRAM(S): 50 CAPSULE, EXTENDED RELEASE ORAL at 01:34

## 2021-03-27 RX ADMIN — Medication 500 MILLIGRAM(S): at 11:04

## 2021-03-27 RX ADMIN — MORPHINE SULFATE 4 MILLIGRAM(S): 50 CAPSULE, EXTENDED RELEASE ORAL at 11:06

## 2021-03-27 RX ADMIN — MORPHINE SULFATE 4 MILLIGRAM(S): 50 CAPSULE, EXTENDED RELEASE ORAL at 05:49

## 2021-03-27 RX ADMIN — MORPHINE SULFATE 4 MILLIGRAM(S): 50 CAPSULE, EXTENDED RELEASE ORAL at 06:05

## 2021-03-27 RX ADMIN — ENOXAPARIN SODIUM 40 MILLIGRAM(S): 100 INJECTION SUBCUTANEOUS at 11:04

## 2021-03-27 NOTE — PROGRESS NOTE ADULT - SUBJECTIVE AND OBJECTIVE BOX
DOV HENLEY 70y Female  MRN#: [id number]   Hospital Day: 4d    SUBJECTIVE  Patient is a 70y old Female who presents with a chief complaint of Lung CA (26 Mar 2021 12:34)  Currently admitted to medicine with the primary diagnosis of   INTERVAL HPI AND OVERNIGHT EVENTS:  Patient was examined and seen at bedside. This morning she is resting comfortably in bed.    REVIEW OF SYMPTOMS:  CONSTITUTIONAL: No weakness, fevers or chills; No headaches  EYES: No visual changes, eye pain, or discharge  ENT: No vertigo; No ear pain or change in hearing; No sore throat or difficulty swallowing  NECK: No pain or stiffness  RESPIRATORY: No cough, wheezing, or hemoptysis; No shortness of breath  CARDIOVASCULAR: No chest pain or palpitations  GASTROINTESTINAL: No abdominal or epigastric pain; No nausea, vomiting, or hematemesis; No diarrhea or constipation; No melena or hematochezia  GENITOURINARY: No dysuria, frequency or hematuria  MUSCULOSKELETAL: No joint pain, no muscle pain, no weakness  NEUROLOGICAL: No numbness or weakness  SKIN: No itching or rashes    OBJECTIVE  PAST MEDICAL & SURGICAL HISTORY  Diverticulosis    Gout    OA (osteoarthritis)    H/O total hysterectomy      ALLERGIES:  moxifloxacin (Anaphylaxis (Moderate))    MEDICATIONS:  STANDING MEDICATIONS  ascorbic acid 500 milliGRAM(s) Oral daily  chlorhexidine 4% Liquid 1 Application(s) Topical <User Schedule>  enoxaparin Injectable 40 milliGRAM(s) SubCutaneous daily  morphine  - Injectable 4 milliGRAM(s) IV Push every 6 hours  multivitamin 1 Tablet(s) Oral daily  pantoprazole    Tablet 40 milliGRAM(s) Oral before breakfast  polyethylene glycol 3350 17 Gram(s) Oral daily  senna 2 Tablet(s) Oral at bedtime    PRN MEDICATIONS  acetaminophen   Tablet .. 650 milliGRAM(s) Oral every 6 hours PRN  lidocaine   Patch 1 Patch Transdermal daily PRN  morphine  - Injectable 4 milliGRAM(s) IV Push every 3 hours PRN      VITAL SIGNS: Last 24 Hours  T(C): 36.3 (27 Mar 2021 07:35), Max: 37 (26 Mar 2021 15:36)  T(F): 97.4 (27 Mar 2021 07:35), Max: 98.6 (26 Mar 2021 15:36)  HR: 100 (27 Mar 2021 11:05) (100 - 109)  BP: 116/75 (27 Mar 2021 11:05) (101/68 - 119/60)  BP(mean): --  RR: 20 (27 Mar 2021 11:05) (20 - 21)  SpO2: 99% (27 Mar 2021 11:05) (97% - 99%)    LABS:                        8.3    14.25 )-----------( 281      ( 27 Mar 2021 06:53 )             24.5     03-27    132<L>  |  90<L>  |  20  ----------------------------<  101<H>  4.8   |  31  |  0.6<L>    Ca    10.1      27 Mar 2021 06:53  Mg     2.1     03-27    TPro  6.0  /  Alb  2.9<L>  /  TBili  1.2  /  DBili  x   /  AST  24  /  ALT  10  /  AlkPhos  166<H>  03-27              Culture - Acid Fast - Body Fluid w/Smear (collected 25 Mar 2021 11:30)  Source: .Body Fluid None    Culture - Body Fluid with Gram Stain (collected 25 Mar 2021 11:30)  Source: .Body Fluid None  Gram Stain (26 Mar 2021 22:39):    No polymorphonuclear cells seen per low power field    No organisms seen per oil power field          RADIOLOGY:      PHYSICAL EXAM:  CONSTITUTIONAL: No acute distress, ill appearing, well-developed, AAOx3, Appears more awake and comfortable from yesterday  HEAD: Atraumatic, normocephalic  EYES: EOMI  ENT: Supple  PULMONARY: Markedly diminished R sided breath sounds  CARDIOVASCULAR: Regular rate and rhythm  GASTROINTESTINAL: Soft, non-tender, non-distended  MUSCULOSKELETAL: Chest wall tenderness to palpation  NEUROLOGY: non-focal  SKIN: intact    ASSESSMENT & PLAN  70 years old female from home and lives alone with PMHx of OA of bilateral knees, right rotator ruff tear (?), gout, diverticulosis, presented with difficulty ambulation due to worsening pain on hips and shoulders. Pt found to have femoral neck fracture, transferred to [facility] for surgery and since transferred back to [facility] for further oncological workup and PleurX catheter placement    # Right Perihilar Mass w/ large left pleural effusion  # Pulmonary adenoCA with metastasis   - CT Chest No Cont (03.14.21 @ 18:33): Poorly defined malignant right upper lobe mass extending into the right hilum and mediastinum with occlusion of the right upper lobe airways. Metastatic osteolytic right scapular lesion. Acute mildly displaced left glenoid fracture and age-indeterminate T11 compression deformity, underlying pathologic fractures are not excluded. Left greater than right bilateral adrenal nodularity, likely metastatic.  - s/p thoracentesis 3/15 (drained 1L): +ve for malignant cells (adenocarcinoma)   - NM Bone scan showed "Multiple bony abnormalities which by pattern of distribution and correlation with CT findings are consistent with metastatic bone disease.  Metastatic sites  include right and left ilium, right sacroiliac joint, L2, T8, T11 (photopenic) and the right and left femoral intertrochanteric ridges."  - Pulmonary following--s/p PleurX catheter 3/25, drainage per pulm  - f/u CXR showed rt PTX, s/w pulm, normal sequela post procedure without any need for any acute interventions  - MRI performed--shows diffuse mets including brain  - Heme/Onc following--appreciate recs  - f/u Rad/Onc as outpt 3-4 wks  - Pain regimen per palliative team  - Pt currently on NRB with SpO2 100%, transferred to NC @6LPM with SpO2 >96%, but pt feels more secure with NRB    # Nondisplaced femoral neck subcapital impaction fracture s/p corrective surgery at [facility]  - Pain control with Tylenol   - CT Pelvis Bony Only No Cont (03.14.21 @ 22:01): Multiple infiltrative bone lesions, largest at the right iliac crest with nondisplaced pathologic fracture and soft tissue mass extension. Findings are compatible with osseous metastasis. Consider nuclear medicine bone scan for the assessment of skeletal metastasis. Nondisplaced right subcapital femoral neck impaction fracture. Consider MRI of the right hip to evaluate extent of disease.  - Ortho following--appreciate recs    #Hyponatremia--possibly due to malignancy  - fluid restriction 1 L  - follow up BMP     # Mild hypercalcemia - resolved   - Ca 10.4, albumin 4.4  - Likely secondary to dehydration (patient reports not eating or drinking much because it was difficult for her to get around)    # Gout  - Not on any medication  - Will avoid red meat diet while inpatient    DVT ppx: Lovenox 40mg qd  GI ppx: Protonix  Diet: Regular  Ambulatory status: as tolerated,   Code status: DNR/I    PAST MEDICAL & SURGICAL HISTORY:  Diverticulosis    Gout    OA (osteoarthritis)    H/O total hysterectomy     DOV HENLEY 70y Female  MRN#: [id number]   Hospital Day: 4d    SUBJECTIVE  Patient is a 70y old Female who presents with a chief complaint of Lung CA (26 Mar 2021 12:34)  Currently admitted to medicine with the primary diagnosis of     INTERVAL HPI AND OVERNIGHT EVENTS:  Patient was examined and seen at bedside. This morning she is resting comfortably in bed.    REVIEW OF SYMPTOMS:  +back pain, + "itchiness" at PleurX insertion site    OBJECTIVE  PAST MEDICAL & SURGICAL HISTORY  Diverticulosis    Gout    OA (osteoarthritis)    H/O total hysterectomy      ALLERGIES:  moxifloxacin (Anaphylaxis (Moderate))    MEDICATIONS:  STANDING MEDICATIONS  ascorbic acid 500 milliGRAM(s) Oral daily  chlorhexidine 4% Liquid 1 Application(s) Topical <User Schedule>  enoxaparin Injectable 40 milliGRAM(s) SubCutaneous daily  morphine  - Injectable 4 milliGRAM(s) IV Push every 6 hours  multivitamin 1 Tablet(s) Oral daily  pantoprazole    Tablet 40 milliGRAM(s) Oral before breakfast  polyethylene glycol 3350 17 Gram(s) Oral daily  senna 2 Tablet(s) Oral at bedtime    PRN MEDICATIONS  acetaminophen   Tablet .. 650 milliGRAM(s) Oral every 6 hours PRN  lidocaine   Patch 1 Patch Transdermal daily PRN  morphine  - Injectable 4 milliGRAM(s) IV Push every 3 hours PRN      VITAL SIGNS: Last 24 Hours  T(C): 36.3 (27 Mar 2021 07:35), Max: 37 (26 Mar 2021 15:36)  T(F): 97.4 (27 Mar 2021 07:35), Max: 98.6 (26 Mar 2021 15:36)  HR: 100 (27 Mar 2021 11:05) (100 - 109)  BP: 116/75 (27 Mar 2021 11:05) (101/68 - 119/60)  BP(mean): --  RR: 20 (27 Mar 2021 11:05) (20 - 21)  SpO2: 99% (27 Mar 2021 11:05) (97% - 99%)    LABS:                        8.3    14.25 )-----------( 281      ( 27 Mar 2021 06:53 )             24.5     03-27    132<L>  |  90<L>  |  20  ----------------------------<  101<H>  4.8   |  31  |  0.6<L>    Ca    10.1      27 Mar 2021 06:53  Mg     2.1     03-27    TPro  6.0  /  Alb  2.9<L>  /  TBili  1.2  /  DBili  x   /  AST  24  /  ALT  10  /  AlkPhos  166<H>  03-27              Culture - Acid Fast - Body Fluid w/Smear (collected 25 Mar 2021 11:30)  Source: .Body Fluid None    Culture - Body Fluid with Gram Stain (collected 25 Mar 2021 11:30)  Source: .Body Fluid None  Gram Stain (26 Mar 2021 22:39):    No polymorphonuclear cells seen per low power field    No organisms seen per oil power field          RADIOLOGY:      PHYSICAL EXAM:  CONSTITUTIONAL: No acute distress, ill appearing, well-developed, AAOx3, Appears more awake and comfortable from yesterday  HEAD: Atraumatic, normocephalic  EYES: EOMI  ENT: Supple  PULMONARY: Markedly diminished R sided breath sounds  CARDIOVASCULAR: Regular rate and rhythm  GASTROINTESTINAL: Soft, non-tender, non-distended  MUSCULOSKELETAL: Chest wall tenderness to palpation, LL swelling BL R>L  NEUROLOGY: non-focal  SKIN: intact    ASSESSMENT & PLAN  70 years old female from home and lives alone with PMHx of OA of bilateral knees, right rotator ruff tear (?), gout, diverticulosis, presented with difficulty ambulation due to worsening pain on hips and shoulders. Pt found to have femoral neck fracture, transferred to [facility] for surgery and since transferred back to [facility] for further oncological workup and PleurX catheter placement    # Right Perihilar Mass w/ large left pleural effusion  # Pulmonary adenoCA with metastasis   - CT Chest No Cont (03.14.21 @ 18:33): Poorly defined malignant right upper lobe mass extending into the right hilum and mediastinum with occlusion of the right upper lobe airways. Metastatic osteolytic right scapular lesion. Acute mildly displaced left glenoid fracture and age-indeterminate T11 compression deformity, underlying pathologic fractures are not excluded. Left greater than right bilateral adrenal nodularity, likely metastatic.  - s/p thoracentesis 3/15 (drained 1L): +ve for malignant cells (adenocarcinoma)   - NM Bone scan showed "Multiple bony abnormalities which by pattern of distribution and correlation with CT findings are consistent with metastatic bone disease.  Metastatic sites  include right and left ilium, right sacroiliac joint, L2, T8, T11 (photopenic) and the right and left femoral intertrochanteric ridges."  - Pulmonary following--s/p PleurX catheter 3/25, drainage per pulm  - f/u CXR showed rt PTX, s/w pulm, normal sequela post procedure without any need for any acute interventions  - MRI performed--shows diffuse mets including brain  - Heme/Onc following--appreciate recs  - f/u Rad/Onc as outpt 3-4 wks  - Pain regimen per palliative team  - Pt currently on NRB with SpO2 100%, transferred to NC @6LPM with SpO2 >96%, but pt feels more secure with NRB    # Nondisplaced femoral neck subcapital impaction fracture s/p corrective surgery at [facility]  - Pain control with Tylenol   - CT Pelvis Bony Only No Cont (03.14.21 @ 22:01): Multiple infiltrative bone lesions, largest at the right iliac crest with nondisplaced pathologic fracture and soft tissue mass extension. Findings are compatible with osseous metastasis. Consider nuclear medicine bone scan for the assessment of skeletal metastasis. Nondisplaced right subcapital femoral neck impaction fracture. Consider MRI of the right hip to evaluate extent of disease.  - Ortho following--appreciate recs    #Hyponatremia--possibly due to malignancy  - fluid restriction 1 L  - follow up BMP     # Mild hypercalcemia - resolved   - Ca 10.4, albumin 4.4  - Likely secondary to dehydration (patient reports not eating or drinking much because it was difficult for her to get around)    # Gout  - Not on any medication  - Will avoid red meat diet while inpatient    DVT ppx: Lovenox 40mg qd  GI ppx: Protonix  Diet: Regular  Ambulatory status: as tolerated,   Code status: DNR/I    PAST MEDICAL & SURGICAL HISTORY:  Diverticulosis    Gout    OA (osteoarthritis)    H/O total hysterectomy

## 2021-03-28 PROCEDURE — 93970 EXTREMITY STUDY: CPT | Mod: 26

## 2021-03-28 PROCEDURE — 99233 SBSQ HOSP IP/OBS HIGH 50: CPT

## 2021-03-28 RX ADMIN — MORPHINE SULFATE 4 MILLIGRAM(S): 50 CAPSULE, EXTENDED RELEASE ORAL at 11:23

## 2021-03-28 RX ADMIN — MORPHINE SULFATE 4 MILLIGRAM(S): 50 CAPSULE, EXTENDED RELEASE ORAL at 17:39

## 2021-03-28 RX ADMIN — Medication 500 MILLIGRAM(S): at 11:23

## 2021-03-28 RX ADMIN — MORPHINE SULFATE 4 MILLIGRAM(S): 50 CAPSULE, EXTENDED RELEASE ORAL at 23:19

## 2021-03-28 RX ADMIN — PANTOPRAZOLE SODIUM 40 MILLIGRAM(S): 20 TABLET, DELAYED RELEASE ORAL at 05:33

## 2021-03-28 RX ADMIN — ENOXAPARIN SODIUM 40 MILLIGRAM(S): 100 INJECTION SUBCUTANEOUS at 11:23

## 2021-03-28 RX ADMIN — Medication 1 TABLET(S): at 11:23

## 2021-03-28 RX ADMIN — MORPHINE SULFATE 4 MILLIGRAM(S): 50 CAPSULE, EXTENDED RELEASE ORAL at 18:09

## 2021-03-28 RX ADMIN — SENNA PLUS 2 TABLET(S): 8.6 TABLET ORAL at 22:26

## 2021-03-28 RX ADMIN — MORPHINE SULFATE 4 MILLIGRAM(S): 50 CAPSULE, EXTENDED RELEASE ORAL at 05:33

## 2021-03-28 RX ADMIN — POLYETHYLENE GLYCOL 3350 17 GRAM(S): 17 POWDER, FOR SOLUTION ORAL at 11:23

## 2021-03-28 NOTE — CHART NOTE - NSCHARTNOTEFT_GEN_A_CORE
pt has an isolated DVT in Gastrocnemial vein. hb 8. dropping from baseline.   Hemoglobin Trend:  Hemoglobin: 8.3 g/dL (03-27 @ 06:53)  Hemoglobin: 7.8 g/dL (03-26 @ 07:18)  from 13.2 to 8.3.   Pt has 1. Nondisplaced femoral neck subcapital impaction fracture  2. Stage IV Lung Adenocarcinoma with RUL mass extending into right hilum  3. Large right malignant pleural effusion s/p thora, positive for adenocarcinoma.     pt is on Lovenox 40 mg sc qd for DV T ppx.   pt is not in distress, and VS are stable.   Decision to anticoagulate has to be weighed for risks vs benefits especially that hb is dropping.   Attempted to speak to family, no answer. please address in AM

## 2021-03-28 NOTE — PROGRESS NOTE ADULT - ASSESSMENT
A/P:-  Pt is seen and evaluated by bedside.   1. Nondisplaced femoral neck subcapital impaction fracture  2. Stage IV Lung Adenocarcinoma with RUL mass extending into right hilum  3. Large right malignant pleural effusion s/p thora, positive for adenocarcinoma  4. ambulatory dysfunction   5. gout   6. obestiy     - patient presented with ambulatory dysfunction 2/2 chronic pain and dyspnea with minimal exertion   - patient was transferred back from [facility] after hip surgery   - thora resulted - adenocarcinoma   - loculated pleural effusion   - s/p Pleurx placement - total 2L of fluids were taken out over last 48 hours  - expansion of right lung noted on post procedure xray   - on NRB   - taper down as tolerated   - MRI - Diffuse osseous metastasis are noted throughout the thoracic and lumbar spines. 1 cm enhancement in the right paracentral splenium of the corpus callosum with surrounding edema likely representing a metastatic lesion. No other intraparenchymal abnormal enhancement allowing for limitation from motion. (see details in results)  -  path from femoral head - - Metastatic adenocarcinoma involving bone and bone marrow; immunostains are pending  - path from pleural tap- Immunohistochemical stains are performed and show the tumor is  positive for CK7, negative for CK20, Cordell-EP4, TTF-1, CDX2, PAX-8; few cells are positive for calretinin, most likely representing reactive mesothelial cells.  - plan for targeted/immunotherapy - follow with hem/onc   - pain meds adjusted as per palliative recs   - HCP formed filled with palliative at bedside   - lovenox .      Handoff:  Pending: final biopsy results from biopsy, hem/onc follow up   Dispo: from home, unclear dispo planning based on further treatment plan  A/P:-  Pt is seen and evaluated by bedside.   1. Nondisplaced femoral neck subcapital impaction fracture  2. Stage IV Lung Adenocarcinoma with RUL mass extending into right hilum  3. Large right malignant pleural effusion s/p thora, positive for adenocarcinoma  4. ambulatory dysfunction   5. gout   6. obestiy     - patient presented with ambulatory dysfunction 2/2 chronic pain and dyspnea with minimal exertion   - patient was transferred back from [facility] after hip surgery   - thora resulted - adenocarcinoma   - loculated pleural effusion   - s/p Pleurx placement - total 2L of fluids were taken out over last 48 hours  - expansion of right lung noted on post procedure xray   - off NRB --> now on NC   - taper down as tolerated   - MRI - Diffuse osseous metastasis are noted throughout the thoracic and lumbar spines. 1 cm enhancement in the right paracentral splenium of the corpus callosum with surrounding edema likely representing a metastatic lesion. No other intraparenchymal abnormal enhancement allowing for limitation from motion. (see details in results)  -  path from femoral head - - Metastatic adenocarcinoma involving bone and bone marrow; immunostains are pending  - path from pleural tap- Immunohistochemical stains are performed and show the tumor is  positive for CK7, negative for CK20, Cordell-EP4, TTF-1, CDX2, PAX-8; few cells are positive for calretinin, most likely representing reactive mesothelial cells.  - plan for targeted/immunotherapy - follow with hem/onc   - pain meds adjusted as per palliative recs   - HCP formed filled with palliative at bedside   - lovenox .      Handoff:  Pending: final biopsy results from biopsy, hem/onc follow up   Dispo: from home, unclear dispo planning based on further treatment plan   Family discussion - attempted to call aretha - unable to reach at this time

## 2021-03-28 NOTE — PROGRESS NOTE ADULT - SUBJECTIVE AND OBJECTIVE BOX
Autumn Allan MD  Hospitalist   Spectra: [phone]    Patient is a 70y old  Female who presents with a chief complaint of Lung cancer (27 Mar 2021 13:13)      INTERVAL HPI/OVERNIGHT EVENTS:     REVIEW OF SYSTEMS: negative  Vital Signs Last 24 Hrs  T(C): 35.6 (28 Mar 2021 07:25), Max: 37.1 (28 Mar 2021 00:00)  T(F): 96.1 (28 Mar 2021 07:25), Max: 98.7 (28 Mar 2021 00:00)  HR: 104 (28 Mar 2021 07:25) (90 - 105)  BP: 110/75 (28 Mar 2021 07:25) (92/56 - 116/75)  BP(mean): --  RR: 18 (28 Mar 2021 07:25) (18 - 20)  SpO2: 98% (28 Mar 2021 07:25) (96% - 100%)    PHYSICAL EXAM:   NAD; Normocephalic;   LUNGS - no wheezing  HEART: S1 S2+   ABDOMEN: Soft, Nontender, non distended  EXTREMITIES: no cyanosis; no edema  NERVOUS SYSTEM:  Awake and alert; no focal neuro deficits appreciated  right chest tube in place     LABS:                        8.3    14.25 )-----------( 281      ( 27 Mar 2021 06:53 )             24.5     03-27    132<L>  |  90<L>  |  20  ----------------------------<  101<H>  4.8   |  31  |  0.6<L>    Ca    10.1      27 Mar 2021 06:53  Mg     2.1     03-27    TPro  6.0  /  Alb  2.9<L>  /  TBili  1.2  /  DBili  x   /  AST  24  /  ALT  10  /  AlkPhos  166<H>  03-27       Autumn Allan MD  Hospitalist   Spectra: [phone]    Patient is a 70y old  Female who presents with a chief complaint of Lung cancer (27 Mar 2021 13:13)      INTERVAL HPI/OVERNIGHT EVENTS: no acute overnight events noted   comfortably sitting in bed - having lunch   now off NRB, currently on NC     REVIEW OF SYSTEMS: negative  Vital Signs Last 24 Hrs  T(C): 35.6 (28 Mar 2021 07:25), Max: 37.1 (28 Mar 2021 00:00)  T(F): 96.1 (28 Mar 2021 07:25), Max: 98.7 (28 Mar 2021 00:00)  HR: 104 (28 Mar 2021 07:25) (90 - 105)  BP: 110/75 (28 Mar 2021 07:25) (92/56 - 116/75)  BP(mean): --  RR: 18 (28 Mar 2021 07:25) (18 - 20)  SpO2: 98% (28 Mar 2021 07:25) (96% - 100%)    PHYSICAL EXAM:   NAD; Normocephalic;   LUNGS - no wheezing  HEART: S1 S2+   ABDOMEN: Soft, Nontender, non distended  EXTREMITIES: no cyanosis; no edema  NERVOUS SYSTEM:  Awake and alert; no focal neuro deficits appreciated  right chest tube in place     LABS:                        8.3    14.25 )-----------( 281      ( 27 Mar 2021 06:53 )             24.5     03-27    132<L>  |  90<L>  |  20  ----------------------------<  101<H>  4.8   |  31  |  0.6<L>    Ca    10.1      27 Mar 2021 06:53  Mg     2.1     03-27    TPro  6.0  /  Alb  2.9<L>  /  TBili  1.2  /  DBili  x   /  AST  24  /  ALT  10  /  AlkPhos  166<H>  03-27

## 2021-03-29 LAB — NON-GYNECOLOGICAL CYTOLOGY STUDY: SIGNIFICANT CHANGE UP

## 2021-03-29 PROCEDURE — 99233 SBSQ HOSP IP/OBS HIGH 50: CPT

## 2021-03-29 RX ORDER — ENOXAPARIN SODIUM 100 MG/ML
80 INJECTION SUBCUTANEOUS EVERY 12 HOURS
Refills: 0 | Status: DISCONTINUED | OUTPATIENT
Start: 2021-03-29 | End: 2021-04-01

## 2021-03-29 RX ORDER — ONDANSETRON 8 MG/1
4 TABLET, FILM COATED ORAL ONCE
Refills: 0 | Status: COMPLETED | OUTPATIENT
Start: 2021-03-29 | End: 2021-03-29

## 2021-03-29 RX ADMIN — MORPHINE SULFATE 4 MILLIGRAM(S): 50 CAPSULE, EXTENDED RELEASE ORAL at 20:17

## 2021-03-29 RX ADMIN — MORPHINE SULFATE 4 MILLIGRAM(S): 50 CAPSULE, EXTENDED RELEASE ORAL at 23:24

## 2021-03-29 RX ADMIN — MORPHINE SULFATE 4 MILLIGRAM(S): 50 CAPSULE, EXTENDED RELEASE ORAL at 05:24

## 2021-03-29 RX ADMIN — Medication 1 TABLET(S): at 11:05

## 2021-03-29 RX ADMIN — Medication 500 MILLIGRAM(S): at 11:05

## 2021-03-29 RX ADMIN — SENNA PLUS 2 TABLET(S): 8.6 TABLET ORAL at 23:24

## 2021-03-29 RX ADMIN — POLYETHYLENE GLYCOL 3350 17 GRAM(S): 17 POWDER, FOR SOLUTION ORAL at 11:06

## 2021-03-29 RX ADMIN — CHLORHEXIDINE GLUCONATE 1 APPLICATION(S): 213 SOLUTION TOPICAL at 06:47

## 2021-03-29 RX ADMIN — ONDANSETRON 4 MILLIGRAM(S): 8 TABLET, FILM COATED ORAL at 02:38

## 2021-03-29 RX ADMIN — LIDOCAINE 1 PATCH: 4 CREAM TOPICAL at 21:03

## 2021-03-29 RX ADMIN — MORPHINE SULFATE 4 MILLIGRAM(S): 50 CAPSULE, EXTENDED RELEASE ORAL at 14:45

## 2021-03-29 RX ADMIN — PANTOPRAZOLE SODIUM 40 MILLIGRAM(S): 20 TABLET, DELAYED RELEASE ORAL at 05:24

## 2021-03-29 RX ADMIN — MORPHINE SULFATE 4 MILLIGRAM(S): 50 CAPSULE, EXTENDED RELEASE ORAL at 14:23

## 2021-03-29 RX ADMIN — LIDOCAINE 1 PATCH: 4 CREAM TOPICAL at 09:34

## 2021-03-29 RX ADMIN — ENOXAPARIN SODIUM 80 MILLIGRAM(S): 100 INJECTION SUBCUTANEOUS at 17:12

## 2021-03-29 NOTE — PROGRESS NOTE ADULT - ASSESSMENT
70yFemale being evaluated for goals of care and symptom management. Pt is alert today and conversant. Said her pain is still there sometimes but better controlled. SHe is anxious about taking opioids counselling provided. Explained need to control pain. Pt verbalized agreement.     Pt complained of not moving her bowels. Pt on bowel regimen, discussed using a suppository today and she agreed.     above discussed with medical resident     MEDD (morphine equivalent daily dose): 72mg/24hrs       See Recs below.  - DNR/DNI  - Morphine 4mg Iv q 6 hrs RTC  - Morphine 4mg IV q 4 hrs PRN for pain or dyspnea  - WIll convert to oral prior to d/c   - Mirlax and Senna in place   - Add Dulcolax suppository QD PRN to bowel regimen      Please call [phone] with questions or concerns 24/7.   We will continue to follow.      Problem/Recommendation - 1:  ·  Problem: Bone metastases.  Recommendation: -Diffuse bone mets causing pain  -increase morphine to 4mg IV Q3h PRN  -Add morphine 4 mg IV Q6h ATC.   -  Convert to oral before d/c      Problem/Recommendation - 2:  ·  Problem: Chronic midline low back pain without sciatica.  Recommendation: - due to bone mets  - Dc tramadol  -Increase morphine to 4 mg IV Q4h PRN  -Add morphine 4 mg Iv Q6h ATC.      Problem/Recommendation - 3:  ·  Problem: Palliative care by specialist.  Recommendation: Patient seen for pain control. Pt is DNR/DNI, very poor prognosis. Spoke to heme/onc and primary team requested to update pt and family on current prognosis and options. Palliative care available for family meeting.   Recs as above.     Problem/Recommendation - 4:  ·	Problem: Constipation - Senna, mirlax, add Dulcolax supp Q D PRN. Monitor BMs Palliative and primary team following

## 2021-03-29 NOTE — PROGRESS NOTE ADULT - SUBJECTIVE AND OBJECTIVE BOX
DOV HENLEY             MRN-[id number]    CC: hip arthroplasty / pain     HPI: metastatic lung cancer, bony mets      SUBJECTIVE:    ROS:  DYSPNEA: Yes  NAUS/VOM: No 	  SECRETIONS: nO	  AGITATION: no   Pain: yes   -Provocation/Palliation: movement   -Quality/Quantity: stabbing, burning   -Radiating: yes   -Severity: 7/10  -Timing/Frequency: daily   -Impact on ADLs: pt cannot tolerate walking   PEx:  70y  General: AAOx3    found in bed in NAD, fatigued and weak   HEENT:  NCAT PERRL EOMI Non icteric MOM  Neck: Supple no masses  CVS: RR S1S2 edema general   Resp: Unlabored (+) tachypneic . decreased BS on right   GI:  Soft NT ND BS+  :   [place]  Musc: No C/C/E    Neuro: Follows commands No focal deficits  Psych: Calm Pleasant  Skin: Non jaundiced   Lymph: Normal               Last BM: over 3 days       ALLERGIES:     OPIATE NAÏVE (Y/N):    MEDICATIONS: REVIEWED  MEDICATIONS  (STANDING):  ascorbic acid 500 milliGRAM(s) Oral daily  chlorhexidine 4% Liquid 1 Application(s) Topical <User Schedule>  enoxaparin Injectable 80 milliGRAM(s) SubCutaneous every 12 hours  morphine  - Injectable 4 milliGRAM(s) IV Push every 6 hours  multivitamin 1 Tablet(s) Oral daily  pantoprazole    Tablet 40 milliGRAM(s) Oral before breakfast  polyethylene glycol 3350 17 Gram(s) Oral daily  senna 2 Tablet(s) Oral at bedtime    MEDICATIONS  (PRN):  acetaminophen   Tablet .. 650 milliGRAM(s) Oral every 6 hours PRN Mild Pain (1 - 3)  bisacodyl Suppository 10 milliGRAM(s) Rectal daily PRN Constipation  lidocaine   Patch 1 Patch Transdermal daily PRN Back pain  morphine  - Injectable 4 milliGRAM(s) IV Push every 3 hours PRN mild to severe pain      LABS: REVIEWED  CBC:    CMP:        Albumin, Serum: 2.9 g/dL (03-27-21 @ 06:53)      IMAGING: REVIEWED    ADVANCED DIRECTIVES:       DNR/DNI     DECISION MAKER: Pt makes her own decisions   LEGAL SURROGATE: HCP her sisters Carmita and Yaritza copy in chart     PSYCHOSOCIAL-SPIRITUAL ASSESSMENT:       Reviewed       Care plan unchanged       Care plan adjusted as above	    GOALS OF CARE DISCUSSION       Palliative care info/counseling provided	           Family meeting       Advanced Directives addressed please see Advance Care Planning Note	           See previous Palliative Medicine Note       Documentation of GOC:    CURRENT DISPO PLAN:     D/C home vs SNF w PT      REFERRALS	        Palliative Med        Unit SW/Case Mikey

## 2021-03-29 NOTE — PROGRESS NOTE ADULT - SUBJECTIVE AND OBJECTIVE BOX
DOV HENLEY 70y Female  MRN#: [id number]   Hospital Day: 6d    SUBJECTIVE  Patient is a 70y old Female who presents with a chief complaint of Lung cancer (27 Mar 2021 13:13)  Currently admitted to medicine with the primary diagnosis of   INTERVAL HPI AND OVERNIGHT EVENTS:  Patient was examined and seen at bedside. This morning she is resting comfortably in bed.    REVIEW OF SYMPTOMS:  +back pain, + "itchiness"/discomfort at PleurX insertion site    OBJECTIVE  PAST MEDICAL & SURGICAL HISTORY  Diverticulosis    Gout    OA (osteoarthritis)    H/O total hysterectomy      ALLERGIES:  moxifloxacin (Anaphylaxis (Moderate))    MEDICATIONS:  STANDING MEDICATIONS  ascorbic acid 500 milliGRAM(s) Oral daily  chlorhexidine 4% Liquid 1 Application(s) Topical <User Schedule>  enoxaparin Injectable 80 milliGRAM(s) SubCutaneous every 12 hours  morphine  - Injectable 4 milliGRAM(s) IV Push every 6 hours  multivitamin 1 Tablet(s) Oral daily  pantoprazole    Tablet 40 milliGRAM(s) Oral before breakfast  polyethylene glycol 3350 17 Gram(s) Oral daily  senna 2 Tablet(s) Oral at bedtime    PRN MEDICATIONS  acetaminophen   Tablet .. 650 milliGRAM(s) Oral every 6 hours PRN  bisacodyl Suppository 10 milliGRAM(s) Rectal daily PRN  lidocaine   Patch 1 Patch Transdermal daily PRN  morphine  - Injectable 4 milliGRAM(s) IV Push every 3 hours PRN      VITAL SIGNS: Last 24 Hours  T(C): 36.6 (29 Mar 2021 09:16), Max: 36.6 (29 Mar 2021 09:16)  T(F): 97.8 (29 Mar 2021 09:16), Max: 97.8 (29 Mar 2021 09:16)  HR: 106 (29 Mar 2021 11:35) (100 - 109)  BP: 108/75 (29 Mar 2021 11:35) (93/55 - 108/75)  BP(mean): --  RR: 18 (29 Mar 2021 11:35) (18 - 18)  SpO2: 98% (29 Mar 2021 11:35) (98% - 98%)    LABS:                        RADIOLOGY:      PHYSICAL EXAM:  CONSTITUTIONAL: No acute distress, ill appearing, well-developed, AAOx3  HEAD: Atraumatic, normocephalic  EYES: EOMI  ENT: Supple  PULMONARY: Markedly diminished R sided breath sounds  CARDIOVASCULAR: Regular rate and rhythm  GASTROINTESTINAL: Soft, non-tender, non-distended  MUSCULOSKELETAL: Chest wall tenderness to palpation, LL swelling BL R>L  NEUROLOGY: non-focal  SKIN: intact    ASSESSMENT & PLAN  70 years old female from home and lives alone with PMHx of OA of bilateral knees, right rotator ruff tear (?), gout, diverticulosis, presented with difficulty ambulation due to worsening pain on hips and shoulders. Pt found to have femoral neck fracture, transferred to [facility] for surgery and since transferred back to [facility] for further oncological workup and PleurX catheter placement    # Right Perihilar Mass w/ large left pleural effusion  # Pulmonary adenoCA with metastasis   - CT Chest No Cont (03.14.21 @ 18:33): Poorly defined malignant right upper lobe mass extending into the right hilum and mediastinum with occlusion of the right upper lobe airways. Metastatic osteolytic right scapular lesion. Acute mildly displaced left glenoid fracture and age-indeterminate T11 compression deformity, underlying pathologic fractures are not excluded. Left greater than right bilateral adrenal nodularity, likely metastatic.  - s/p thoracentesis 3/15 (drained 1L): +ve for malignant cells (adenocarcinoma)   - NM Bone scan showed "Multiple bony abnormalities which by pattern of distribution and correlation with CT findings are consistent with metastatic bone disease.  Metastatic sites  include right and left ilium, right sacroiliac joint, L2, T8, T11 (photopenic) and the right and left femoral intertrochanteric ridges."  - Pulmonary following--s/p PleurX catheter 3/25, drainage per pulm  - f/u CXR showed rt PTX, s/w pulm, normal sequela post procedure without any need for any acute interventions  - MRI performed--shows diffuse mets including brain  - Heme/Onc following--appreciate recs  - f/u Rad/Onc as outpt 3-4 wks  - Pain regimen per palliative team  - O2 weaned down to 6L NC    # Nondisplaced femoral neck subcapital impaction fracture s/p corrective surgery at [facility]  - Pain control with Tylenol   - CT Pelvis Bony Only No Cont (03.14.21 @ 22:01): Multiple infiltrative bone lesions, largest at the right iliac crest with nondisplaced pathologic fracture and soft tissue mass extension. Findings are compatible with osseous metastasis. Consider nuclear medicine bone scan for the assessment of skeletal metastasis. Nondisplaced right subcapital femoral neck impaction fracture. Consider MRI of the right hip to evaluate extent of disease.  - Ortho following--appreciate recs    #L Gastrocnemius DVT  - Pt had c/o leg swelling on Saturday-->va duplex ordered  - Duplex 3/28 +ve for DVT of the left gastrocnemius vein  - Lovenox increased to therapeutic AC      #Hyponatremia--possibly due to malignancy  - fluid restriction 1 L  - follow up BMP     # Mild hypercalcemia - resolved   - Ca 10.4, albumin 4.4  - Likely secondary to dehydration (patient reports not eating or drinking much because it was difficult for her to get around)    # Gout  - Not on any medication  - Will avoid red meat diet while inpatient    DVT ppx: Therapeutic lovenox  GI ppx: Protonix  Diet: Regular  Ambulatory status: as tolerated,   Code status: DNR/I    PAST MEDICAL & SURGICAL HISTORY:  Diverticulosis    Gout    OA (osteoarthritis)    H/O total hysterectomy

## 2021-03-29 NOTE — PROGRESS NOTE ADULT - TIME BILLING
For clinical care and resident education.
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
## 2021-03-30 DIAGNOSIS — C79.71 SECONDARY MALIGNANT NEOPLASM OF RIGHT ADRENAL GLAND: ICD-10-CM

## 2021-03-30 DIAGNOSIS — E86.0 DEHYDRATION: ICD-10-CM

## 2021-03-30 DIAGNOSIS — C79.51 SECONDARY MALIGNANT NEOPLASM OF BONE: ICD-10-CM

## 2021-03-30 DIAGNOSIS — Z86.010 PERSONAL HISTORY OF COLONIC POLYPS: ICD-10-CM

## 2021-03-30 DIAGNOSIS — K59.00 CONSTIPATION, UNSPECIFIED: ICD-10-CM

## 2021-03-30 DIAGNOSIS — M19.90 UNSPECIFIED OSTEOARTHRITIS, UNSPECIFIED SITE: ICD-10-CM

## 2021-03-30 DIAGNOSIS — R26.2 DIFFICULTY IN WALKING, NOT ELSEWHERE CLASSIFIED: ICD-10-CM

## 2021-03-30 DIAGNOSIS — E87.1 HYPO-OSMOLALITY AND HYPONATREMIA: ICD-10-CM

## 2021-03-30 DIAGNOSIS — E44.0 MODERATE PROTEIN-CALORIE MALNUTRITION: ICD-10-CM

## 2021-03-30 DIAGNOSIS — R53.1 WEAKNESS: ICD-10-CM

## 2021-03-30 DIAGNOSIS — Z90.710 ACQUIRED ABSENCE OF BOTH CERVIX AND UTERUS: ICD-10-CM

## 2021-03-30 DIAGNOSIS — C79.72 SECONDARY MALIGNANT NEOPLASM OF LEFT ADRENAL GLAND: ICD-10-CM

## 2021-03-30 DIAGNOSIS — J91.0 MALIGNANT PLEURAL EFFUSION: ICD-10-CM

## 2021-03-30 DIAGNOSIS — M84.550A PATHOLOGICAL FRACTURE IN NEOPLASTIC DISEASE, PELVIS, INITIAL ENCOUNTER FOR FRACTURE: ICD-10-CM

## 2021-03-30 DIAGNOSIS — E83.52 HYPERCALCEMIA: ICD-10-CM

## 2021-03-30 DIAGNOSIS — E66.9 OBESITY, UNSPECIFIED: ICD-10-CM

## 2021-03-30 DIAGNOSIS — M10.9 GOUT, UNSPECIFIED: ICD-10-CM

## 2021-03-30 DIAGNOSIS — C34.11 MALIGNANT NEOPLASM OF UPPER LOBE, RIGHT BRONCHUS OR LUNG: ICD-10-CM

## 2021-03-30 DIAGNOSIS — R63.0 ANOREXIA: ICD-10-CM
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
## 2021-03-30 DIAGNOSIS — Z87.891 PERSONAL HISTORY OF NICOTINE DEPENDENCE: ICD-10-CM

## 2021-03-30 DIAGNOSIS — M84.58XA PATHOLOGICAL FRACTURE IN NEOPLASTIC DISEASE, OTHER SPECIFIED SITE, INITIAL ENCOUNTER FOR FRACTURE: ICD-10-CM

## 2021-03-30 DIAGNOSIS — M84.512A PATHOLOGICAL FRACTURE IN NEOPLASTIC DISEASE, LEFT SHOULDER, INITIAL ENCOUNTER FOR FRACTURE: ICD-10-CM
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
## 2021-03-30 DIAGNOSIS — M87.351 OTHER SECONDARY OSTEONECROSIS, RIGHT FEMUR: ICD-10-CM
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
## 2021-03-30 DIAGNOSIS — M84.551A PATHOLOGICAL FRACTURE IN NEOPLASTIC DISEASE, RIGHT FEMUR, INITIAL ENCOUNTER FOR FRACTURE: ICD-10-CM

## 2021-03-30 PROCEDURE — 99233 SBSQ HOSP IP/OBS HIGH 50: CPT

## 2021-03-30 RX ADMIN — LIDOCAINE 1 PATCH: 4 CREAM TOPICAL at 10:02

## 2021-03-30 RX ADMIN — POLYETHYLENE GLYCOL 3350 17 GRAM(S): 17 POWDER, FOR SOLUTION ORAL at 11:50

## 2021-03-30 RX ADMIN — MORPHINE SULFATE 4 MILLIGRAM(S): 50 CAPSULE, EXTENDED RELEASE ORAL at 06:12

## 2021-03-30 RX ADMIN — Medication 10 MILLIGRAM(S): at 15:55

## 2021-03-30 RX ADMIN — SENNA PLUS 2 TABLET(S): 8.6 TABLET ORAL at 21:17

## 2021-03-30 RX ADMIN — Medication 650 MILLIGRAM(S): at 10:06

## 2021-03-30 RX ADMIN — MORPHINE SULFATE 4 MILLIGRAM(S): 50 CAPSULE, EXTENDED RELEASE ORAL at 17:13

## 2021-03-30 RX ADMIN — ENOXAPARIN SODIUM 80 MILLIGRAM(S): 100 INJECTION SUBCUTANEOUS at 17:14

## 2021-03-30 RX ADMIN — MORPHINE SULFATE 4 MILLIGRAM(S): 50 CAPSULE, EXTENDED RELEASE ORAL at 17:45

## 2021-03-30 RX ADMIN — LIDOCAINE 1 PATCH: 4 CREAM TOPICAL at 09:15

## 2021-03-30 RX ADMIN — ENOXAPARIN SODIUM 80 MILLIGRAM(S): 100 INJECTION SUBCUTANEOUS at 06:12

## 2021-03-30 RX ADMIN — MORPHINE SULFATE 4 MILLIGRAM(S): 50 CAPSULE, EXTENDED RELEASE ORAL at 12:18

## 2021-03-30 RX ADMIN — Medication 1 TABLET(S): at 11:50

## 2021-03-30 RX ADMIN — LIDOCAINE 1 PATCH: 4 CREAM TOPICAL at 06:12

## 2021-03-30 RX ADMIN — PANTOPRAZOLE SODIUM 40 MILLIGRAM(S): 20 TABLET, DELAYED RELEASE ORAL at 06:12

## 2021-03-30 RX ADMIN — Medication 500 MILLIGRAM(S): at 11:50

## 2021-03-30 RX ADMIN — MORPHINE SULFATE 4 MILLIGRAM(S): 50 CAPSULE, EXTENDED RELEASE ORAL at 11:51

## 2021-03-30 RX ADMIN — Medication 650 MILLIGRAM(S): at 10:30

## 2021-03-30 NOTE — PROGRESS NOTE ADULT - SUBJECTIVE AND OBJECTIVE BOX
DOV HENLEY 70y Female  MRN#: [id number]   Hospital Day: 7d    SUBJECTIVE  Patient is a 70y old Female who presents with a chief complaint of hip arthroplasty, metastatic  lung cancer (30 Mar 2021 12:08)  Currently admitted to medicine with the primary diagnosis of Lung cancer    INTERVAL HPI AND OVERNIGHT EVENTS:  Patient was examined and seen at bedside. This morning she is resting comfortably in bed.    REVIEW OF SYMPTOMS:  +back pain  States breathing improved    OBJECTIVE  PAST MEDICAL & SURGICAL HISTORY  OA (osteoarthritis)    Gout    Diverticulosis    H/O total hysterectomy      ALLERGIES:  moxifloxacin (Anaphylaxis (Moderate))    MEDICATIONS:  STANDING MEDICATIONS  ascorbic acid 500 milliGRAM(s) Oral daily  chlorhexidine 4% Liquid 1 Application(s) Topical <User Schedule>  enoxaparin Injectable 80 milliGRAM(s) SubCutaneous every 12 hours  morphine  - Injectable 4 milliGRAM(s) IV Push every 6 hours  multivitamin 1 Tablet(s) Oral daily  pantoprazole    Tablet 40 milliGRAM(s) Oral before breakfast  polyethylene glycol 3350 17 Gram(s) Oral daily  senna 2 Tablet(s) Oral at bedtime    PRN MEDICATIONS  acetaminophen   Tablet .. 650 milliGRAM(s) Oral every 6 hours PRN  bisacodyl Suppository 10 milliGRAM(s) Rectal daily PRN  lidocaine   Patch 1 Patch Transdermal daily PRN  morphine  - Injectable 4 milliGRAM(s) IV Push every 3 hours PRN      VITAL SIGNS: Last 24 Hours  T(C): 35.7 (30 Mar 2021 07:13), Max: 36.4 (29 Mar 2021 15:00)  T(F): 96.3 (30 Mar 2021 07:13), Max: 97.5 (29 Mar 2021 15:00)  HR: 100 (30 Mar 2021 07:13) (98 - 100)  BP: 117/62 (30 Mar 2021 07:13) (117/62 - 118/68)  BP(mean): --  RR: 18 (30 Mar 2021 07:13) (18 - 18)  SpO2: --    LABS:                        RADIOLOGY:      PHYSICAL EXAM:  CONSTITUTIONAL: No acute distress, ill appearing, well-developed, AAOx3  HEAD: Atraumatic, normocephalic  EYES: EOMI  ENT: Supple  PULMONARY: Markedly diminished R sided breath sounds  CARDIOVASCULAR: Regular rate and rhythm  GASTROINTESTINAL: Soft, non-tender, non-distended  MUSCULOSKELETAL: Chest wall tenderness to palpation, LL swelling BL R>L  NEUROLOGY: non-focal  SKIN: intact    ASSESSMENT & PLAN  70 years old female from home and lives alone with PMHx of OA of bilateral knees, right rotator ruff tear (?), gout, diverticulosis, presented with difficulty ambulation due to worsening pain on hips and shoulders. Pt found to have femoral neck fracture, transferred to [facility] for surgery and since transferred back to [facility] for further oncological workup and PleurX catheter placement    # Right Perihilar Mass w/ large left pleural effusion  # Pulmonary adenoCA with metastasis   - CT Chest No Cont (03.14.21 @ 18:33): Poorly defined malignant right upper lobe mass extending into the right hilum and mediastinum with occlusion of the right upper lobe airways. Metastatic osteolytic right scapular lesion. Acute mildly displaced left glenoid fracture and age-indeterminate T11 compression deformity, underlying pathologic fractures are not excluded. Left greater than right bilateral adrenal nodularity, likely metastatic.  - s/p thoracentesis 3/15 (drained 1L): +ve for malignant cells (adenocarcinoma)   - NM Bone scan showed "Multiple bony abnormalities which by pattern of distribution and correlation with CT findings are consistent with metastatic bone disease. Metastatic sites  include right and left ilium, right sacroiliac joint, L2, T8, T11 (photopenic) and the right and left femoral intertrochanteric ridges."  - Pulmonary following--s/p PleurX catheter 3/25, drainage per pulm-->pleural fluid path results +ve for adenoCA but also candida fungi present-->discussed w/ pulm, they will review report  - MRI performed--shows diffuse mets including brain  - Heme/Onc following--appreciate recs--s/w heme/onc today, still awaiting results of tumor markers necessary for targeted treatment options  - f/u Rad/Onc as outpt 3-4 wks  - Pain regimen per palliative team  - Pt stable on 6L NC  - f/u CXR in am    # Nondisplaced femoral neck subcapital impaction fracture s/p corrective surgery at [facility]  - Pain control with Tylenol   - CT Pelvis Bony Only No Cont (03.14.21 @ 22:01): Multiple infiltrative bone lesions, largest at the right iliac crest with nondisplaced pathologic fracture and soft tissue mass extension. Findings are compatible with osseous metastasis. Consider nuclear medicine bone scan for the assessment of skeletal metastasis. Nondisplaced right subcapital femoral neck impaction fracture. Consider MRI of the right hip to evaluate extent of disease.  - Ortho following--appreciate recs    #L Gastrocnemius DVT  - Pt had c/o leg swelling on Saturday-->va duplex ordered  - Duplex 3/28 +ve for DVT of the left gastrocnemius vein  - c/w therapeutic lovenox      #Hyponatremia--possibly due to malignancy  - fluid restriction 1 L  - follow up BMP     # Mild hypercalcemia - resolved   - Ca 10.4, albumin 4.4  - Likely secondary to dehydration (patient reports not eating or drinking much because it was difficult for her to get around)    # Gout  - Not on any medication  - Will avoid red meat diet while inpatient    DVT ppx: Therapeutic lovenox  GI ppx: Protonix  Diet: Regular  Ambulatory status: as tolerated,   Code status: DNR/I    PAST MEDICAL & SURGICAL HISTORY:  OA (osteoarthritis)    Gout    Diverticulosis    H/O total hysterectomy

## 2021-03-30 NOTE — DIETITIAN INITIAL EVALUATION ADULT. - REASON
Pt c/o back pain. Unable to perform Nutrition focused physical exam. Performed on previous initial RD assessment, noted moderate muscle wasting of temporals.

## 2021-03-30 NOTE — DIETITIAN INITIAL EVALUATION ADULT. - OTHER CALCULATIONS
wt used: 52kg IBW --?accuracy of CBW kcal: 1560-1820kcal (30-35kcal/kg IBW) -- ?accuracy of CBW, CA considered protein: 62-73g (1.2-1.4g/kg IBW) -- as mentioned fluids: 1ml/kcal or per LIP

## 2021-03-30 NOTE — DIETITIAN INITIAL EVALUATION ADULT. - ADD RECOMMEND
Onset: last night    Location / description: Pt reports no appetite, concerned with constipation, c/o back and abd. Pain which started last night, last BM 2 days ago    Precipitating Factors: as above    Pain Scale (1-10), 10 highest: 8/10    Associated Symptoms: as above    What improves/worsens symptoms: Nothing    Symptom specific medications: see med list    LMP : No LMP recorded. Patient is postmenopausal.  Are you pregnant or breast feeding: NA    Recent Care: Dc'd from in. Yesterday    Did the patient have a positive coronavirus screening?: No  Date of Covid-19 exposure:  NA    PLAN:  Directed to Emergency Department    Patient/Caller agrees to follow recommendations.    Reason for Disposition  • [1] SEVERE pain (e.g., excruciating) AND [2] present > 1 hour    Protocols used: ABDOMINAL PAIN - FEMALE-A-       Liberalize diet as pt with poor po intake. Change diet to regular. Order ensure pudding BID and prosource gelatin plus BID. Pt continues to c/o constipation, dulcolax suppository ordered. Obtain new wt

## 2021-03-30 NOTE — PROGRESS NOTE ADULT - ASSESSMENT
70yFemale being evaluated for goals of care and symptom management. Pt on IV morphine for pain standing and PRN. Says she doesn't get the IV morphine PRN. Nurse gave her Tylenol. Will change PRN order to be given and not tylenol. Pt OOB to chair but still very winded even at rest. On nasal cannula oxygen.    Pt reports being constipated. On bowel regimen. Ordered Dulcolax supp PRN, not given. Spoke to resident and discussed patient. She has difficulty asking and or telling nurse what she needs. MD will advise nurse give the supp now. Also discussed stopping Tyelnol PRN for pain, just have Morphine available as PRN. Palliative care will continue to titrate medications.       MEDD (morphine equivalent daily dose): 60mg/24hrs       See Recs below.    - DNR/DNI/HCP  - Morphine 4mg IV q 6 hrs   - Morphine 4mg IV q 4 hrs PRN pain 1-10 mild/mod/severe  - Bowel regimen, added dulcolax supp, give x 1 today  - PT working with pt for plan for STR?        Problem/Recommendation - 1:  ·  Problem: Bone metastases.  Recommendation: -Diffuse bone mets causing pain  -increase morphine to 4mg IV Q3h PRN  -Add morphine 4 mg IV Q6h ATC.   -  Convert to oral before d/c - spoke to attending MD she said pt still not ready for d/c      Problem/Recommendation - 2:  ·  Problem: Chronic midline low back pain without sciatica.  Recommendation: - due to bone mets  -Increase morphine to 4 mg IV Q4h PRN  -Add morphine 4 mg Iv Q6h ATC. Pt encouraged to use PRNs, will convert to PO medications before d/c     Problem/Recommendation - 3:  ·  Problem: Palliative care by specialist.  Recommendation: Patient seen for pain control. Pt is DNR/DNI, very poor prognosis. Spoke to heme/onc and primary team requested to update pt and family on current prognosis and options. Palliative care available for family meeting.   Recs as above.     Problem/Recommendation - 4:  ·	Problem: Constipation - Senna, mirlax, add Dulcolax supp Q D PRN. Monitor BMs Palliative and primary team following give Dulcolax supp today please        Please call [phone] with questions or concerns 24/7.   We will continue to follow.

## 2021-03-30 NOTE — DIETITIAN INITIAL EVALUATION ADULT. - OTHER INFO
69 yo F with PMH gout, diverticulosis, presented with difficulty ambulation due to worsening pain on hips and shoulders. Pt found to have femoral neck fracture, transferred to [facility] for surgery and since transferred back to [facility] for further oncological workup and PleurX catheter placement    Pt known to me from most recent hospital adm. Per initial RD note (3/18/2021) "Pt reports poor appetite and po intake x 2-3 months as had difficulty ambulating to cook herself meals. Lives home alone. Denies use of oral nutrition supplements, vitamins, or minerals. Confirms NKFA. Has lactose intolerance. No Adventism or cultural food preferences. Denies difficulties chewing or swallowing."    In house pt reports poor po intake. C/o back pain at time of RD visit. No c/o N+V, diarrhea. C/o constipation. Does not remember last BM. No BM documented in EMR.     UBW 185lbs. Previous adm wt 160lbs (3/18). ?accuracy of CBW as pt with poor po intake PTA to previous adm and over the course of the past 2 weeks. unable to weigh pt at time of assessment as OOB in chair.     Ht:  63" (per pt)   Wt:  189.5lbs   IBW:  115lbs +/-10%    BMI: 33.5  kg/m2      Skin: No pressure injuries per RN flow sheets  Edema: +1 R+L leg, +2 R arm edema noted per RN flow sheets

## 2021-03-31 DIAGNOSIS — K59.03 DRUG INDUCED CONSTIPATION: ICD-10-CM

## 2021-03-31 LAB
ALBUMIN SERPL ELPH-MCNC: 3 G/DL — LOW (ref 3.5–5.2)
ALP SERPL-CCNC: 203 U/L — HIGH (ref 30–115)
ALT FLD-CCNC: 15 U/L — SIGNIFICANT CHANGE UP (ref 0–41)
ANION GAP SERPL CALC-SCNC: 13 MMOL/L — SIGNIFICANT CHANGE UP (ref 7–14)
AST SERPL-CCNC: 32 U/L — SIGNIFICANT CHANGE UP (ref 0–41)
BILIRUB SERPL-MCNC: 2.4 MG/DL — HIGH (ref 0.2–1.2)
BUN SERPL-MCNC: 19 MG/DL — SIGNIFICANT CHANGE UP (ref 10–20)
CALCIUM SERPL-MCNC: 10.2 MG/DL — HIGH (ref 8.5–10.1)
CHLORIDE SERPL-SCNC: 89 MMOL/L — LOW (ref 98–110)
CO2 SERPL-SCNC: 30 MMOL/L — SIGNIFICANT CHANGE UP (ref 17–32)
CREAT SERPL-MCNC: 0.6 MG/DL — LOW (ref 0.7–1.5)
CULTURE RESULTS: SIGNIFICANT CHANGE UP
GLUCOSE SERPL-MCNC: 119 MG/DL — HIGH (ref 70–99)
HCT VFR BLD CALC: 26.9 % — LOW (ref 37–47)
HGB BLD-MCNC: 8.7 G/DL — LOW (ref 12–16)
MCHC RBC-ENTMCNC: 32.3 G/DL — SIGNIFICANT CHANGE UP (ref 32–37)
MCHC RBC-ENTMCNC: 32.5 PG — HIGH (ref 27–31)
MCV RBC AUTO: 100.4 FL — HIGH (ref 81–99)
NRBC # BLD: 0 /100 WBCS — SIGNIFICANT CHANGE UP (ref 0–0)
PLATELET # BLD AUTO: 242 K/UL — SIGNIFICANT CHANGE UP (ref 130–400)
POTASSIUM SERPL-MCNC: 4.7 MMOL/L — SIGNIFICANT CHANGE UP (ref 3.5–5)
POTASSIUM SERPL-SCNC: 4.7 MMOL/L — SIGNIFICANT CHANGE UP (ref 3.5–5)
PROT SERPL-MCNC: 6.1 G/DL — SIGNIFICANT CHANGE UP (ref 6–8)
RBC # BLD: 2.68 M/UL — LOW (ref 4.2–5.4)
RBC # FLD: 20.3 % — HIGH (ref 11.5–14.5)
SODIUM SERPL-SCNC: 132 MMOL/L — LOW (ref 135–146)
SPECIMEN SOURCE: SIGNIFICANT CHANGE UP
WBC # BLD: 18.2 K/UL — HIGH (ref 4.8–10.8)
WBC # FLD AUTO: 18.2 K/UL — HIGH (ref 4.8–10.8)

## 2021-03-31 PROCEDURE — 71045 X-RAY EXAM CHEST 1 VIEW: CPT | Mod: 26

## 2021-03-31 PROCEDURE — 99233 SBSQ HOSP IP/OBS HIGH 50: CPT

## 2021-03-31 RX ORDER — DRONABINOL 2.5 MG
5 CAPSULE ORAL THREE TIMES A DAY
Refills: 0 | Status: DISCONTINUED | OUTPATIENT
Start: 2021-03-31 | End: 2021-04-05

## 2021-03-31 RX ORDER — MORPHINE SULFATE 50 MG/1
7.5 CAPSULE, EXTENDED RELEASE ORAL EVERY 4 HOURS
Refills: 0 | Status: DISCONTINUED | OUTPATIENT
Start: 2021-03-31 | End: 2021-04-05

## 2021-03-31 RX ORDER — MORPHINE SULFATE 50 MG/1
15 CAPSULE, EXTENDED RELEASE ORAL EVERY 12 HOURS
Refills: 0 | Status: DISCONTINUED | OUTPATIENT
Start: 2021-03-31 | End: 2021-04-05

## 2021-03-31 RX ADMIN — LIDOCAINE 1 PATCH: 4 CREAM TOPICAL at 20:31

## 2021-03-31 RX ADMIN — CHLORHEXIDINE GLUCONATE 1 APPLICATION(S): 213 SOLUTION TOPICAL at 05:04

## 2021-03-31 RX ADMIN — MORPHINE SULFATE 4 MILLIGRAM(S): 50 CAPSULE, EXTENDED RELEASE ORAL at 00:01

## 2021-03-31 RX ADMIN — MORPHINE SULFATE 4 MILLIGRAM(S): 50 CAPSULE, EXTENDED RELEASE ORAL at 11:23

## 2021-03-31 RX ADMIN — MORPHINE SULFATE 15 MILLIGRAM(S): 50 CAPSULE, EXTENDED RELEASE ORAL at 18:00

## 2021-03-31 RX ADMIN — ENOXAPARIN SODIUM 80 MILLIGRAM(S): 100 INJECTION SUBCUTANEOUS at 05:03

## 2021-03-31 RX ADMIN — Medication 500 MILLIGRAM(S): at 11:23

## 2021-03-31 RX ADMIN — Medication 1 TABLET(S): at 11:23

## 2021-03-31 RX ADMIN — PANTOPRAZOLE SODIUM 40 MILLIGRAM(S): 20 TABLET, DELAYED RELEASE ORAL at 05:04

## 2021-03-31 RX ADMIN — LIDOCAINE 1 PATCH: 4 CREAM TOPICAL at 18:00

## 2021-03-31 RX ADMIN — MORPHINE SULFATE 4 MILLIGRAM(S): 50 CAPSULE, EXTENDED RELEASE ORAL at 05:04

## 2021-03-31 RX ADMIN — SENNA PLUS 2 TABLET(S): 8.6 TABLET ORAL at 21:30

## 2021-03-31 RX ADMIN — LIDOCAINE 1 PATCH: 4 CREAM TOPICAL at 06:29

## 2021-03-31 RX ADMIN — MORPHINE SULFATE 4 MILLIGRAM(S): 50 CAPSULE, EXTENDED RELEASE ORAL at 11:53

## 2021-03-31 RX ADMIN — ENOXAPARIN SODIUM 80 MILLIGRAM(S): 100 INJECTION SUBCUTANEOUS at 18:00

## 2021-03-31 NOTE — PROGRESS NOTE ADULT - SUBJECTIVE AND OBJECTIVE BOX
DOV HENLEY 70y Female  MRN#: [id number]   Hospital Day: 8d    SUBJECTIVE  Patient is a 70y old Female who presents with a chief complaint of hip pain, (31 Mar 2021 12:58)  Currently admitted to medicine with the primary diagnosis of     INTERVAL HPI AND OVERNIGHT EVENTS:  Patient was examined and seen at bedside. This morning she is resting comfortably in bed. Endorses having BM o/n    REVIEW OF SYMPTOMS:  +heel pain, heel pain, decreased appetite  Denies any CP, n/v/d/c    OBJECTIVE  PAST MEDICAL & SURGICAL HISTORY  OA (osteoarthritis)    Gout    Diverticulosis    H/O total hysterectomy      ALLERGIES:  moxifloxacin (Anaphylaxis (Moderate))    MEDICATIONS:  STANDING MEDICATIONS  ascorbic acid 500 milliGRAM(s) Oral daily  chlorhexidine 4% Liquid 1 Application(s) Topical <User Schedule>  enoxaparin Injectable 80 milliGRAM(s) SubCutaneous every 12 hours  morphine ER Tablet 15 milliGRAM(s) Oral every 12 hours  multivitamin 1 Tablet(s) Oral daily  pantoprazole    Tablet 40 milliGRAM(s) Oral before breakfast  polyethylene glycol 3350 17 Gram(s) Oral daily  senna 2 Tablet(s) Oral at bedtime    PRN MEDICATIONS  bisacodyl Suppository 10 milliGRAM(s) Rectal daily PRN  lidocaine   Patch 1 Patch Transdermal daily PRN  morphine  IR 7.5 milliGRAM(s) Oral every 4 hours PRN      VITAL SIGNS: Last 24 Hours  T(C): 36.5 (31 Mar 2021 10:48), Max: 37 (30 Mar 2021 23:47)  T(F): 97.7 (31 Mar 2021 10:48), Max: 98.6 (30 Mar 2021 23:47)  HR: 97 (31 Mar 2021 10:48) (92 - 97)  BP: 117/67 (31 Mar 2021 10:48) (93/63 - 117/67)  BP(mean): --  RR: 18 (31 Mar 2021 10:48) (18 - 18)  SpO2: --    LABS:                        8.7    18.20 )-----------( 242      ( 31 Mar 2021 08:45 )             26.9     03-31    132<L>  |  89<L>  |  19  ----------------------------<  119<H>  4.7   |  30  |  0.6<L>    Ca    10.2<H>      31 Mar 2021 08:45    TPro  6.1  /  Alb  3.0<L>  /  TBili  2.4<H>  /  DBili  x   /  AST  32  /  ALT  15  /  AlkPhos  203<H>  03-31                  RADIOLOGY:  < from: Xray Chest 1 View- PORTABLE-Routine (Xray Chest 1 View- PORTABLE-Routine in AM.) (03.31.21 @ 06:03) >  Comparison : Chest radiograph March 26, 2021.    Technique/Positioning: Frontal portable, low lung volumes.    Findings:    Support devices: Right pleural catheter.    Cardiac/mediastinum/hilum: Right perihilar abnormality    Lung parenchyma/Pleura: Unchanged    Skeleton/soft tissues: Unchanged    Impression:    Right perihilar abnormality. Support devices as described.    Pneumothorax seen on the prior study no longer recognized.    < end of copied text >      PHYSICAL EXAM:  CONSTITUTIONAL: No acute distress, ill appearing, well-developed, AAOx3  HEAD: Atraumatic, normocephalic  EYES: EOMI  ENT: Supple  PULMONARY: Markedly diminished R sided breath sounds  CARDIOVASCULAR: Regular rate and rhythm  GASTROINTESTINAL: Soft, non-tender, non-distended  MUSCULOSKELETAL: Chest wall tenderness to palpation, LL swelling BL R>L  NEUROLOGY: non-focal  SKIN: intact    ASSESSMENT & PLAN  70 years old female from home and lives alone with PMHx of OA of bilateral knees, right rotator ruff tear (?), gout, diverticulosis, presented with difficulty ambulation due to worsening pain on hips and shoulders. Pt found to have femoral neck fracture, transferred to [facility] for surgery and since transferred back to [facility] for further oncological workup and PleurX catheter placement    # Right Perihilar Mass w/ large left pleural effusion  # Pulmonary adenoCA with metastasis   - CT Chest No Cont (03.14.21 @ 18:33): Poorly defined malignant right upper lobe mass extending into the right hilum and mediastinum with occlusion of the right upper lobe airways. Metastatic osteolytic right scapular lesion. Acute mildly displaced left glenoid fracture and age-indeterminate T11 compression deformity, underlying pathologic fractures are not excluded. Left greater than right bilateral adrenal nodularity, likely metastatic.  - s/p thoracentesis 3/15 (drained 1L): +ve for malignant cells (adenocarcinoma)   - NM Bone scan "consistent with metastatic bone disease. Metastatic sites  include right and left ilium, right sacroiliac joint, L2, T8, T11 (photopenic) and the right and left femoral intertrochanteric ridges."  - Pulmonary following--s/p PleurX catheter 3/25, drainage per pulm-->pleural fluid path results +ve for adenoCA but also candida fungi present-->discussed w/ pulm, they will review report  - MRI performed--shows diffuse mets including brain  - Heme/Onc following--appreciate recs--heme/onc still awaiting results of tumor markers necessary for targeted treatment options  - f/u Rad/Onc as outpt 3-4 wks  - Pain regimen per palliative team  - Pt stable on 6L NC    # Nondisplaced femoral neck subcapital impaction fracture s/p corrective surgery at [facility]  - Pain control with Tylenol   - CT Pelvis Bony Only No Cont (03.14.21 @ 22:01): Multiple infiltrative bone lesions, largest at the right iliac crest with nondisplaced pathologic fracture and soft tissue mass extension. Findings are compatible with osseous metastasis. Consider nuclear medicine bone scan for the assessment of skeletal metastasis. Nondisplaced right subcapital femoral neck impaction fracture. Consider MRI of the right hip to evaluate extent of disease.  - Ortho following--appreciate recs    #L Gastrocnemius DVT  - Pt had c/o leg swelling on Saturday-->va duplex ordered  - Duplex 3/28 +ve for DVT of the left gastrocnemius vein  - c/w therapeutic lovenox    #Hyponatremia--possibly due to malignancy  - fluid restriction 1 L  - follow up BMP     # Mild hypercalcemia - resolved   - Ca 10.4, albumin 4.4  - Likely secondary to dehydration (patient reports not eating or drinking much because it was difficult for her to get around)    # Gout  - Not on any medication  - Will avoid red meat diet while inpatient    DVT ppx: Therapeutic lovenox  GI ppx: Protonix  Diet: Regular  Ambulatory status: as tolerated,   Code status: DNR/I        PAST MEDICAL & SURGICAL HISTORY:  OA (osteoarthritis)    Gout    Diverticulosis    H/O total hysterectomy

## 2021-03-31 NOTE — PROGRESS NOTE ADULT - ASSESSMENT
70 years old female from home and lives alone with PMHx of OA of bilateral knees, right rotator ruff tear (?), gout, diverticulosis, presented with difficulty ambulation, found to have metastatic adenocarcinoma and right hip fracture, sent to  for orthopedic oncology s/p hemiarthroplasty.    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 Metastatic osteolytic right scapular lesion. Acute mildly displaced left glenoid fracture and age-indeterminate T11 compression deformity. Left greater than right bilateral adrenal nodularity, likely metastatic.  - Bone scan : Metastatic sites  include right and left ilium, right sacroiliac joint, L2, T8, T11 (photopenic) and the right and left femoral intertrochanteric ridges.Increased uptake in the right 5, 6, 7, 8 anterior ribs suggesting rib fractures and in the left sixth anterior rib most likely representing rib fracture  - MRI brain showed parenchymal lesion as well as likely leptomeningeal disease  - MR spine showed diffuse mets including T2, T6, T7, T11, T12, L2, L5 with likely leptomeningeal involvement  - She is not a candidate for chemotherapy nor does the patient want it  - She is not a candidate for radiation due to the extensive leptomeningeal involvement  - Her TPS score was 0, so she is not a candidate for immunotherapy alone  - The patient and her family are still awaiting the results of NGS to see if she has any actionable mutations, if these are negative then we will recommend hospice. If she does have a mutation we can offer treatment. In the interim, recommend aggressive pain management and supportive care  - Started on morphine Er 15mg BID + IR PRN  - Start marinol 5mg TID  - Can consider steroids for cerebral edema if any neurologic signs are present    # Gastrox thrombus: Distal thrombus however with cancer and immobility it is reasonable to treat  - On therapeutic lovenod    # Non-displaced fracture of Rt Femoral neck s/p right hemiarthroplasty at [facility]    # Recurrent malignant pleural effusion  - s/p Thora 3/15 : 1L fluid removed; cytology positive for adenocarcinoma  - s/p PleurX 3/25; intermediate drainange                   70 years old female from home and lives alone with PMHx of OA of bilateral knees, right rotator ruff tear (?), gout, diverticulosis, presented with difficulty ambulation, found to have metastatic adenocarcinoma and right hip fracture, sent to  for orthopedic oncology s/p hemiarthroplasty.    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 Metastatic osteolytic right scapular lesion. Acute mildly displaced left glenoid fracture and age-indeterminate T11 compression deformity. Left greater than right bilateral adrenal nodularity, likely metastatic.  - Bone scan : Metastatic sites  include right and left ilium, right sacroiliac joint, L2, T8, T11 (photopenic) and the right and left femoral intertrochanteric ridges.Increased uptake in the right 5, 6, 7, 8 anterior ribs suggesting rib fractures and in the left sixth anterior rib most likely representing rib fracture  - MRI brain showed parenchymal lesion as well as likely leptomeningeal disease  - MR spine showed diffuse mets including T2, T6, T7, T11, T12, L2, L5 with likely leptomeningeal involvement  - She is not a candidate for chemotherapy nor does the patient want it  - She is not a candidate for radiation due to the extensive leptomeningeal involvement  - Her TPS score was 0, so she is not a candidate for immunotherapy alone  - The patient and her family are still awaiting the results of NGS to see if she has any actionable mutations, if these are negative then we will recommend hospice. If she does have a mutation we can offer treatment. In the interim, recommend aggressive pain management and supportive care  - Started on morphine Er 15mg BID + IR PRN  - Start marinol 5mg BID  - Can consider steroids for cerebral edema if any neurologic signs are present    # Gastrox thrombus: Distal thrombus however with cancer and immobility it is reasonable to treat  - On therapeutic lovenod    # Non-displaced fracture of Rt Femoral neck s/p right hemiarthroplasty at [facility]    # Recurrent malignant pleural effusion  - s/p Thora 3/15 : 1L fluid removed; cytology positive for adenocarcinoma  - s/p PleurX 3/25; intermediate drainange

## 2021-03-31 NOTE — PROGRESS NOTE ADULT - ASSESSMENT
70yFemale being evaluated for goals of care and symptom management. Patient states pain better controlled with ATC medication.    MEDD (morphine equivalent daily dose): 48 mg/24hrs       See Recs below.    Please call [phone] with questions or concerns 24/7.   We will continue to follow.     Discussed with Rn and resident

## 2021-03-31 NOTE — PROGRESS NOTE ADULT - SUBJECTIVE AND OBJECTIVE BOX
24H events:    We discussed with the patient and her sister's today that her TPS score came back as 0, which means she is not a candidate for single agent immunotherapy. Additionally, she has been seen by radiation oncology and her disease is to extensive for radiation to be of any benefit. The patient does not want chemotherapy and in her current state she would not be able to tolerate it anyway. The patient and her sister's still would like to wait for the NGS/mutations to come back to see if she is a candidate for targeted therapy.    Patient complaining of poor appetite, requesting appetite stimulant    She still has some back pain, she is being switched to PO extended release from IV today.    PAST MEDICAL & SURGICAL HISTORY  OA (osteoarthritis)    Gout    Diverticulosis    H/O total hysterectomy      SOCIAL HISTORY:  Negative for smoking/alcohol/drug use.     ALLERGIES:  moxifloxacin (Anaphylaxis (Moderate))    MEDICATIONS:  STANDING MEDICATIONS  ascorbic acid 500 milliGRAM(s) Oral daily  chlorhexidine 4% Liquid 1 Application(s) Topical <User Schedule>  enoxaparin Injectable 80 milliGRAM(s) SubCutaneous every 12 hours  morphine ER Tablet 15 milliGRAM(s) Oral every 12 hours  multivitamin 1 Tablet(s) Oral daily  pantoprazole    Tablet 40 milliGRAM(s) Oral before breakfast  polyethylene glycol 3350 17 Gram(s) Oral daily  senna 2 Tablet(s) Oral at bedtime    PRN MEDICATIONS  bisacodyl Suppository 10 milliGRAM(s) Rectal daily PRN  lidocaine   Patch 1 Patch Transdermal daily PRN  morphine  IR 7.5 milliGRAM(s) Oral every 4 hours PRN    VITALS:   T(F): 97.7  HR: 97  BP: 117/67  RR: 18  SpO2: --    LABS:                        8.7    18.20 )-----------( 242      ( 31 Mar 2021 08:45 )             26.9     03-31    132<L>  |  89<L>  |  19  ----------------------------<  119<H>  4.7   |  30  |  0.6<L>    Ca    10.2<H>      31 Mar 2021 08:45    TPro  6.1  /  Alb  3.0<L>  /  TBili  2.4<H>  /  DBili  x   /  AST  32  /  ALT  15  /  AlkPhos  203<H>  03-31                  RADIOLOGY:  < from: [facility] Duplex Lower Ext Vein Scan, Bilat (03.28.21 @ 23:24) >    Impression:    No evidence of deep venous thrombosis or superficial thrombophlebitis in the right lower extremity.  DVT of the left gastrocnemius vein    < end of copied text >    < from: Xray Chest 1 View- PORTABLE-Routine (Xray Chest 1 View- PORTABLE-Routine in AM.) (03.31.21 @ 06:03) >    Impression:    Right perihilar abnormality. Support devices as described.    Pneumothorax seen on the prior study no longer recognized.    < end of copied text >      PHYSICAL EXAM:  GEN: No acute distress  HENT: NCAT, EOMI  LYMPH: No appreciable adenopathy  LUNGS: Using nasal cannula, mild tachypnea    HEART: regular rate   ABD: Soft, non-tender, non-distended  SKIN: No rash  NEURO: She is moving all extremities, she is able to engage in conversation and answer questions appropriately, but appears slightly confused

## 2021-03-31 NOTE — PROGRESS NOTE ADULT - SUBJECTIVE AND OBJECTIVE BOX
DOV HENLEY             MRN-[id number]    CC: right hip/back pain     HPI: increasing back/hip pain with debility. Admitted 3/13/21, s/p right hip arthropasty at [facility] and (+) metastatic lung cancer new dx      SUBJECTIVE: Patient seen and examined at bedside. She was sleeping but woke up to voice.   Patient states pain is better controlled with ATC medication but it wears off. Not using PRN meds.   Denies nausea, vomiting, constipation but appears very fatigued and weak.     ROS:  DYSPNEA: Yes	  NAUS/VOM: No 	  SECRETIONS: No 	  AGITATION: No   Pain (Y/N): Yes back and right hip   -Provocation/Palliation: weight bearing it worsens   -Quality/Quantity: sharp/ throbbing  -Radiating: right hip to back   -Severity: 6/10   -Timing/Frequency: daily constant   -Impact on ADLs: pt has decreased ability to walk or stand     OTHER REVIEW OF SYSTEMS: fatigue and weakness     PEx:  70y  General: AAOx3 , lying in bed  , fatigued and weak   HEENT:  NCAT PERRL EOMI Non icteric MOM  Neck: Supple no masses  CVS: RR S1S2 edema general   Resp: Unlabored (+) tachypneic . decreased BS on right   GI:  Soft NT ND BS+  :   [place]  Musc: No C/C/E    Neuro: Follows commands No focal deficits  Psych: unhappy affect   Skin: Non jaundiced   Lymph: Normal                     Last BM: still no BM documented       ALLERGIES: Moxifloxin      Intolerances dairy, lactose, and shellfish     OPIATE NAÏVE (Y/N): no     MEDICATIONS: REVIEWED  MEDICATIONS  (STANDING):  ascorbic acid 500 milliGRAM(s) Oral daily  chlorhexidine 4% Liquid 1 Application(s) Topical <User Schedule>  enoxaparin Injectable 80 milliGRAM(s) SubCutaneous every 12 hours  morphine  - Injectable 4 milliGRAM(s) IV Push every 6 hours  multivitamin 1 Tablet(s) Oral daily  pantoprazole    Tablet 40 milliGRAM(s) Oral before breakfast  polyethylene glycol 3350 17 Gram(s) Oral daily  senna 2 Tablet(s) Oral at bedtime    MEDICATIONS  (PRN):  bisacodyl Suppository 10 milliGRAM(s) Rectal daily PRN Constipation  lidocaine   Patch 1 Patch Transdermal daily PRN Back pain  morphine  - Injectable 4 milliGRAM(s) IV Push every 3 hours PRN mild to severe pain      LABS: REVIEWED  CBC:                        8.7    18.20 )-----------( 242      ( 31 Mar 2021 08:45 )             26.9     CMP:    03-31    132<L>  |  89<L>  |  19  ----------------------------<  119<H>  4.7   |  30  |  0.6<L>    Ca    10.2<H>      31 Mar 2021 08:45    TPro  6.1  /  Alb  3.0<L>  /  TBili  2.4<H>  /  DBili  x   /  AST  32  /  ALT  15  /  AlkPhos  203<H>  03-31  Albumin, Serum: 3.0 g/dL (03-31-21 @ 08:45)      IMAGING: REVIEWED    MRI T and L spine 3/25: imited study due to motion artifacts.    Diffuse osseous metastasis are noted throughout the thoracic and lumbar spines, worse at T2, T6, T7, T11, T12, L2, and L5 vertebral bodies as well as the sacroiliac bones with heterogeneous enhancements. There is mild retropulsion involving the compression fractures of the T6 and T7, and moderate retropulsion at T11 secondary to posteriorly encroaching soft tissue component.    Mild diffuse circumferential thickening/enhancement of the epidural soft tissues, most prominent at the level of T5-6 and T10-L1, likely representing metastasis.    mild scattered discontinuous enhancements along the nerve roots suspicious for leptomeningeal carcinomatosis.    Moderate diffuse edema/enhancement involving the paraspinal muscles likely representing nonspecific myositis.        EKG reviewed     ADVANCED DIRECTIVES:       DNR/DNI  HCP     DECISION MAKER: Patient   LEGAL SURROGATE: sistermariah Vizcarra and Yaritza     PSYCHOSOCIAL-SPIRITUAL ASSESSMENT:       Reviewed       Care plan unchanged       Care plan adjusted as above	    GOALS OF CARE DISCUSSION       Palliative care info/counseling provided	           Family meeting       Advanced Directives addressed please see Advance Care Planning Note	           See previous Palliative Medicine Note       Documentation of GOC:    CURRENT DISPO PLAN:     SNF for rehab when medically stable     REFERRALS	        Palliative Med        Unit SW/Case Mgmt              Speech/Swallow       Patient/Family Support      Hospice       Nutrition       Dietician       PT/OT

## 2021-04-01 LAB
ALBUMIN SERPL ELPH-MCNC: 2.9 G/DL — LOW (ref 3.5–5.2)
ALP SERPL-CCNC: 185 U/L — HIGH (ref 30–115)
ALT FLD-CCNC: 16 U/L — SIGNIFICANT CHANGE UP (ref 0–41)
ANION GAP SERPL CALC-SCNC: 14 MMOL/L — SIGNIFICANT CHANGE UP (ref 7–14)
ANISOCYTOSIS BLD QL: SLIGHT — SIGNIFICANT CHANGE UP
AST SERPL-CCNC: 33 U/L — SIGNIFICANT CHANGE UP (ref 0–41)
BASOPHILS # BLD AUTO: 0 K/UL — SIGNIFICANT CHANGE UP (ref 0–0.2)
BASOPHILS NFR BLD AUTO: 0 % — SIGNIFICANT CHANGE UP (ref 0–1)
BILIRUB SERPL-MCNC: 2.1 MG/DL — HIGH (ref 0.2–1.2)
BUN SERPL-MCNC: 20 MG/DL — SIGNIFICANT CHANGE UP (ref 10–20)
CALCIUM SERPL-MCNC: 10.1 MG/DL — SIGNIFICANT CHANGE UP (ref 8.5–10.1)
CHLORIDE SERPL-SCNC: 90 MMOL/L — LOW (ref 98–110)
CO2 SERPL-SCNC: 27 MMOL/L — SIGNIFICANT CHANGE UP (ref 17–32)
CREAT SERPL-MCNC: 0.5 MG/DL — LOW (ref 0.7–1.5)
EOSINOPHIL # BLD AUTO: 0 K/UL — SIGNIFICANT CHANGE UP (ref 0–0.7)
EOSINOPHIL NFR BLD AUTO: 0 % — SIGNIFICANT CHANGE UP (ref 0–8)
GIANT PLATELETS BLD QL SMEAR: PRESENT — SIGNIFICANT CHANGE UP
GLUCOSE SERPL-MCNC: 99 MG/DL — SIGNIFICANT CHANGE UP (ref 70–99)
HCT VFR BLD CALC: 26.8 % — LOW (ref 37–47)
HGB BLD-MCNC: 8.6 G/DL — LOW (ref 12–16)
LYMPHOCYTES # BLD AUTO: 0.87 K/UL — LOW (ref 1.2–3.4)
LYMPHOCYTES # BLD AUTO: 4.4 % — LOW (ref 20.5–51.1)
MACROCYTES BLD QL: SLIGHT — SIGNIFICANT CHANGE UP
MAGNESIUM SERPL-MCNC: 2.1 MG/DL — SIGNIFICANT CHANGE UP (ref 1.8–2.4)
MANUAL SMEAR VERIFICATION: SIGNIFICANT CHANGE UP
MCHC RBC-ENTMCNC: 32.1 G/DL — SIGNIFICANT CHANGE UP (ref 32–37)
MCHC RBC-ENTMCNC: 32.5 PG — HIGH (ref 27–31)
MCV RBC AUTO: 101.1 FL — HIGH (ref 81–99)
MICROCYTES BLD QL: SLIGHT — SIGNIFICANT CHANGE UP
MONOCYTES # BLD AUTO: 0.52 K/UL — SIGNIFICANT CHANGE UP (ref 0.1–0.6)
MONOCYTES NFR BLD AUTO: 2.6 % — SIGNIFICANT CHANGE UP (ref 1.7–9.3)
NEUTROPHILS # BLD AUTO: 18.42 K/UL — HIGH (ref 1.4–6.5)
NEUTROPHILS NFR BLD AUTO: 93 % — HIGH (ref 42.2–75.2)
OVALOCYTES BLD QL SMEAR: SLIGHT — SIGNIFICANT CHANGE UP
PLAT MORPH BLD: NORMAL — SIGNIFICANT CHANGE UP
PLATELET # BLD AUTO: 209 K/UL — SIGNIFICANT CHANGE UP (ref 130–400)
POIKILOCYTOSIS BLD QL AUTO: SLIGHT — SIGNIFICANT CHANGE UP
POLYCHROMASIA BLD QL SMEAR: SIGNIFICANT CHANGE UP
POTASSIUM SERPL-MCNC: 4.8 MMOL/L — SIGNIFICANT CHANGE UP (ref 3.5–5)
POTASSIUM SERPL-SCNC: 4.8 MMOL/L — SIGNIFICANT CHANGE UP (ref 3.5–5)
PROT SERPL-MCNC: 5.7 G/DL — LOW (ref 6–8)
RBC # BLD: 2.65 M/UL — LOW (ref 4.2–5.4)
RBC # FLD: 21.8 % — HIGH (ref 11.5–14.5)
RBC BLD AUTO: ABNORMAL
SCHISTOCYTES BLD QL AUTO: SLIGHT — SIGNIFICANT CHANGE UP
SODIUM SERPL-SCNC: 131 MMOL/L — LOW (ref 135–146)
SPHEROCYTES BLD QL SMEAR: SLIGHT — SIGNIFICANT CHANGE UP
TOXIC GRANULES BLD QL SMEAR: PRESENT — SIGNIFICANT CHANGE UP
WBC # BLD: 19.81 K/UL — HIGH (ref 4.8–10.8)
WBC # FLD AUTO: 19.81 K/UL — HIGH (ref 4.8–10.8)

## 2021-04-01 PROCEDURE — 99233 SBSQ HOSP IP/OBS HIGH 50: CPT

## 2021-04-01 RX ORDER — ENOXAPARIN SODIUM 100 MG/ML
40 INJECTION SUBCUTANEOUS DAILY
Refills: 0 | Status: DISCONTINUED | OUTPATIENT
Start: 2021-04-01 | End: 2021-04-01

## 2021-04-01 RX ORDER — ENOXAPARIN SODIUM 100 MG/ML
80 INJECTION SUBCUTANEOUS EVERY 12 HOURS
Refills: 0 | Status: DISCONTINUED | OUTPATIENT
Start: 2021-04-01 | End: 2021-04-05

## 2021-04-01 RX ADMIN — SENNA PLUS 2 TABLET(S): 8.6 TABLET ORAL at 21:07

## 2021-04-01 RX ADMIN — MORPHINE SULFATE 15 MILLIGRAM(S): 50 CAPSULE, EXTENDED RELEASE ORAL at 18:13

## 2021-04-01 RX ADMIN — ENOXAPARIN SODIUM 80 MILLIGRAM(S): 100 INJECTION SUBCUTANEOUS at 17:47

## 2021-04-01 RX ADMIN — Medication 5 MILLIGRAM(S): at 14:59

## 2021-04-01 RX ADMIN — CHLORHEXIDINE GLUCONATE 1 APPLICATION(S): 213 SOLUTION TOPICAL at 05:10

## 2021-04-01 RX ADMIN — PANTOPRAZOLE SODIUM 40 MILLIGRAM(S): 20 TABLET, DELAYED RELEASE ORAL at 05:09

## 2021-04-01 RX ADMIN — ENOXAPARIN SODIUM 40 MILLIGRAM(S): 100 INJECTION SUBCUTANEOUS at 11:19

## 2021-04-01 RX ADMIN — Medication 5 MILLIGRAM(S): at 05:09

## 2021-04-01 RX ADMIN — Medication 5 MILLIGRAM(S): at 21:07

## 2021-04-01 RX ADMIN — LIDOCAINE 1 PATCH: 4 CREAM TOPICAL at 08:00

## 2021-04-01 RX ADMIN — LIDOCAINE 1 PATCH: 4 CREAM TOPICAL at 07:14

## 2021-04-01 RX ADMIN — Medication 1 TABLET(S): at 11:19

## 2021-04-01 RX ADMIN — MORPHINE SULFATE 15 MILLIGRAM(S): 50 CAPSULE, EXTENDED RELEASE ORAL at 05:07

## 2021-04-01 RX ADMIN — Medication 500 MILLIGRAM(S): at 11:19

## 2021-04-01 RX ADMIN — POLYETHYLENE GLYCOL 3350 17 GRAM(S): 17 POWDER, FOR SOLUTION ORAL at 11:20

## 2021-04-01 RX ADMIN — ENOXAPARIN SODIUM 80 MILLIGRAM(S): 100 INJECTION SUBCUTANEOUS at 05:08

## 2021-04-01 RX ADMIN — MORPHINE SULFATE 15 MILLIGRAM(S): 50 CAPSULE, EXTENDED RELEASE ORAL at 17:46

## 2021-04-01 NOTE — PROGRESS NOTE ADULT - ASSESSMENT
70 years old female from home and lives alone with PMHx of OA of bilateral knees, right rotator ruff tear (?), gout, diverticulosis, presented with difficulty ambulation, found to have metastatic adenocarcinoma and right hip fracture, sent to  for orthopedic oncology s/p hemiarthroplasty.    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 Metastatic osteolytic right scapular lesion. Acute mildly displaced left glenoid fracture and age-indeterminate T11 compression deformity. Left greater than right bilateral adrenal nodularity, likely metastatic.  - Bone scan : Metastatic sites  include right and left ilium, right sacroiliac joint, L2, T8, T11 (photopenic) and the right and left femoral intertrochanteric ridges.Increased uptake in the right 5, 6, 7, 8 anterior ribs suggesting rib fractures and in the left sixth anterior rib most likely representing rib fracture  - MRI brain showed parenchymal lesion as well as likely leptomeningeal disease  - MR spine showed diffuse mets including T2, T6, T7, T11, T12, L2, L5 with likely leptomeningeal involvement  - She is not a candidate for chemotherapy nor does the patient want it  - She is not a candidate for radiation due to the extensive leptomeningeal involvement  - Her TPS score was 0, so she is not a candidate for immunotherapy alone  - The patient and her family are still awaiting the results of NGS to see if she has any actionable mutations, if these are negative then we will recommend hospice. If she does have a mutation we can offer treatment. In the interim, recommend aggressive pain management and supportive care  - Started on morphine Er 15mg BID + IR PRN  - Start marinol 5mg BID  - Can start 2mg decadron for cerebral edema until molecular studies return    # Gastrox thrombus: Distal thrombus however with cancer and immobility it is reasonable to treat  - On therapeutic lovenox    # Non-displaced fracture of Rt Femoral neck s/p right hemiarthroplasty at [facility]    # Recurrent malignant pleural effusion  - s/p Thora 3/15 : 1L fluid removed; cytology positive for adenocarcinoma  - s/p PleurX 3/25; intermediate drainange

## 2021-04-01 NOTE — PROGRESS NOTE ADULT - ASSESSMENT
70yFemale being evaluated for goals of care and symptom management. Patient started on MS contin. Still fatigued and weak.     MEDD (morphine equivalent daily dose): 30  mg/24hrs       See Recs below.    Please call [phone] with questions or concerns 24/7.   We will continue to follow.     Discussed with Rn and resident

## 2021-04-01 NOTE — PROGRESS NOTE ADULT - SUBJECTIVE AND OBJECTIVE BOX
DOV HENLEY 70y Female  MRN#: [id number]   Hospital Day: 9d    SUBJECTIVE  Patient is a 70y old Female who presents with a chief complaint of hip pain, (01 Apr 2021 11:33)  Currently admitted to medicine with the primary diagnosis of     INTERVAL HPI AND OVERNIGHT EVENTS:  Patient was examined and seen at bedside. This morning she is resting comfortably in bed.    REVIEW OF SYMPTOMS:  +back pain, decreased appetite. Denies any CP, abdominal pain, n/v/d/c    OBJECTIVE  PAST MEDICAL & SURGICAL HISTORY  OA (osteoarthritis)    Gout    Diverticulosis    H/O total hysterectomy      ALLERGIES:  moxifloxacin (Anaphylaxis (Moderate))    MEDICATIONS:  STANDING MEDICATIONS  ascorbic acid 500 milliGRAM(s) Oral daily  chlorhexidine 4% Liquid 1 Application(s) Topical <User Schedule>  dronabinol 5 milliGRAM(s) Oral three times a day  enoxaparin Injectable 40 milliGRAM(s) SubCutaneous daily  morphine ER Tablet 15 milliGRAM(s) Oral every 12 hours  multivitamin 1 Tablet(s) Oral daily  pantoprazole    Tablet 40 milliGRAM(s) Oral before breakfast  polyethylene glycol 3350 17 Gram(s) Oral daily  senna 2 Tablet(s) Oral at bedtime    PRN MEDICATIONS  bisacodyl Suppository 10 milliGRAM(s) Rectal daily PRN  lidocaine   Patch 1 Patch Transdermal daily PRN  morphine  IR 7.5 milliGRAM(s) Oral every 4 hours PRN      VITAL SIGNS: Last 24 Hours  T(C): 35.7 (01 Apr 2021 08:00), Max: 37 (31 Mar 2021 15:34)  T(F): 96.2 (01 Apr 2021 08:00), Max: 98.6 (31 Mar 2021 15:34)  HR: 96 (01 Apr 2021 08:00) (84 - 103)  BP: 110/55 (01 Apr 2021 08:00) (110/55 - 115/65)  BP(mean): --  RR: 18 (01 Apr 2021 08:00) (17 - 18)  SpO2: 96% (01 Apr 2021 00:00) (96% - 96%)    LABS:                        8.6    19.81 )-----------( 209      ( 01 Apr 2021 07:02 )             26.8     04-01    131<L>  |  90<L>  |  20  ----------------------------<  99  4.8   |  27  |  0.5<L>    Ca    10.1      01 Apr 2021 07:02  Mg     2.1     04-01    TPro  5.7<L>  /  Alb  2.9<L>  /  TBili  2.1<H>  /  DBili  x   /  AST  33  /  ALT  16  /  AlkPhos  185<H>  04-01                  RADIOLOGY:      PHYSICAL EXAM:  CONSTITUTIONAL: No acute distress, ill appearing, well-developed, AAOx3  HEAD: Atraumatic, normocephalic  EYES: EOMI  ENT: Supple  PULMONARY: Apices clear BL. Rt sided decreased more than L  CARDIOVASCULAR: Regular rate and rhythm  GASTROINTESTINAL: Soft, non-tender, non-distended  MUSCULOSKELETAL: Chest wall tenderness to palpation, LL swelling BL R>L  NEUROLOGY: non-focal  SKIN: intact    ASSESSMENT & PLAN  70 years old female from home and lives alone with PMHx of OA of bilateral knees, right rotator ruff tear (?), gout, diverticulosis, presented with difficulty ambulation due to worsening pain on hips and shoulders. 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 Metastatic osteolytic right scapular lesion. Acute mildly displaced left glenoid fracture and age-indeterminate T11 compression deformity, underlying pathologic fractures are not excluded. Left greater than right bilateral adrenal nodularity, likely metastatic.  - s/p thoracentesis 3/15 (drained 1L): +ve for malignant cells (adenocarcinoma)   - NM Bone scan "consistent with metastatic bone disease. Metastatic sites  include right and left ilium, right sacroiliac joint, L2, T8, T11 (photopenic) and the right and left femoral intertrochanteric ridges."  - Pulmonary following--s/p PleurX catheter 3/25, drainage per pulm-->pleural fluid path results +ve for adenoCA but also candida fungi present-->discussed w/ pulm, they will review report  - MRI performed--shows diffuse mets including brain  - Heme/Onc following--appreciate recs--heme/onc still awaiting results of tumor markers necessary for targeted treatment options  - f/u Rad/Onc as outpt 3-4 wks  - Pain regimen per palliative team  - Pt stable on 6L NC    # Nondisplaced femoral neck subcapital impaction fracture s/p corrective surgery at [facility]  - Pain control with Tylenol   - CT Pelvis Bony Only No Cont (03.14.21 @ 22:01): Multiple infiltrative bone lesions, largest at the right iliac crest with nondisplaced pathologic fracture and soft tissue mass extension. Findings are compatible with osseous metastasis. Consider nuclear medicine bone scan for the assessment of skeletal metastasis. Nondisplaced right subcapital femoral neck impaction fracture. Consider MRI of the right hip to evaluate extent of disease.  - Ortho following--appreciate recs    #L Gastrocnemius DVT  - Pt had c/o leg swelling on Saturday-->va duplex ordered  - Duplex 3/28 +ve for DVT of the left gastrocnemius vein  - c/w therapeutic lovenox    #Hyponatremia--possibly due to malignancy  - fluid restriction 1 L  - follow up BMP     # Mild hypercalcemia - resolved   - Ca 10.4, albumin 4.4  - Likely secondary to dehydration (patient reports not eating or drinking much because it was difficult for her to get around)    # Gout  - Not on any medication  - Will avoid red meat diet while inpatient    DVT ppx: Therapeutic lovenox  GI ppx: Protonix  Diet: Regular  Ambulatory status: as tolerated,   Code status: DNR/I    PAST MEDICAL & SURGICAL HISTORY:  OA (osteoarthritis)    Gout    Diverticulosis    H/O total hysterectomy     DOV HENLEY 70y Female  MRN#: [id number]   Hospital Day: 9d    SUBJECTIVE  Patient is a 70y old Female who presents with a chief complaint of hip pain, (01 Apr 2021 11:33)  Currently admitted to medicine with the primary diagnosis of     INTERVAL HPI AND OVERNIGHT EVENTS:  Patient was examined and seen at bedside. This morning she is resting comfortably in bed.    REVIEW OF SYMPTOMS:  +back pain, decreased appetite. Denies any CP, abdominal pain, n/v/d/c    OBJECTIVE  PAST MEDICAL & SURGICAL HISTORY  OA (osteoarthritis)    Gout    Diverticulosis    H/O total hysterectomy      ALLERGIES:  moxifloxacin (Anaphylaxis (Moderate))    MEDICATIONS:  STANDING MEDICATIONS  ascorbic acid 500 milliGRAM(s) Oral daily  chlorhexidine 4% Liquid 1 Application(s) Topical <User Schedule>  dronabinol 5 milliGRAM(s) Oral three times a day  enoxaparin Injectable 40 milliGRAM(s) SubCutaneous daily  morphine ER Tablet 15 milliGRAM(s) Oral every 12 hours  multivitamin 1 Tablet(s) Oral daily  pantoprazole    Tablet 40 milliGRAM(s) Oral before breakfast  polyethylene glycol 3350 17 Gram(s) Oral daily  senna 2 Tablet(s) Oral at bedtime    PRN MEDICATIONS  bisacodyl Suppository 10 milliGRAM(s) Rectal daily PRN  lidocaine   Patch 1 Patch Transdermal daily PRN  morphine  IR 7.5 milliGRAM(s) Oral every 4 hours PRN      VITAL SIGNS: Last 24 Hours  T(C): 35.7 (01 Apr 2021 08:00), Max: 37 (31 Mar 2021 15:34)  T(F): 96.2 (01 Apr 2021 08:00), Max: 98.6 (31 Mar 2021 15:34)  HR: 96 (01 Apr 2021 08:00) (84 - 103)  BP: 110/55 (01 Apr 2021 08:00) (110/55 - 115/65)  BP(mean): --  RR: 18 (01 Apr 2021 08:00) (17 - 18)  SpO2: 96% (01 Apr 2021 00:00) (96% - 96%)    LABS:                        8.6    19.81 )-----------( 209      ( 01 Apr 2021 07:02 )             26.8     04-01    131<L>  |  90<L>  |  20  ----------------------------<  99  4.8   |  27  |  0.5<L>    Ca    10.1      01 Apr 2021 07:02  Mg     2.1     04-01    TPro  5.7<L>  /  Alb  2.9<L>  /  TBili  2.1<H>  /  DBili  x   /  AST  33  /  ALT  16  /  AlkPhos  185<H>  04-01                  RADIOLOGY:      PHYSICAL EXAM:  CONSTITUTIONAL: No acute distress, ill appearing but comfortable, AAOx3  HEAD: Atraumatic, normocephalic  EYES: EOMI  ENT: Supple  PULMONARY: Apices clear BL. Rt sided decreased more than L  CARDIOVASCULAR: Regular rate and rhythm  GASTROINTESTINAL: Soft, non-tender, non-distended  MUSCULOSKELETAL: Chest wall tenderness to palpation, LL swelling BL R>L  NEUROLOGY: non-focal  SKIN: intact    ASSESSMENT & PLAN  70 years old female from home and lives alone with PMHx of OA of bilateral knees, right rotator ruff tear (?), gout, diverticulosis, presented with difficulty ambulation due to worsening pain on hips and shoulders. 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 Metastatic osteolytic right scapular lesion. Acute mildly displaced left glenoid fracture and age-indeterminate T11 compression deformity, underlying pathologic fractures are not excluded. Left greater than right bilateral adrenal nodularity, likely metastatic.  - s/p thoracentesis 3/15 (drained 1L): +ve for malignant cells (adenocarcinoma)   - NM Bone scan "consistent with metastatic bone disease. Metastatic sites  include right and left ilium, right sacroiliac joint, L2, T8, T11 (photopenic) and the right and left femoral intertrochanteric ridges."  - Pulmonary following--s/p PleurX catheter 3/25, drainage per pulm-->pleural fluid path results +ve for adenoCA but also candida fungi present-->discussed w/ pulm, they will review report  - MRI performed--shows diffuse mets including brain  - f/u Rad/Onc as outpt 3-4 wks  - Heme/Onc following  > The patient and her family are still awaiting the results of NGS to see if she has any actionable mutations, if these are negative then we will recommend hospice. If she does have a mutation we can offer treatment. In the interim, recommend aggressive pain management and supportive care  > Marinol 5mg BID added for appetite  - Pain regimen per palliative team  - Pt stable on 6L NC    # Nondisplaced femoral neck subcapital impaction fracture s/p corrective surgery at [facility]  - Pain control with Tylenol   - CT Pelvis Bony Only No Cont (03.14.21 @ 22:01): Multiple infiltrative bone lesions, largest at the right iliac crest with nondisplaced pathologic fracture and soft tissue mass extension. Findings are compatible with osseous metastasis. Consider nuclear medicine bone scan for the assessment of skeletal metastasis. Nondisplaced right subcapital femoral neck impaction fracture. Consider MRI of the right hip to evaluate extent of disease.  - Ortho following--appreciate recs    #L Gastrocnemius DVT  - Pt had c/o leg swelling on Saturday-->va duplex ordered  - Duplex 3/28 +ve for DVT of the left gastrocnemius vein  - c/w therapeutic lovenox--s/w neurology, AC decision not based on brain, but rather primary cancer spread to brain. No contraindication to AC with lung adenoCA    #Hyponatremia--possibly due to malignancy  - fluid restriction 1 L  - follow up BMP     # Mild hypercalcemia - resolved   - Ca 10.4, albumin 4.4  - Likely secondary to dehydration (patient reports not eating or drinking much because it was difficult for her to get around)    # Gout  - Not on any medication  - Will avoid red meat diet while inpatient    DVT ppx: Therapeutic lovenox  GI ppx: Protonix  Diet: Regular  Ambulatory status: as tolerated,   Code status: DNR/I    PAST MEDICAL & SURGICAL HISTORY:  OA (osteoarthritis)    Gout    Diverticulosis    H/O total hysterectomy     DOV HENLEY 70y Female  MRN#: [id number]   Hospital Day: 9d    SUBJECTIVE  Patient is a 70y old Female who presents with a chief complaint of hip pain, (01 Apr 2021 11:33)  Currently admitted to medicine with the primary diagnosis of     INTERVAL HPI AND OVERNIGHT EVENTS:  Patient was examined and seen at bedside. This morning she is resting comfortably in bed.    REVIEW OF SYMPTOMS:  +back pain, decreased appetite. Denies any CP, abdominal pain, n/v/d/c    OBJECTIVE  PAST MEDICAL & SURGICAL HISTORY  OA (osteoarthritis)    Gout    Diverticulosis    H/O total hysterectomy      ALLERGIES:  moxifloxacin (Anaphylaxis (Moderate))    MEDICATIONS:  STANDING MEDICATIONS  ascorbic acid 500 milliGRAM(s) Oral daily  chlorhexidine 4% Liquid 1 Application(s) Topical <User Schedule>  dronabinol 5 milliGRAM(s) Oral three times a day  enoxaparin Injectable 40 milliGRAM(s) SubCutaneous daily  morphine ER Tablet 15 milliGRAM(s) Oral every 12 hours  multivitamin 1 Tablet(s) Oral daily  pantoprazole    Tablet 40 milliGRAM(s) Oral before breakfast  polyethylene glycol 3350 17 Gram(s) Oral daily  senna 2 Tablet(s) Oral at bedtime    PRN MEDICATIONS  bisacodyl Suppository 10 milliGRAM(s) Rectal daily PRN  lidocaine   Patch 1 Patch Transdermal daily PRN  morphine  IR 7.5 milliGRAM(s) Oral every 4 hours PRN      VITAL SIGNS: Last 24 Hours  T(C): 35.7 (01 Apr 2021 08:00), Max: 37 (31 Mar 2021 15:34)  T(F): 96.2 (01 Apr 2021 08:00), Max: 98.6 (31 Mar 2021 15:34)  HR: 96 (01 Apr 2021 08:00) (84 - 103)  BP: 110/55 (01 Apr 2021 08:00) (110/55 - 115/65)  BP(mean): --  RR: 18 (01 Apr 2021 08:00) (17 - 18)  SpO2: 96% (01 Apr 2021 00:00) (96% - 96%)    LABS:                        8.6    19.81 )-----------( 209      ( 01 Apr 2021 07:02 )             26.8     04-01    131<L>  |  90<L>  |  20  ----------------------------<  99  4.8   |  27  |  0.5<L>    Ca    10.1      01 Apr 2021 07:02  Mg     2.1     04-01    TPro  5.7<L>  /  Alb  2.9<L>  /  TBili  2.1<H>  /  DBili  x   /  AST  33  /  ALT  16  /  AlkPhos  185<H>  04-01    RADIOLOGY:      PHYSICAL EXAM:  CONSTITUTIONAL: No acute distress, ill appearing but comfortable, AAOx3  HEAD: Atraumatic, normocephalic  EYES: EOMI  ENT: Supple  PULMONARY: Apices clear BL. Rt sided decreased more than L  CARDIOVASCULAR: Regular rate and rhythm  GASTROINTESTINAL: Soft, non-tender, non-distended  MUSCULOSKELETAL: Chest wall tenderness to palpation, LL swelling BL R>L  NEUROLOGY: non-focal  SKIN: intact    ASSESSMENT & PLAN  70 years old female from home and lives alone with PMHx of OA of bilateral knees, right rotator ruff tear (?), gout, diverticulosis, presented with difficulty ambulation due to worsening pain on hips and shoulders. 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 Metastatic osteolytic right scapular lesion. Acute mildly displaced left glenoid fracture and age-indeterminate T11 compression deformity, underlying pathologic fractures are not excluded. Left greater than right bilateral adrenal nodularity, likely metastatic.  - s/p thoracentesis 3/15 (drained 1L): +ve for malignant cells (adenocarcinoma)   - NM Bone scan "consistent with metastatic bone disease. Metastatic sites  include right and left ilium, right sacroiliac joint, L2, T8, T11 (photopenic) and the right and left femoral intertrochanteric ridges."  - Pulmonary following--s/p PleurX catheter 3/25, drainage per pulm-->pleural fluid path results +ve for adenoCA but also candida fungi present-->discussed w/ pulm, they will review report  - MRI performed--shows diffuse mets including brain  - f/u Rad/Onc as outpt 3-4 wks  - Heme/Onc following  > The patient and her family are still awaiting the results of NGS to see if she has any actionable mutations, if these are negative then we will recommend hospice. If she does have a mutation we can offer treatment. In the interim, recommend aggressive pain management and supportive care  > Marinol 5mg BID added for appetite  - Pain regimen per palliative team  - Pt stable on 6L NC    # Nondisplaced femoral neck subcapital impaction fracture s/p corrective surgery at [facility]  - Pain control with Tylenol   - CT Pelvis Bony Only No Cont (03.14.21 @ 22:01): Multiple infiltrative bone lesions, largest at the right iliac crest with nondisplaced pathologic fracture and soft tissue mass extension. Findings are compatible with osseous metastasis. Consider nuclear medicine bone scan for the assessment of skeletal metastasis. Nondisplaced right subcapital femoral neck impaction fracture. Consider MRI of the right hip to evaluate extent of disease.  - Ortho following--appreciate recs    #L Gastrocnemius DVT  - Pt had c/o leg swelling on Saturday-->va duplex ordered  - Duplex 3/28 +ve for DVT of the left gastrocnemius vein  - c/w therapeutic lovenox--s/w neurology, AC decision not based on brain, but rather primary cancer spread to brain. No contraindication to AC with lung adenoCA-->per literature, Lung adeno has low risk of bleeding    #Hyponatremia--possibly due to malignancy  - fluid restriction 1 L  - follow up BMP     # Mild hypercalcemia - resolved   - Ca 10.4, albumin 4.4  - Likely secondary to dehydration (patient reports not eating or drinking much because it was difficult for her to get around)    # Gout  - Not on any medication  - Will avoid red meat diet while inpatient    DVT ppx: Therapeutic lovenox  GI ppx: Protonix  Diet: Regular  Ambulatory status: as tolerated,   Code status: DNR/I    PAST MEDICAL & SURGICAL HISTORY:  OA (osteoarthritis)    Gout    Diverticulosis    H/O total hysterectomy     DOV HENLEY 70y Female  MRN#: [id number]   Hospital Day: 9d    SUBJECTIVE  Patient is a 70y old Female who presents with a chief complaint of hip pain, (01 Apr 2021 11:33)  Currently admitted to medicine with the primary diagnosis of     INTERVAL HPI AND OVERNIGHT EVENTS:  Patient was examined and seen at bedside. This morning she is resting comfortably in bed.    REVIEW OF SYMPTOMS:  +back pain, decreased appetite. Denies any CP, abdominal pain, n/v/d/c    OBJECTIVE  PAST MEDICAL & SURGICAL HISTORY  OA (osteoarthritis)    Gout    Diverticulosis    H/O total hysterectomy      ALLERGIES:  moxifloxacin (Anaphylaxis (Moderate))    MEDICATIONS:  STANDING MEDICATIONS  ascorbic acid 500 milliGRAM(s) Oral daily  chlorhexidine 4% Liquid 1 Application(s) Topical <User Schedule>  dronabinol 5 milliGRAM(s) Oral three times a day  enoxaparin Injectable 40 milliGRAM(s) SubCutaneous daily  morphine ER Tablet 15 milliGRAM(s) Oral every 12 hours  multivitamin 1 Tablet(s) Oral daily  pantoprazole    Tablet 40 milliGRAM(s) Oral before breakfast  polyethylene glycol 3350 17 Gram(s) Oral daily  senna 2 Tablet(s) Oral at bedtime    PRN MEDICATIONS  bisacodyl Suppository 10 milliGRAM(s) Rectal daily PRN  lidocaine   Patch 1 Patch Transdermal daily PRN  morphine  IR 7.5 milliGRAM(s) Oral every 4 hours PRN      VITAL SIGNS: Last 24 Hours  T(C): 35.7 (01 Apr 2021 08:00), Max: 37 (31 Mar 2021 15:34)  T(F): 96.2 (01 Apr 2021 08:00), Max: 98.6 (31 Mar 2021 15:34)  HR: 96 (01 Apr 2021 08:00) (84 - 103)  BP: 110/55 (01 Apr 2021 08:00) (110/55 - 115/65)  BP(mean): --  RR: 18 (01 Apr 2021 08:00) (17 - 18)  SpO2: 96% (01 Apr 2021 00:00) (96% - 96%)    LABS:                        8.6    19.81 )-----------( 209      ( 01 Apr 2021 07:02 )             26.8     04-01    131<L>  |  90<L>  |  20  ----------------------------<  99  4.8   |  27  |  0.5<L>    Ca    10.1      01 Apr 2021 07:02  Mg     2.1     04-01    TPro  5.7<L>  /  Alb  2.9<L>  /  TBili  2.1<H>  /  DBili  x   /  AST  33  /  ALT  16  /  AlkPhos  185<H>  04-01    RADIOLOGY:      PHYSICAL EXAM:  CONSTITUTIONAL: No acute distress, ill appearing but comfortable, AAOx3  HEAD: Atraumatic, normocephalic  EYES: EOMI  ENT: Supple  PULMONARY: Apices clear BL. Rt sided decreased more than L  CARDIOVASCULAR: Regular rate and rhythm  GASTROINTESTINAL: Soft, non-tender, non-distended  MUSCULOSKELETAL: Chest wall tenderness to palpation, LL swelling BL R>L  NEUROLOGY: non-focal  SKIN: intact    ASSESSMENT & PLAN  70 years old female from home and lives alone with PMHx of OA of bilateral knees, right rotator ruff tear (?), gout, diverticulosis, presented with difficulty ambulation due to worsening pain on hips and shoulders. 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 Metastatic osteolytic right scapular lesion. Acute mildly displaced left glenoid fracture and age-indeterminate T11 compression deformity, underlying pathologic fractures are not excluded. Left greater than right bilateral adrenal nodularity, likely metastatic.  - s/p thoracentesis 3/15 (drained 1L): +ve for malignant cells (adenocarcinoma)   - NM Bone scan "consistent with metastatic bone disease. Metastatic sites  include right and left ilium, right sacroiliac joint, L2, T8, T11 (photopenic) and the right and left femoral intertrochanteric ridges."  - Pulmonary following--s/p PleurX catheter 3/25, drainage per pulm  - MRI performed--shows diffuse mets including brain  - f/u Rad/Onc as outpt 3-4 wks  - Heme/Onc following  > The patient and her family are still awaiting the results of NGS to see if she has any actionable mutations, if these are negative then we will recommend hospice. If she does have a mutation we can offer treatment. In the interim, recommend aggressive pain management and supportive care  > Marinol 5mg BID added for appetite  - Pain regimen per palliative team  - Pt stable on 6L NC    # Nondisplaced femoral neck subcapital impaction fracture s/p corrective surgery at [facility]  - Pain control with Tylenol   - CT Pelvis Bony Only No Cont (03.14.21 @ 22:01): Multiple infiltrative bone lesions, largest at the right iliac crest with nondisplaced pathologic fracture and soft tissue mass extension. Findings are compatible with osseous metastasis. Consider nuclear medicine bone scan for the assessment of skeletal metastasis. Nondisplaced right subcapital femoral neck impaction fracture. Consider MRI of the right hip to evaluate extent of disease.  - Ortho following--appreciate recs    #L Gastrocnemius DVT  - Pt had c/o leg swelling on Saturday-->va duplex ordered  - Duplex 3/28 +ve for DVT of the left gastrocnemius vein  - c/w therapeutic lovenox--s/w neurology, AC decision not based on brain, but rather primary cancer spread to brain. No contraindication to AC with lung adenoCA-->per literature, Lung adeno has low risk of bleeding    #Hyponatremia--possibly due to malignancy  - fluid restriction 1 L  - follow up BMP     # Mild hypercalcemia - resolved   - Ca 10.4, albumin 4.4  - Likely secondary to dehydration (patient reports not eating or drinking much because it was difficult for her to get around)    # Gout  - Not on any medication  - Will avoid red meat diet while inpatient    DVT ppx: Therapeutic lovenox  GI ppx: Protonix  Diet: Regular  Ambulatory status: as tolerated,   Code status: DNR/I    PAST MEDICAL & SURGICAL HISTORY:  OA (osteoarthritis)    Gout    Diverticulosis    H/O total hysterectomy     DOV HENLEY 70y Female  MRN#: [id number]   Hospital Day: 9d    SUBJECTIVE  Patient is a 70y old Female who presents with a chief complaint of hip pain, (01 Apr 2021 11:33)  Currently admitted to medicine with the primary diagnosis of     INTERVAL HPI AND OVERNIGHT EVENTS:  Patient was examined and seen at bedside. This morning she is resting comfortably in bed.  no cp, sob, abd pain, fever  no ha, syncope, lightheadedness, dizziness  REVIEW OF SYMPTOMS:  +back pain, decreased appetite. Denies any CP, abdominal pain, n/v/d/c    OBJECTIVE  PAST MEDICAL & SURGICAL HISTORY  OA (osteoarthritis)    Gout    Diverticulosis    H/O total hysterectomy      ALLERGIES:  moxifloxacin (Anaphylaxis (Moderate))    MEDICATIONS:  STANDING MEDICATIONS  ascorbic acid 500 milliGRAM(s) Oral daily  chlorhexidine 4% Liquid 1 Application(s) Topical <User Schedule>  dronabinol 5 milliGRAM(s) Oral three times a day  enoxaparin Injectable 40 milliGRAM(s) SubCutaneous daily  morphine ER Tablet 15 milliGRAM(s) Oral every 12 hours  multivitamin 1 Tablet(s) Oral daily  pantoprazole    Tablet 40 milliGRAM(s) Oral before breakfast  polyethylene glycol 3350 17 Gram(s) Oral daily  senna 2 Tablet(s) Oral at bedtime    PRN MEDICATIONS  bisacodyl Suppository 10 milliGRAM(s) Rectal daily PRN  lidocaine   Patch 1 Patch Transdermal daily PRN  morphine  IR 7.5 milliGRAM(s) Oral every 4 hours PRN      VITAL SIGNS: Last 24 Hours  T(C): 35.7 (01 Apr 2021 08:00), Max: 37 (31 Mar 2021 15:34)  T(F): 96.2 (01 Apr 2021 08:00), Max: 98.6 (31 Mar 2021 15:34)  HR: 96 (01 Apr 2021 08:00) (84 - 103)  BP: 110/55 (01 Apr 2021 08:00) (110/55 - 115/65)  BP(mean): --  RR: 18 (01 Apr 2021 08:00) (17 - 18)  SpO2: 96% (01 Apr 2021 00:00) (96% - 96%)    LABS:                        8.6    19.81 )-----------( 209      ( 01 Apr 2021 07:02 )             26.8     04-01    131<L>  |  90<L>  |  20  ----------------------------<  99  4.8   |  27  |  0.5<L>    Ca    10.1      01 Apr 2021 07:02  Mg     2.1     04-01    TPro  5.7<L>  /  Alb  2.9<L>  /  TBili  2.1<H>  /  DBili  x   /  AST  33  /  ALT  16  /  AlkPhos  185<H>  04-01    RADIOLOGY:      PHYSICAL EXAM:  CONSTITUTIONAL: No acute distress, ill appearing but comfortable, AAOx3  HEAD: Atraumatic, normocephalic  EYES: EOMI  ENT: Supple  PULMONARY: Apices clear BL. Rt sided decreased more than L  CARDIOVASCULAR: Regular rate and rhythm  GASTROINTESTINAL: Soft, non-tender, non-distended  MUSCULOSKELETAL: Chest wall tenderness to palpation, LL swelling BL R>L  NEUROLOGY: non-focal  SKIN: intact    ASSESSMENT & PLAN  70 years old female from home and lives alone with PMHx of OA of bilateral knees, right rotator ruff tear (?), gout, diverticulosis, presented with difficulty ambulation due to worsening pain on hips and shoulders. Pt found to have femoral neck fracture, transferred to 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 Metastatic osteolytic right scapular lesion. Acute mildly displaced left glenoid fracture and age-indeterminate T11 compression deformity, underlying pathologic fractures are not excluded. Left greater than right bilateral adrenal nodularity, likely metastatic.  - s/p thoracentesis 3/15 (drained 1L): +ve for malignant cells (adenocarcinoma)   - NM Bone scan "consistent with metastatic bone disease. Metastatic sites  include right and left ilium, right sacroiliac joint, L2, T8, T11 (photopenic) and the right and left femoral intertrochanteric ridges."  - Pulmonary following--s/p PleurX catheter 3/25, drainage per pulm  - MRI performed--shows diffuse mets including brain  - f/u Rad/Onc as outpt 3-4 wks  - Heme/Onc following  > The patient and her family are still awaiting the results of NGS to see if she has any actionable mutations, if these are negative then we will recommend hospice. If she does have a mutation we can offer treatment. In the interim, recommend aggressive pain management and supportive care  > Marinol 5mg BID added for appetite  - Pain regimen per palliative team  - Pt stable on 6L NC    # Nondisplaced femoral neck subcapital impaction fracture s/p corrective surgery at [facility]  - Pain control with Tylenol   - CT Pelvis Bony Only No Cont (03.14.21 @ 22:01): Multiple infiltrative bone lesions, largest at the right iliac crest with nondisplaced pathologic fracture and soft tissue mass extension. Findings are compatible with osseous metastasis. Consider nuclear medicine bone scan for the assessment of skeletal metastasis. Nondisplaced right subcapital femoral neck impaction fracture. Consider MRI of the right hip to evaluate extent of disease.  - Ortho following--appreciate recs    #L Gastrocnemius DVT  - Pt had c/o leg swelling on Saturday-->va duplex ordered  - Duplex 3/28 +ve for DVT of the left gastrocnemius vein  - c/w therapeutic lovenox--s/w neurology, AC decision not based on brain, but rather primary cancer spread to brain. No contraindication to AC with lung adenoCA-->per literature, Lung adeno has low risk of bleeding    #Hyponatremia--possibly due to malignancy  - fluid restriction 1 L  - follow up BMP     # Mild hypercalcemia - resolved   - Ca 10.4, albumin 4.4  - Likely secondary to dehydration (patient reports not eating or drinking much because it was difficult for her to get around)    # Gout  - Not on any medication  - Will avoid red meat diet while inpatient    DVT ppx: Therapeutic lovenox  GI ppx: Protonix  Diet: Regular  Ambulatory status: as tolerated,   Code status: DNR/I    PAST MEDICAL & SURGICAL HISTORY:  OA (osteoarthritis)    Gout    Diverticulosis    H/O total hysterectomy

## 2021-04-01 NOTE — PROGRESS NOTE ADULT - SUBJECTIVE AND OBJECTIVE BOX
DOV HENLEY             MRN-[id number]    CC: right hip/back pain     HPI: increasing back/hip pain with debility. Admitted 3/13/21, s/p right hip arthropasty at [facility] and (+) metastatic lung cancer new dx    SUBJECTIVE: Patient seen and examined at bedside. She was fatigued and weak. Patient states she had a BM.     ROS:  DYSPNEA: Yes	  NAUS/VOM: No 	  SECRETIONS: No 	  AGITATION: No   Pain (Y/N): Yes back and right hip   -Provocation/Palliation: weight bearing it worsens   -Quality/Quantity: sharp/ throbbing  -Radiating: right hip to back   -Severity: 6/10   -Timing/Frequency: daily constant   -Impact on ADLs: pt has decreased ability to walk or stand     OTHER REVIEW OF SYSTEMS: fatigue and weakness     PEx:  70y  General: AAOx3 , lying in bed  , fatigued and weak   HEENT:  NCAT PERRL EOMI Non icteric MOM  Neck: Supple no masses  CVS: RR S1S2 edema general   Resp: Unlabored (+) tachypneic . decreased BS on right   GI:  Soft NT ND BS+  :   [place]  Musc: No C/C/E    Neuro: Follows commands No focal deficits  Psych: unhappy affect   Skin: Non jaundiced   Lymph: Normal                 Last BM: still no BM documented     ALLERGIES: Moxifloxin      Intolerances dairy, lactose, and shellfish     OPIATE NAÏVE (Y/N): no     MEDICATIONS: REVIEWED  MEDICATIONS  (STANDING):  ascorbic acid 500 milliGRAM(s) Oral daily  chlorhexidine 4% Liquid 1 Application(s) Topical <User Schedule>  dronabinol 5 milliGRAM(s) Oral three times a day  enoxaparin Injectable 40 milliGRAM(s) SubCutaneous daily  morphine ER Tablet 15 milliGRAM(s) Oral every 12 hours  multivitamin 1 Tablet(s) Oral daily  pantoprazole    Tablet 40 milliGRAM(s) Oral before breakfast  polyethylene glycol 3350 17 Gram(s) Oral daily  senna 2 Tablet(s) Oral at bedtime    MEDICATIONS  (PRN):  bisacodyl Suppository 10 milliGRAM(s) Rectal daily PRN Constipation  lidocaine   Patch 1 Patch Transdermal daily PRN Back pain  morphine  IR 7.5 milliGRAM(s) Oral every 4 hours PRN mild to severe pain      LABS: REVIEWED  CBC:                        8.6    19.81 )-----------( 209      ( 01 Apr 2021 07:02 )             26.8     CMP:    04-01    131<L>  |  90<L>  |  20  ----------------------------<  99  4.8   |  27  |  0.5<L>    Ca    10.1      01 Apr 2021 07:02  Mg     2.1     04-01    TPro  5.7<L>  /  Alb  2.9<L>  /  TBili  2.1<H>  /  DBili  x   /  AST  33  /  ALT  16  /  AlkPhos  185<H>  04-01  Albumin, Serum: 2.9 g/dL (04-01-21 @ 07:02)      IMAGING: REVIEWED    MRI T and L spine 3/25: imited study due to motion artifacts.    Diffuse osseous metastasis are noted throughout the thoracic and lumbar spines, worse at T2, T6, T7, T11, T12, L2, and L5 vertebral bodies as well as the sacroiliac bones with heterogeneous enhancements. There is mild retropulsion involving the compression fractures of the T6 and T7, and moderate retropulsion at T11 secondary to posteriorly encroaching soft tissue component.    Mild diffuse circumferential thickening/enhancement of the epidural soft tissues, most prominent at the level of T5-6 and T10-L1, likely representing metastasis.    mild scattered discontinuous enhancements along the nerve roots suspicious for leptomeningeal carcinomatosis.    Moderate diffuse edema/enhancement involving the paraspinal muscles likely representing nonspecific myositis.      EKG reviewed     ADVANCED DIRECTIVES:       DNR/DNI  HCP     DECISION MAKER: Patient   LEGAL SURROGATE: sistermariah Vizcarra and Yaritza     PSYCHOSOCIAL-SPIRITUAL ASSESSMENT:       Reviewed       Care plan unchanged       Care plan adjusted as above	    GOALS OF CARE DISCUSSION       Palliative care info/counseling provided	           Family meeting       Advanced Directives addressed please see Advance Care Planning Note	           See previous Palliative Medicine Note    CURRENT DISPO PLAN:     SNF for rehab when medically stable     REFERRALS	        Palliative Med        Unit SW/Case Mgmt              Speech/Swallow       Patient/Family Support      Hospice       Nutrition       Dietician       PT/OT

## 2021-04-01 NOTE — PROGRESS NOTE ADULT - SUBJECTIVE AND OBJECTIVE BOX
24H events:    Patient appears more confused today  Not complaining of pain  No other acute events    PAST MEDICAL & SURGICAL HISTORY  OA (osteoarthritis)    Gout    Diverticulosis    H/O total hysterectomy      SOCIAL HISTORY:  Negative for smoking/alcohol/drug use.     ALLERGIES:  moxifloxacin (Anaphylaxis (Moderate))    MEDICATIONS:  STANDING MEDICATIONS  ascorbic acid 500 milliGRAM(s) Oral daily  chlorhexidine 4% Liquid 1 Application(s) Topical <User Schedule>  dronabinol 5 milliGRAM(s) Oral three times a day  enoxaparin Injectable 80 milliGRAM(s) SubCutaneous every 12 hours  morphine ER Tablet 15 milliGRAM(s) Oral every 12 hours  multivitamin 1 Tablet(s) Oral daily  pantoprazole    Tablet 40 milliGRAM(s) Oral before breakfast  polyethylene glycol 3350 17 Gram(s) Oral daily  senna 2 Tablet(s) Oral at bedtime    PRN MEDICATIONS  bisacodyl Suppository 10 milliGRAM(s) Rectal daily PRN  lidocaine   Patch 1 Patch Transdermal daily PRN  morphine  IR 7.5 milliGRAM(s) Oral every 4 hours PRN    VITALS:   T(F): 96.2  HR: 96  BP: 110/55  RR: 18  SpO2: 96%    LABS:                        8.6    19.81 )-----------( 209      ( 01 Apr 2021 07:02 )             26.8     04-01    131<L>  |  90<L>  |  20  ----------------------------<  99  4.8   |  27  |  0.5<L>    Ca    10.1      01 Apr 2021 07:02  Mg     2.1     04-01    TPro  5.7<L>  /  Alb  2.9<L>  /  TBili  2.1<H>  /  DBili  x   /  AST  33  /  ALT  16  /  AlkPhos  185<H>  04-01                  RADIOLOGY:  < from: Xray Chest 1 View- PORTABLE-Routine (Xray Chest 1 View- PORTABLE-Routine in AM.) (03.31.21 @ 06:03) >  Impression:  Right perihilar abnormality. Support devices as described.  Pneumothorax seen on the prior study no longer recognized.    PHYSICAL EXAM:  GEN: No acute distress  HENT: NCAT, EOMI  LYMPH: No appreciable adenopathy  LUNGS: Not tachypneic   HEART: regular rate and rhythm  ABD: Soft, non-tender,  SKIN: No rash  NEURO: Patient able to engage in conversation but more confused today

## 2021-04-02 DIAGNOSIS — R06.00 DYSPNEA, UNSPECIFIED: ICD-10-CM

## 2021-04-02 LAB
ALBUMIN SERPL ELPH-MCNC: 2.9 G/DL — LOW (ref 3.5–5.2)
ALP SERPL-CCNC: 168 U/L — HIGH (ref 30–115)
ALT FLD-CCNC: 18 U/L — SIGNIFICANT CHANGE UP (ref 0–41)
ANION GAP SERPL CALC-SCNC: 12 MMOL/L — SIGNIFICANT CHANGE UP (ref 7–14)
AST SERPL-CCNC: 64 U/L — HIGH (ref 0–41)
BASOPHILS # BLD AUTO: 0.14 K/UL — SIGNIFICANT CHANGE UP (ref 0–0.2)
BASOPHILS NFR BLD AUTO: 0.8 % — SIGNIFICANT CHANGE UP (ref 0–1)
BILIRUB SERPL-MCNC: 1.9 MG/DL — HIGH (ref 0.2–1.2)
BUN SERPL-MCNC: 20 MG/DL — SIGNIFICANT CHANGE UP (ref 10–20)
CALCIUM SERPL-MCNC: 10.3 MG/DL — HIGH (ref 8.5–10.1)
CHLORIDE SERPL-SCNC: 89 MMOL/L — LOW (ref 98–110)
CO2 SERPL-SCNC: 27 MMOL/L — SIGNIFICANT CHANGE UP (ref 17–32)
CREAT SERPL-MCNC: 0.7 MG/DL — SIGNIFICANT CHANGE UP (ref 0.7–1.5)
EOSINOPHIL # BLD AUTO: 0.12 K/UL — SIGNIFICANT CHANGE UP (ref 0–0.7)
EOSINOPHIL NFR BLD AUTO: 0.7 % — SIGNIFICANT CHANGE UP (ref 0–8)
FUNGITELL: <31 PG/ML — SIGNIFICANT CHANGE UP
GAS PNL BLDA: SIGNIFICANT CHANGE UP
GLUCOSE SERPL-MCNC: 102 MG/DL — HIGH (ref 70–99)
HCT VFR BLD CALC: 26.5 % — LOW (ref 37–47)
HGB BLD-MCNC: 8.9 G/DL — LOW (ref 12–16)
IMM GRANULOCYTES NFR BLD AUTO: 5.5 % — HIGH (ref 0.1–0.3)
LYMPHOCYTES # BLD AUTO: 1.81 K/UL — SIGNIFICANT CHANGE UP (ref 1.2–3.4)
LYMPHOCYTES # BLD AUTO: 10 % — LOW (ref 20.5–51.1)
MAGNESIUM SERPL-MCNC: 2.1 MG/DL — SIGNIFICANT CHANGE UP (ref 1.8–2.4)
MCHC RBC-ENTMCNC: 33.6 G/DL — SIGNIFICANT CHANGE UP (ref 32–37)
MCHC RBC-ENTMCNC: 33.6 PG — HIGH (ref 27–31)
MCV RBC AUTO: 100 FL — HIGH (ref 81–99)
MONOCYTES # BLD AUTO: 0.93 K/UL — HIGH (ref 0.1–0.6)
MONOCYTES NFR BLD AUTO: 5.1 % — SIGNIFICANT CHANGE UP (ref 1.7–9.3)
NEUTROPHILS # BLD AUTO: 14.17 K/UL — HIGH (ref 1.4–6.5)
NEUTROPHILS NFR BLD AUTO: 77.9 % — HIGH (ref 42.2–75.2)
NRBC # BLD: 0 /100 WBCS — SIGNIFICANT CHANGE UP (ref 0–0)
PLATELET # BLD AUTO: 152 K/UL — SIGNIFICANT CHANGE UP (ref 130–400)
POTASSIUM SERPL-MCNC: 5.2 MMOL/L — HIGH (ref 3.5–5)
POTASSIUM SERPL-SCNC: 5.2 MMOL/L — HIGH (ref 3.5–5)
PROT SERPL-MCNC: 6 G/DL — SIGNIFICANT CHANGE UP (ref 6–8)
RBC # BLD: 2.65 M/UL — LOW (ref 4.2–5.4)
RBC # FLD: 21.9 % — HIGH (ref 11.5–14.5)
SODIUM SERPL-SCNC: 128 MMOL/L — LOW (ref 135–146)
WBC # BLD: 18.16 K/UL — HIGH (ref 4.8–10.8)
WBC # FLD AUTO: 18.16 K/UL — HIGH (ref 4.8–10.8)

## 2021-04-02 PROCEDURE — 99233 SBSQ HOSP IP/OBS HIGH 50: CPT

## 2021-04-02 PROCEDURE — 99497 ADVNCD CARE PLAN 30 MIN: CPT

## 2021-04-02 PROCEDURE — 71045 X-RAY EXAM CHEST 1 VIEW: CPT | Mod: 26

## 2021-04-02 RX ORDER — DEXAMETHASONE 0.5 MG/5ML
2 ELIXIR ORAL THREE TIMES A DAY
Refills: 0 | Status: DISCONTINUED | OUTPATIENT
Start: 2021-04-02 | End: 2021-04-05

## 2021-04-02 RX ORDER — DEXAMETHASONE 0.5 MG/5ML
2 ELIXIR ORAL ONCE
Refills: 0 | Status: COMPLETED | OUTPATIENT
Start: 2021-04-02 | End: 2021-04-02

## 2021-04-02 RX ORDER — FUROSEMIDE 40 MG
40 TABLET ORAL ONCE
Refills: 0 | Status: COMPLETED | OUTPATIENT
Start: 2021-04-02 | End: 2021-04-02

## 2021-04-02 RX ORDER — ALBUTEROL 90 UG/1
2.5 AEROSOL, METERED ORAL ONCE
Refills: 0 | Status: COMPLETED | OUTPATIENT
Start: 2021-04-02 | End: 2021-04-02

## 2021-04-02 RX ADMIN — Medication 1 TABLET(S): at 11:36

## 2021-04-02 RX ADMIN — ENOXAPARIN SODIUM 80 MILLIGRAM(S): 100 INJECTION SUBCUTANEOUS at 17:27

## 2021-04-02 RX ADMIN — Medication 500 MILLIGRAM(S): at 11:36

## 2021-04-02 RX ADMIN — PANTOPRAZOLE SODIUM 40 MILLIGRAM(S): 20 TABLET, DELAYED RELEASE ORAL at 05:43

## 2021-04-02 RX ADMIN — SENNA PLUS 2 TABLET(S): 8.6 TABLET ORAL at 21:37

## 2021-04-02 RX ADMIN — ALBUTEROL 2.5 MILLIGRAM(S): 90 AEROSOL, METERED ORAL at 05:38

## 2021-04-02 RX ADMIN — CHLORHEXIDINE GLUCONATE 1 APPLICATION(S): 213 SOLUTION TOPICAL at 05:43

## 2021-04-02 RX ADMIN — Medication 40 MILLIGRAM(S): at 07:05

## 2021-04-02 RX ADMIN — ENOXAPARIN SODIUM 80 MILLIGRAM(S): 100 INJECTION SUBCUTANEOUS at 05:43

## 2021-04-02 RX ADMIN — POLYETHYLENE GLYCOL 3350 17 GRAM(S): 17 POWDER, FOR SOLUTION ORAL at 11:36

## 2021-04-02 RX ADMIN — MORPHINE SULFATE 15 MILLIGRAM(S): 50 CAPSULE, EXTENDED RELEASE ORAL at 05:42

## 2021-04-02 RX ADMIN — MORPHINE SULFATE 15 MILLIGRAM(S): 50 CAPSULE, EXTENDED RELEASE ORAL at 17:46

## 2021-04-02 RX ADMIN — Medication 2 MILLIGRAM(S): at 11:36

## 2021-04-02 RX ADMIN — Medication 2 MILLIGRAM(S): at 21:37

## 2021-04-02 RX ADMIN — Medication 5 MILLIGRAM(S): at 05:43

## 2021-04-02 RX ADMIN — Medication 5 MILLIGRAM(S): at 22:06

## 2021-04-02 RX ADMIN — MORPHINE SULFATE 15 MILLIGRAM(S): 50 CAPSULE, EXTENDED RELEASE ORAL at 17:27

## 2021-04-02 RX ADMIN — Medication 5 MILLIGRAM(S): at 14:20

## 2021-04-02 NOTE — GOALS OF CARE CONVERSATION - ADVANCED CARE PLANNING - NS PRO AD PATIENT TYPE
DNI/Health Care Proxy (HCP)/Do Not Resuscitate (DNR)/Medical Orders for Life-Sustaining Treatment (MOLST)

## 2021-04-02 NOTE — PROGRESS NOTE ADULT - SUBJECTIVE AND OBJECTIVE BOX
DOV HENLEY 70y Female  MRN#: [id number]   Hospital Day: 10d    SUBJECTIVE  Patient is a 70y old Female who presents with a chief complaint of Fall (01 Apr 2021 12:23)  Currently admitted to medicine with the primary diagnosis of Lung cancer    INTERVAL HPI AND OVERNIGHT EVENTS:  Patient was examined and seen at bedside. This morning she is resting comfortably in bed.  o/n events: Pt became more SOB last night. O2 increased from NC @6LPM-->NRB @15 LPM. Wheezing on exam at time-->nebulizer given x1. Appeared more lethargic as well. CXR ordered STAT (results in rads section below). Given Lasix 40mg IV x1. ABG showed 7.46/49/74/34/95%    REVIEW OF SYMPTOMS:  +L leg pain. +SOB. Decreased mental acuity    OBJECTIVE  PAST MEDICAL & SURGICAL HISTORY  OA (osteoarthritis)    Gout    Diverticulosis    H/O total hysterectomy      ALLERGIES:  moxifloxacin (Anaphylaxis (Moderate))    MEDICATIONS:  STANDING MEDICATIONS  ascorbic acid 500 milliGRAM(s) Oral daily  chlorhexidine 4% Liquid 1 Application(s) Topical <User Schedule>  dronabinol 5 milliGRAM(s) Oral three times a day  enoxaparin Injectable 80 milliGRAM(s) SubCutaneous every 12 hours  morphine ER Tablet 15 milliGRAM(s) Oral every 12 hours  multivitamin 1 Tablet(s) Oral daily  pantoprazole    Tablet 40 milliGRAM(s) Oral before breakfast  polyethylene glycol 3350 17 Gram(s) Oral daily  senna 2 Tablet(s) Oral at bedtime    PRN MEDICATIONS  bisacodyl Suppository 10 milliGRAM(s) Rectal daily PRN  lidocaine   Patch 1 Patch Transdermal daily PRN  morphine  IR 7.5 milliGRAM(s) Oral every 4 hours PRN      VITAL SIGNS: Last 24 Hours  T(C): 36.2 (02 Apr 2021 08:00), Max: 36.2 (02 Apr 2021 08:00)  T(F): 97.1 (02 Apr 2021 08:00), Max: 97.1 (02 Apr 2021 08:00)  HR: 97 (02 Apr 2021 08:00) (85 - 100)  BP: 118/64 (02 Apr 2021 08:00) (111/71 - 128/71)  BP(mean): --  RR: 18 (02 Apr 2021 08:00) (18 - 18)  SpO2: 97% (01 Apr 2021 23:01) (97% - 97%)    LABS:                        8.9    18.16 )-----------( 152      ( 02 Apr 2021 08:21 )             26.5     04-02    128<L>  |  89<L>  |  20  ----------------------------<  102<H>  5.2<H>   |  27  |  0.7    Ca    10.3<H>      02 Apr 2021 08:21  Mg     2.1     04-02    TPro  6.0  /  Alb  2.9<L>  /  TBili  1.9<H>  /  DBili  x   /  AST  64<H>  /  ALT  18  /  AlkPhos  168<H>  04-02        ABG - ( 02 Apr 2021 06:34 )  pH, Arterial: 7.46  pH, Blood: x     /  pCO2: 49    /  pO2: 74    / HCO3: 34    / Base Excess: 9.2   /  SaO2: 95                RADIOLOGY:  < from: Xray Chest 1 View-PORTABLE IMMEDIATE (Xray Chest 1 View-PORTABLE IMMEDIATE .) (04.02.21 @ 07:01) >  INTERPRETATION:  Clinical History / Reason for exam: Shortness of breath    Comparison : Chest radiograph 3/31/2021.    Technique/Positioning: Frontal, portable androtated to the right.    Findings:    Support devices: A catheter seen overlying the right chest    Cardiac/mediastinum/hilum: Previously seen right perihilar mass    Lung parenchyma/Pleura: New right upper lobe opacity, possibly postobstructive atelectasis and/or pneumonia. There is a new small right pleural effusion    Skeleton/soft tissues: Stable    Impression:    New right upper lobe opacity, possibly postobstructive atelectasis or pneumonia    Right perihilar mass.    New small right pleural effusion    < end of copied text >      PHYSICAL EXAM:  CONSTITUTIONAL: No acute distress, ill appearing but comfortable, Lethargic but arousable to voice.  HEAD: Atraumatic, normocephalic  EYES: EOMI  ENT: Supple  PULMONARY: Decreased R lung sounds.  CARDIOVASCULAR: Regular rate and rhythm  GASTROINTESTINAL: Soft, non-tender, non-distended  MUSCULOSKELETAL: Chest wall tenderness to palpation, LL swelling BL R>L  NEUROLOGY: non-focal  SKIN: intact    ASSESSMENT & PLAN  70 years old female from home and lives alone with PMHx of OA of bilateral knees, right rotator ruff tear (?), gout, diverticulosis, presented with difficulty ambulation due to worsening pain on hips and shoulders. 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 Metastatic osteolytic right scapular lesion. Acute mildly displaced left glenoid fracture and age-indeterminate T11 compression deformity, underlying pathologic fractures are not excluded. Left greater than right bilateral adrenal nodularity, likely metastatic.  - s/p thoracentesis 3/15 (drained 1L): +ve for malignant cells (adenocarcinoma)   - NM Bone scan "consistent with metastatic bone disease. Metastatic sites  include right and left ilium, right sacroiliac joint, L2, T8, T11 (photopenic) and the right and left femoral intertrochanteric ridges."  - Pulmonary following--s/p PleurX catheter 3/25, drainage per pulm  - MRI performed--shows diffuse mets including brain with leptomeningeal involvement  - f/u Rad/Onc as outpt 3-4 wks  - Heme/Onc following  > The patient and her family are still awaiting the results of NGS to see if she has any actionable mutations, if these are negative then we will recommend hospice. If she does have a mutation we can offer treatment. In the interim, recommend aggressive pain management and supportive care  > Marinol 5mg BID added for appetite  - Pain regimen per palliative team  - Increasing O2 requirements o/n--6L NC-->NRB @15  CXR in AM shows R. (small) pleural effusion-->850cc drained from PleurX today    # Nondisplaced femoral neck subcapital impaction fracture s/p corrective surgery at [facility]  - Pain control with Tylenol   - CT Pelvis Bony Only No Cont (03.14.21 @ 22:01): Multiple infiltrative bone lesions, largest at the right iliac crest with nondisplaced pathologic fracture and soft tissue mass extension. Findings are compatible with osseous metastasis. Consider nuclear medicine bone scan for the assessment of skeletal metastasis. Nondisplaced right subcapital femoral neck impaction fracture. Consider MRI of the right hip to evaluate extent of disease.  - Ortho following--appreciate recs    #L Gastrocnemius DVT  - Pt had c/o leg swelling on Saturday-->va duplex ordered  - Duplex 3/28 +ve for DVT of the left gastrocnemius vein  - c/w therapeutic lovenox (Lung adeno has low risk of bleeding)    #Hyponatremia--possibly due to malignancy  - fluid restriction 1 L  - follow up BMP     # Mild hypercalcemia - resolved   - Ca 10.4, albumin 4.4  - Likely secondary to dehydration (patient reports not eating or drinking much because it was difficult for her to get around)    # Gout  - Not on any medication  - Will avoid red meat diet while inpatient    DVT ppx: Therapeutic lovenox  GI ppx: Protonix  Diet: Regular  Ambulatory status: as tolerated,   Code status: DNR/I    PAST MEDICAL & SURGICAL HISTORY:  OA (osteoarthritis)    Gout    Diverticulosis    H/O total hysterectomy

## 2021-04-02 NOTE — GOALS OF CARE CONVERSATION - ADVANCED CARE PLANNING - CONVERSATION DETAILS
Palliative NP and MD spoke to pt's sisters Carmita and Yaritza    Reviewed current illness, and hospital  course. All questions answered. They have gotten updates from heme/onc and medicine but palliative discussed overall very poor prognosis due to how advanced her cancer is. Also discussed her symptoms and how her quality of life is significantly impacted.     Encouraged her sisters to visit patient and discuss overall how she feels about continuing tx versus a comfort based approach knowing her life expectancy is quite limited based on her poor respiratory and functional status.    They verbalized understanding, will meet with palliative team on Monday at bedside but are prepared if she declines over the weekend

## 2021-04-02 NOTE — PROGRESS NOTE ADULT - ASSESSMENT
70yFemale being evaluated for goals of care and symptom management. Pt has stage 4 lung cancer, with bone, liver and leptomeningeal spread. Pt seen for follow up of symptom management and goals of care. Palliative NP and MD saw pt who is more breathless. Asked resident to call pulmonary to potentially drain more from plurex cath. Also resident sited outher opacities on xray that show disease is progressing.    Palliative NP and MS spoke to sisters, see [facility] note. they will visit this weekend.      MEDD (morphine equivalent daily dose): 30mg/24hrs      See Recs below.  - DNR/DNI  -MS Contin 15mg q 12 hrs  -MSIR 7.5mg po Q 4 hrs PRN for pain or dyspnea  - Palliative care meeting w sisters and pt on monday 4/5/21, palliative number supplied to sisters to call as needed    Please call [phone] with questions or concerns 24/7.   We will continue to follow.

## 2021-04-02 NOTE — CHART NOTE - NSCHARTNOTEFT_GEN_A_CORE
Registered Dietitian Follow-Up     Patient Profile Reviewed                           Yes [x]   No []     Nutrition History Previously Obtained        Yes [x]  No []       Pertinent Subjective Information: Per RN pt eats when encouraged. Required non-rebreather this morning. Was able to come off to eat some breakfast but was given pancakes and started to cough. May be d/t dry mouth/throat as no prior issues chewing/swallowing per RN. Supplements seen unopened at pt's bedside.      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 63" (per pt)   - Wt.  189.5lbs (3/25); dosing   173lbs (4/2) -- wt taken by RD at time of assessment   - %wt change  - BMI 33.6 -- based on dosing   - IBW 115lbs     Pertinent Lab Data: (4/2) H/H 8.9/26.5, Na 131, Cr 0.5     Pertinent Meds: lovenox, decadron, marinol, dulcolax, vit C, Multivitamin, protonix, miralax, senna      Physical Findings:  - Appearance: sleeping at time of assessment. +1 R+L leg, +2 R arm edema noted per RN flow sheets  - GI function: LBM 3/31, big BM per RN. Now no c/o N+V, constipation or diarrhea   - Tubes: non-rebreather   - Oral/Mouth cavity: no prior issues, coughed after taking off mask and eating pancakes  - Skin: WDL      Nutrition Requirements  Weight Used: 52kg IBW -- continued from initial RD assessment (3/30)     kcal: 1560-1820kcal (30-35kcal/kg IBW) -- ?accuracy of CBW, CA considered   protein: 62-73g (1.2-1.4g/kg IBW) -- as mentioned   fluids: 1ml/kcal or per LIP     Nutrient Intake: ~50% per RN flow sheets, today <50% for breakfast d/t increased oxygen demand         [x] Previous Nutrition Diagnosis: 1. inadequate protein energy intake 2. Increased Nutrient Needs            [x] Ongoing          [] Resolved       Nutrition Intervention: meals and snacks, medical food supplements, coordination of care      Goal/Expected Outcome: Pt to meet greater than 75% of estimated needs within 4 days      Indicator/Monitoring: energy intake, body composition, NFPF, electrolyte profile     Recommendations:   Continue current diet order as tolerated. Monitor for any chewing/swallowing difficulties, if continues consider consulting s/s   Continue providing oral nutrition supplements   Continue Multivitamin and vit C supplementation

## 2021-04-02 NOTE — CHART NOTE - NSCHARTNOTEFT_GEN_A_CORE
Spoke with patient's sister Carmita at [phone] to provide medical update. All questions and concerns addressed

## 2021-04-02 NOTE — PROGRESS NOTE ADULT - SUBJECTIVE AND OBJECTIVE BOX
DOV HENLEY             MRN-[id number]    CC: right hip pain/weakness    HPI: Pt newly diagnosed w metastatic lung caner, mets, bone, liver, and leptomeningeal spread. S/P right hip arthroplasty at [facility], right plurex drain. Overall debility      SUBJECTIVE:    ROS:  DYSPNEA: YES   NAUS/VOM: NO	  SECRETIONS: NO	  AGITATION: NO  Pain (Y/N):     YES, back and right hip   -Provocation/Palliation: movement   -Quality/Quantity: sharp stabbing   -Radiating: yes hip and back and chest   -Severity: pain is severe   -Timing/Frequency: constant  -Impact on ADLs: unable to ambulate      PEx:  70y    General: AAOx3 , lying in bed  , fatigued and weak   HEENT:  NCAT PERRL EOMI Non icteric MOM  Neck: Supple no masses  CVS: RR S1S2 edema general   Resp: Labored breathing, use of accessory muscles, dyspnea at rest while talking   GI:  Soft NT ND BS+  :   [place]  Musc: No C/C/E    Neuro: Follows commands No focal deficits  Psych: anxious affect   Skin: Non jaundiced   Lymph: Normal                    Last BM:   03-31-21 @ 07:01  -  04-01-21 @ 07:00  --------------------------------------------------------  OUT: 0 mL    04-01-21 @ 07:01  -  04-02-21 @ 07:00  --------------------------------------------------------  OUT: 0 mL        ALLERGIES:     OPIATE NAÏVE (Y/N):    MEDICATIONS: REVIEWED  MEDICATIONS  (STANDING):  ascorbic acid 500 milliGRAM(s) Oral daily  chlorhexidine 4% Liquid 1 Application(s) Topical <User Schedule>  dronabinol 5 milliGRAM(s) Oral three times a day  enoxaparin Injectable 80 milliGRAM(s) SubCutaneous every 12 hours  morphine ER Tablet 15 milliGRAM(s) Oral every 12 hours  multivitamin 1 Tablet(s) Oral daily  pantoprazole    Tablet 40 milliGRAM(s) Oral before breakfast  polyethylene glycol 3350 17 Gram(s) Oral daily  senna 2 Tablet(s) Oral at bedtime    MEDICATIONS  (PRN):  bisacodyl Suppository 10 milliGRAM(s) Rectal daily PRN Constipation  lidocaine   Patch 1 Patch Transdermal daily PRN Back pain  morphine  IR 7.5 milliGRAM(s) Oral every 4 hours PRN mild to severe pain      LABS: REVIEWED  CBC:                        8.9    18.16 )-----------( 152      ( 02 Apr 2021 08:21 )             26.5     CMP:    04-02    128<L>  |  89<L>  |  20  ----------------------------<  102<H>  5.2<H>   |  27  |  0.7    Ca    10.3<H>      02 Apr 2021 08:21  Mg     2.1     04-02    TPro  6.0  /  Alb  2.9<L>  /  TBili  1.9<H>  /  DBili  x   /  AST  64<H>  /  ALT  18  /  AlkPhos  168<H>  04-02  Albumin, Serum: 2.9 g/dL (04-02-21 @ 08:21)      IMAGING: REVIEWED    ADVANCED DIRECTIVES:       DNR/DNI    DECISION MAKER: patient has been making decisions, condition is declining  LEGAL SURROGATE: Spoke to pt's HCPs, Lesvia at pt's request    PSYCHOSOCIAL-SPIRITUAL ASSESSMENT:       Reviewed       Care plan unchanged       Care plan adjusted as above	    GOALS OF CARE DISCUSSION       Palliative care info/counseling provided	           Family meeting - for monday 4/5/21       Advanced Directives addressed please see Advance Care Planning Note	           See previous Palliative Medicine Note       Documentation of GOC: 4/2/21    CURRENT DISPO PLAN:     WILL REMAIN IN HOSPITAL IS ACUTE       REFERRALS	        Palliative Med        Unit SW/Case Mgmt              Speech/Swallow       Patient/Family Support      Hospice       Nutrition       Dietician       PT/OT DOV HENLEY             MRN-[id number]    CC: right hip pain/weakness    HPI: Pt newly diagnosed w metastatic lung caner, mets, bone, liver, and leptomeningeal spread. S/P right hip arthroplasty at [facility], right plurex drain. Overall debility      SUBJECTIVE: Patient seen and examined at bedside. She is very weak and fatigued. Patient states she is having pain and itching. She denies nausea, vomiting, constipation. She wanted us to call her sisters and speak to them.  GOC as below.     ROS:  DYSPNEA: YES   NAUS/VOM: NO	  SECRETIONS: NO	  AGITATION: NO  Pain (Y/N):     YES, back and right hip   -Provocation/Palliation: movement   -Quality/Quantity: sharp stabbing   -Radiating: yes hip and back and chest   -Severity: pain is severe   -Timing/Frequency: constant  -Impact on ADLs: unable to ambulate      PEx:  70y    General: AAOx3 , lying in bed  , fatigued and weak   HEENT:  NCAT PERRL EOMI Non icteric MOM  Neck: Supple no masses  CVS: RR S1S2 edema general   Resp: Labored breathing, use of accessory muscles, dyspnea at rest while talking   GI:  Soft NT ND BS+  :   [place]  Musc: No C/C/E    Neuro: Follows commands No focal deficits  Psych: anxious affect   Skin: Non jaundiced   Lymph: Normal                    Last BM:   03-31-21 @ 07:01  -  04-01-21 @ 07:00  --------------------------------------------------------  OUT: 0 mL    04-01-21 @ 07:01  -  04-02-21 @ 07:00  --------------------------------------------------------  OUT: 0 mL        ALLERGIES:     OPIATE NAÏVE (Y/N):    MEDICATIONS: REVIEWED  MEDICATIONS  (STANDING):  ascorbic acid 500 milliGRAM(s) Oral daily  chlorhexidine 4% Liquid 1 Application(s) Topical <User Schedule>  dronabinol 5 milliGRAM(s) Oral three times a day  enoxaparin Injectable 80 milliGRAM(s) SubCutaneous every 12 hours  morphine ER Tablet 15 milliGRAM(s) Oral every 12 hours  multivitamin 1 Tablet(s) Oral daily  pantoprazole    Tablet 40 milliGRAM(s) Oral before breakfast  polyethylene glycol 3350 17 Gram(s) Oral daily  senna 2 Tablet(s) Oral at bedtime    MEDICATIONS  (PRN):  bisacodyl Suppository 10 milliGRAM(s) Rectal daily PRN Constipation  lidocaine   Patch 1 Patch Transdermal daily PRN Back pain  morphine  IR 7.5 milliGRAM(s) Oral every 4 hours PRN mild to severe pain      LABS: REVIEWED  CBC:                        8.9    18.16 )-----------( 152      ( 02 Apr 2021 08:21 )             26.5     CMP:    04-02    128<L>  |  89<L>  |  20  ----------------------------<  102<H>  5.2<H>   |  27  |  0.7    Ca    10.3<H>      02 Apr 2021 08:21  Mg     2.1     04-02    TPro  6.0  /  Alb  2.9<L>  /  TBili  1.9<H>  /  DBili  x   /  AST  64<H>  /  ALT  18  /  AlkPhos  168<H>  04-02  Albumin, Serum: 2.9 g/dL (04-02-21 @ 08:21)      IMAGING: REVIEWED    EKG reviewed.    ADVANCED DIRECTIVES:       DNR/DNI    DECISION MAKER: patient has been making decisions, condition is declining  LEGAL SURROGATE: Spoke to pt's HCPs, Lesvia at pt's request    PSYCHOSOCIAL-SPIRITUAL ASSESSMENT:       Reviewed       Care plan unchanged       Care plan adjusted as above	    GOALS OF CARE DISCUSSION       Palliative care info/counseling provided	           Family meeting - for monday 4/5/21       Advanced Directives addressed please see Advance Care Planning Note	           See previous Palliative Medicine Note       Documentation of GOC: 4/2/21    CURRENT DISPO PLAN:     WILL REMAIN IN HOSPITAL IS ACUTE     REFERRALS	        Palliative Med        Unit SW/Case Mgmt              Speech/Swallow       Patient/Family Support      Hospice       Nutrition       Dietician       PT/OT

## 2021-04-03 LAB
ALBUMIN SERPL ELPH-MCNC: 2.8 G/DL — LOW (ref 3.5–5.2)
ALP SERPL-CCNC: 169 U/L — HIGH (ref 30–115)
ALT FLD-CCNC: 16 U/L — SIGNIFICANT CHANGE UP (ref 0–41)
ANION GAP SERPL CALC-SCNC: 12 MMOL/L — SIGNIFICANT CHANGE UP (ref 7–14)
AST SERPL-CCNC: 44 U/L — HIGH (ref 0–41)
BASOPHILS # BLD AUTO: 0.08 K/UL — SIGNIFICANT CHANGE UP (ref 0–0.2)
BASOPHILS NFR BLD AUTO: 0.4 % — SIGNIFICANT CHANGE UP (ref 0–1)
BILIRUB SERPL-MCNC: 1.6 MG/DL — HIGH (ref 0.2–1.2)
BUN SERPL-MCNC: 22 MG/DL — HIGH (ref 10–20)
CALCIUM SERPL-MCNC: 10.4 MG/DL — HIGH (ref 8.5–10.1)
CHLORIDE SERPL-SCNC: 89 MMOL/L — LOW (ref 98–110)
CO2 SERPL-SCNC: 29 MMOL/L — SIGNIFICANT CHANGE UP (ref 17–32)
CREAT SERPL-MCNC: 0.6 MG/DL — LOW (ref 0.7–1.5)
EOSINOPHIL # BLD AUTO: 0.05 K/UL — SIGNIFICANT CHANGE UP (ref 0–0.7)
EOSINOPHIL NFR BLD AUTO: 0.3 % — SIGNIFICANT CHANGE UP (ref 0–8)
GLUCOSE SERPL-MCNC: 104 MG/DL — HIGH (ref 70–99)
HCT VFR BLD CALC: 25.3 % — LOW (ref 37–47)
HGB BLD-MCNC: 8.3 G/DL — LOW (ref 12–16)
IMM GRANULOCYTES NFR BLD AUTO: 5 % — HIGH (ref 0.1–0.3)
LYMPHOCYTES # BLD AUTO: 1.6 K/UL — SIGNIFICANT CHANGE UP (ref 1.2–3.4)
LYMPHOCYTES # BLD AUTO: 8.8 % — LOW (ref 20.5–51.1)
MAGNESIUM SERPL-MCNC: 2.1 MG/DL — SIGNIFICANT CHANGE UP (ref 1.8–2.4)
MCHC RBC-ENTMCNC: 32.8 G/DL — SIGNIFICANT CHANGE UP (ref 32–37)
MCHC RBC-ENTMCNC: 33.2 PG — HIGH (ref 27–31)
MCV RBC AUTO: 101.2 FL — HIGH (ref 81–99)
MONOCYTES # BLD AUTO: 0.64 K/UL — HIGH (ref 0.1–0.6)
MONOCYTES NFR BLD AUTO: 3.5 % — SIGNIFICANT CHANGE UP (ref 1.7–9.3)
NEUTROPHILS # BLD AUTO: 14.9 K/UL — HIGH (ref 1.4–6.5)
NEUTROPHILS NFR BLD AUTO: 82 % — HIGH (ref 42.2–75.2)
NRBC # BLD: 0 /100 WBCS — SIGNIFICANT CHANGE UP (ref 0–0)
PLATELET # BLD AUTO: 196 K/UL — SIGNIFICANT CHANGE UP (ref 130–400)
POTASSIUM SERPL-MCNC: 5.1 MMOL/L — HIGH (ref 3.5–5)
POTASSIUM SERPL-SCNC: 5.1 MMOL/L — HIGH (ref 3.5–5)
PROT SERPL-MCNC: 5.9 G/DL — LOW (ref 6–8)
RBC # BLD: 2.5 M/UL — LOW (ref 4.2–5.4)
RBC # FLD: 21.9 % — HIGH (ref 11.5–14.5)
SODIUM SERPL-SCNC: 130 MMOL/L — LOW (ref 135–146)
WBC # BLD: 18.18 K/UL — HIGH (ref 4.8–10.8)
WBC # FLD AUTO: 18.18 K/UL — HIGH (ref 4.8–10.8)

## 2021-04-03 PROCEDURE — 71045 X-RAY EXAM CHEST 1 VIEW: CPT | Mod: 26

## 2021-04-03 PROCEDURE — 99233 SBSQ HOSP IP/OBS HIGH 50: CPT

## 2021-04-03 RX ADMIN — Medication 500 MILLIGRAM(S): at 11:09

## 2021-04-03 RX ADMIN — MORPHINE SULFATE 15 MILLIGRAM(S): 50 CAPSULE, EXTENDED RELEASE ORAL at 18:29

## 2021-04-03 RX ADMIN — Medication 2 MILLIGRAM(S): at 13:44

## 2021-04-03 RX ADMIN — PANTOPRAZOLE SODIUM 40 MILLIGRAM(S): 20 TABLET, DELAYED RELEASE ORAL at 05:05

## 2021-04-03 RX ADMIN — ENOXAPARIN SODIUM 80 MILLIGRAM(S): 100 INJECTION SUBCUTANEOUS at 18:30

## 2021-04-03 RX ADMIN — Medication 5 MILLIGRAM(S): at 22:47

## 2021-04-03 RX ADMIN — SENNA PLUS 2 TABLET(S): 8.6 TABLET ORAL at 21:59

## 2021-04-03 RX ADMIN — MORPHINE SULFATE 15 MILLIGRAM(S): 50 CAPSULE, EXTENDED RELEASE ORAL at 19:46

## 2021-04-03 RX ADMIN — Medication 5 MILLIGRAM(S): at 05:06

## 2021-04-03 RX ADMIN — MORPHINE SULFATE 7.5 MILLIGRAM(S): 50 CAPSULE, EXTENDED RELEASE ORAL at 09:30

## 2021-04-03 RX ADMIN — ENOXAPARIN SODIUM 80 MILLIGRAM(S): 100 INJECTION SUBCUTANEOUS at 05:05

## 2021-04-03 RX ADMIN — MORPHINE SULFATE 7.5 MILLIGRAM(S): 50 CAPSULE, EXTENDED RELEASE ORAL at 10:00

## 2021-04-03 RX ADMIN — Medication 2 MILLIGRAM(S): at 05:05

## 2021-04-03 RX ADMIN — Medication 5 MILLIGRAM(S): at 13:08

## 2021-04-03 RX ADMIN — Medication 1 TABLET(S): at 11:09

## 2021-04-03 RX ADMIN — MORPHINE SULFATE 15 MILLIGRAM(S): 50 CAPSULE, EXTENDED RELEASE ORAL at 05:06

## 2021-04-03 RX ADMIN — Medication 2 MILLIGRAM(S): at 21:59

## 2021-04-03 RX ADMIN — MORPHINE SULFATE 15 MILLIGRAM(S): 50 CAPSULE, EXTENDED RELEASE ORAL at 05:05

## 2021-04-03 RX ADMIN — POLYETHYLENE GLYCOL 3350 17 GRAM(S): 17 POWDER, FOR SOLUTION ORAL at 11:09

## 2021-04-03 NOTE — PROGRESS NOTE ADULT - SUBJECTIVE AND OBJECTIVE BOX
DOV HENLEY 70y Female  MRN#: [id number]   Hospital Day: 11d    SUBJECTIVE  Patient is a 70y old Female who presents with a chief complaint of right hip arthroplasty (02 Apr 2021 13:32)  Currently admitted to medicine with the primary diagnosis of   INTERVAL HPI AND OVERNIGHT EVENTS:  Patient was examined and seen at bedside. This morning she is resting comfortably in bed.    REVIEW OF SYMPTOMS:  +back discomfort. +fatigue. +decreased mental acuity    OBJECTIVE  PAST MEDICAL & SURGICAL HISTORY  OA (osteoarthritis)    Gout    Diverticulosis    H/O total hysterectomy      ALLERGIES:  moxifloxacin (Anaphylaxis (Moderate))    MEDICATIONS:  STANDING MEDICATIONS  ascorbic acid 500 milliGRAM(s) Oral daily  chlorhexidine 4% Liquid 1 Application(s) Topical <User Schedule>  dexAMETHasone     Tablet 2 milliGRAM(s) Oral three times a day  dronabinol 5 milliGRAM(s) Oral three times a day  enoxaparin Injectable 80 milliGRAM(s) SubCutaneous every 12 hours  morphine ER Tablet 15 milliGRAM(s) Oral every 12 hours  multivitamin 1 Tablet(s) Oral daily  pantoprazole    Tablet 40 milliGRAM(s) Oral before breakfast  polyethylene glycol 3350 17 Gram(s) Oral daily  senna 2 Tablet(s) Oral at bedtime    PRN MEDICATIONS  bisacodyl Suppository 10 milliGRAM(s) Rectal daily PRN  lidocaine   Patch 1 Patch Transdermal daily PRN  morphine  IR 7.5 milliGRAM(s) Oral every 4 hours PRN      VITAL SIGNS: Last 24 Hours  T(C): 36.1 (03 Apr 2021 00:00), Max: 36.2 (02 Apr 2021 15:30)  T(F): 96.9 (03 Apr 2021 00:00), Max: 97.2 (02 Apr 2021 15:30)  HR: 95 (03 Apr 2021 07:00) (90 - 95)  BP: 130/73 (03 Apr 2021 07:00) (99/56 - 130/73)  BP(mean): --  RR: 20 (03 Apr 2021 07:00) (17 - 20)  SpO2: 100% (03 Apr 2021 07:00) (98% - 100%)    LABS:                        8.3    18.18 )-----------( 196      ( 03 Apr 2021 06:34 )             25.3     04-03    130<L>  |  89<L>  |  22<H>  ----------------------------<  104<H>  5.1<H>   |  29  |  0.6<L>    Ca    10.4<H>      03 Apr 2021 06:34  Mg     2.1     04-03    TPro  5.9<L>  /  Alb  2.8<L>  /  TBili  1.6<H>  /  DBili  x   /  AST  44<H>  /  ALT  16  /  AlkPhos  169<H>  04-03        ABG - ( 02 Apr 2021 06:34 )  pH, Arterial: 7.46  pH, Blood: x     /  pCO2: 49    /  pO2: 74    / HCO3: 34    / Base Excess: 9.2   /  SaO2: 95        RADIOLOGY:      PHYSICAL EXAM:  CONSTITUTIONAL: No acute distress, ill appearing but comfortable, Lethargic but arousable to voice.  HEAD: Atraumatic, normocephalic  EYES: EOMI  ENT: Supple  PULMONARY: Diffusely decreased breath sounds  CARDIOVASCULAR: Regular rate and rhythm  GASTROINTESTINAL: Soft, non-tender, non-distended  MUSCULOSKELETAL: LL swelling BL  NEUROLOGY: non-focal  SKIN: intact    ASSESSMENT & PLAN  70 years old female from home and lives alone with PMHx of OA of bilateral knees, right rotator ruff tear (?), gout, diverticulosis, presented with difficulty ambulation due to worsening pain on hips and shoulders. 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 Metastatic osteolytic right scapular lesion. Acute mildly displaced left glenoid fracture and age-indeterminate T11 compression deformity, underlying pathologic fractures are not excluded. Left greater than right bilateral adrenal nodularity, likely metastatic.  - s/p thoracentesis 3/15 (drained 1L): +ve for malignant cells (adenocarcinoma)   - NM Bone scan "consistent with metastatic bone disease. Metastatic sites  include right and left ilium, right sacroiliac joint, L2, T8, T11 (photopenic) and the right and left femoral intertrochanteric ridges."  - Pulmonary following--s/p PleurX catheter 3/25, drainage per pulm-->850 cc drained yesterday  - MRI performed--shows diffuse mets including brain with leptomeningeal involvement-->likely culprit of confusion  - f/u Rad/Onc as outpt 3-4 wks  - Heme/Onc following  > The patient and her family are still awaiting the results of NGS to see if she has any actionable mutations, if these are negative then we will recommend hospice. If she does have a mutation we can offer treatment. In the interim, recommend aggressive pain management and supportive care  > Marinol 5mg BID added for appetite  > Decadron 2mg for cerebral edema  - Pain regimen per palliative team  - Family mtg planned bedside with palliative to discuss possible comfort/hospice care    # Nondisplaced femoral neck subcapital impaction fracture s/p corrective surgery at [facility]  - Pain control with Tylenol   - CT Pelvis Bony Only No Cont (03.14.21 @ 22:01): Multiple infiltrative bone lesions, largest at the right iliac crest with nondisplaced pathologic fracture and soft tissue mass extension. Findings are compatible with osseous metastasis. Consider nuclear medicine bone scan for the assessment of skeletal metastasis. Nondisplaced right subcapital femoral neck impaction fracture. Consider MRI of the right hip to evaluate extent of disease.  - Ortho following--appreciate recs    #L Gastrocnemius DVT  - Pt had c/o leg swelling on Saturday-->va duplex ordered  - Duplex 3/28 +ve for DVT of the left gastrocnemius vein  - c/w therapeutic lovenox (Lung adeno has low risk of bleeding)    #Hyponatremia--possibly due to malignancy  - fluid restriction 1 L  - follow up BMP     # Mild hypercalcemia - resolved   - Ca 10.4, albumin 4.4  - Likely secondary to dehydration (patient reports not eating or drinking much because it was difficult for her to get around)    # Gout  - Not on any medication  - Will avoid red meat diet while inpatient    DVT ppx: Therapeutic lovenox  GI ppx: Protonix  Diet: Regular  Ambulatory status: as tolerated,   Code status: DNR/I    PAST MEDICAL & SURGICAL HISTORY:  OA (osteoarthritis)    Gout    Diverticulosis    H/O total hysterectomy

## 2021-04-04 LAB
ALBUMIN SERPL ELPH-MCNC: 2.8 G/DL — LOW (ref 3.5–5.2)
ALP SERPL-CCNC: 183 U/L — HIGH (ref 30–115)
ALT FLD-CCNC: 15 U/L — SIGNIFICANT CHANGE UP (ref 0–41)
ANION GAP SERPL CALC-SCNC: 12 MMOL/L — SIGNIFICANT CHANGE UP (ref 7–14)
AST SERPL-CCNC: 44 U/L — HIGH (ref 0–41)
BASOPHILS # BLD AUTO: 0.08 K/UL — SIGNIFICANT CHANGE UP (ref 0–0.2)
BASOPHILS NFR BLD AUTO: 0.4 % — SIGNIFICANT CHANGE UP (ref 0–1)
BILIRUB SERPL-MCNC: 1.3 MG/DL — HIGH (ref 0.2–1.2)
BUN SERPL-MCNC: 25 MG/DL — HIGH (ref 10–20)
CALCIUM SERPL-MCNC: 9.5 MG/DL — SIGNIFICANT CHANGE UP (ref 8.5–10.1)
CHLORIDE SERPL-SCNC: 87 MMOL/L — LOW (ref 98–110)
CO2 SERPL-SCNC: 28 MMOL/L — SIGNIFICANT CHANGE UP (ref 17–32)
CREAT SERPL-MCNC: 0.6 MG/DL — LOW (ref 0.7–1.5)
EOSINOPHIL # BLD AUTO: 0.01 K/UL — SIGNIFICANT CHANGE UP (ref 0–0.7)
EOSINOPHIL NFR BLD AUTO: 0.1 % — SIGNIFICANT CHANGE UP (ref 0–8)
GLUCOSE SERPL-MCNC: 101 MG/DL — HIGH (ref 70–99)
HCT VFR BLD CALC: 23.5 % — LOW (ref 37–47)
HGB BLD-MCNC: 7.9 G/DL — LOW (ref 12–16)
IMM GRANULOCYTES NFR BLD AUTO: 5.4 % — HIGH (ref 0.1–0.3)
LYMPHOCYTES # BLD AUTO: 1.53 K/UL — SIGNIFICANT CHANGE UP (ref 1.2–3.4)
LYMPHOCYTES # BLD AUTO: 8.6 % — LOW (ref 20.5–51.1)
MAGNESIUM SERPL-MCNC: 2.1 MG/DL — SIGNIFICANT CHANGE UP (ref 1.8–2.4)
MCHC RBC-ENTMCNC: 33.6 G/DL — SIGNIFICANT CHANGE UP (ref 32–37)
MCHC RBC-ENTMCNC: 33.6 PG — HIGH (ref 27–31)
MCV RBC AUTO: 100 FL — HIGH (ref 81–99)
MONOCYTES # BLD AUTO: 0.88 K/UL — HIGH (ref 0.1–0.6)
MONOCYTES NFR BLD AUTO: 4.9 % — SIGNIFICANT CHANGE UP (ref 1.7–9.3)
NEUTROPHILS # BLD AUTO: 14.38 K/UL — HIGH (ref 1.4–6.5)
NEUTROPHILS NFR BLD AUTO: 80.6 % — HIGH (ref 42.2–75.2)
NRBC # BLD: 0 /100 WBCS — SIGNIFICANT CHANGE UP (ref 0–0)
PLATELET # BLD AUTO: 150 K/UL — SIGNIFICANT CHANGE UP (ref 130–400)
POTASSIUM SERPL-MCNC: 5.1 MMOL/L — HIGH (ref 3.5–5)
POTASSIUM SERPL-SCNC: 5.1 MMOL/L — HIGH (ref 3.5–5)
PROCALCITONIN SERPL-MCNC: 0.19 NG/ML — HIGH (ref 0.02–0.1)
PROT SERPL-MCNC: 5.7 G/DL — LOW (ref 6–8)
RBC # BLD: 2.35 M/UL — LOW (ref 4.2–5.4)
RBC # FLD: 22 % — HIGH (ref 11.5–14.5)
SODIUM SERPL-SCNC: 127 MMOL/L — LOW (ref 135–146)
WBC # BLD: 17.84 K/UL — HIGH (ref 4.8–10.8)
WBC # FLD AUTO: 17.84 K/UL — HIGH (ref 4.8–10.8)

## 2021-04-04 PROCEDURE — 99233 SBSQ HOSP IP/OBS HIGH 50: CPT

## 2021-04-04 RX ADMIN — Medication 5 MILLIGRAM(S): at 14:00

## 2021-04-04 RX ADMIN — MORPHINE SULFATE 15 MILLIGRAM(S): 50 CAPSULE, EXTENDED RELEASE ORAL at 06:11

## 2021-04-04 RX ADMIN — Medication 2 MILLIGRAM(S): at 13:17

## 2021-04-04 RX ADMIN — Medication 1 TABLET(S): at 12:16

## 2021-04-04 RX ADMIN — Medication 5 MILLIGRAM(S): at 06:11

## 2021-04-04 RX ADMIN — POLYETHYLENE GLYCOL 3350 17 GRAM(S): 17 POWDER, FOR SOLUTION ORAL at 12:16

## 2021-04-04 RX ADMIN — Medication 5 MILLIGRAM(S): at 21:04

## 2021-04-04 RX ADMIN — MORPHINE SULFATE 15 MILLIGRAM(S): 50 CAPSULE, EXTENDED RELEASE ORAL at 17:47

## 2021-04-04 RX ADMIN — CHLORHEXIDINE GLUCONATE 1 APPLICATION(S): 213 SOLUTION TOPICAL at 06:11

## 2021-04-04 RX ADMIN — Medication 2 MILLIGRAM(S): at 21:04

## 2021-04-04 RX ADMIN — Medication 2 MILLIGRAM(S): at 06:12

## 2021-04-04 RX ADMIN — PANTOPRAZOLE SODIUM 40 MILLIGRAM(S): 20 TABLET, DELAYED RELEASE ORAL at 06:13

## 2021-04-04 RX ADMIN — ENOXAPARIN SODIUM 80 MILLIGRAM(S): 100 INJECTION SUBCUTANEOUS at 17:32

## 2021-04-04 RX ADMIN — ENOXAPARIN SODIUM 80 MILLIGRAM(S): 100 INJECTION SUBCUTANEOUS at 06:13

## 2021-04-04 RX ADMIN — SENNA PLUS 2 TABLET(S): 8.6 TABLET ORAL at 21:04

## 2021-04-04 RX ADMIN — Medication 500 MILLIGRAM(S): at 12:16

## 2021-04-04 NOTE — PROGRESS NOTE ADULT - ASSESSMENT
70 yr old female presented with SOB.  # Adeno Ca of lung--rt perihilar mass and lt pleural effusion s/p pleurX cath-- pulmonary drained 850 cc 4/2/21.  diffuse mets with leptomeningeal involvement.  Rt subcapital femoral neck fracture s/p surgery at [facility]-- Dr Lord-- orthopedic oncologist  -  Oncology has suggested--on 2mg decadron for cerebral edema until molecular studies return.   patient is DNR and DNI  # hyponatremia--  Na today is 128--likley  due to SIADH form adeno Ca of lung  # Hypercalcemia-on decadron.  #macrocytic anemia-- worsening-- no acute blood loss and does not need transfusion currently.  # leucocytosis due to steroids.   very poor prognosis-- pending chemo after molecular studies.   hospice discussion with family after options of treatment will be laid down by oncology-- who are currently waiting for molecular studies.    Patient is alert and talking -- answers questions appropriately.

## 2021-04-04 NOTE — PROGRESS NOTE ADULT - SUBJECTIVE AND OBJECTIVE BOX
SUBJECTIVE:    Patient is a 70y old Female who presents with a chief complaint of Lung Cancer (03 Apr 2021 11:26)    Currently admitted to medicine with the primary diagnosis of    Today is hospital day 12d.     PAST MEDICAL & SURGICAL HISTORY  OA (osteoarthritis)    Gout    Diverticulosis    H/O total hysterectomy      ALLERGIES:  moxifloxacin (Anaphylaxis (Moderate))    MEDICATIONS:  STANDING MEDICATIONS  ascorbic acid 500 milliGRAM(s) Oral daily  chlorhexidine 4% Liquid 1 Application(s) Topical <User Schedule>  dexAMETHasone     Tablet 2 milliGRAM(s) Oral three times a day  dronabinol 5 milliGRAM(s) Oral three times a day  enoxaparin Injectable 80 milliGRAM(s) SubCutaneous every 12 hours  morphine ER Tablet 15 milliGRAM(s) Oral every 12 hours  multivitamin 1 Tablet(s) Oral daily  pantoprazole    Tablet 40 milliGRAM(s) Oral before breakfast  polyethylene glycol 3350 17 Gram(s) Oral daily  senna 2 Tablet(s) Oral at bedtime    PRN MEDICATIONS  bisacodyl Suppository 10 milliGRAM(s) Rectal daily PRN  lidocaine   Patch 1 Patch Transdermal daily PRN  morphine  IR 7.5 milliGRAM(s) Oral every 4 hours PRN    VITALS:   T(F): 95.6  HR: 96  BP: 129/65  RR: 18  SpO2: 100%    LABS:                        7.9    17.84 )-----------( 150      ( 04 Apr 2021 05:59 )             23.5     04-04    127<L>  |  87<L>  |  25<H>  ----------------------------<  101<H>  5.1<H>   |  28  |  0.6<L>    Ca    9.5      04 Apr 2021 05:59  Mg     2.1     04-04    TPro  5.7<L>  /  Alb  2.8<L>  /  TBili  1.3<H>  /  DBili  x   /  AST  44<H>  /  ALT  15  /  AlkPhos  183<H>  04-04                  RADIOLOGY:    PHYSICAL EXAM:  GEN: No acute distress  LUNGS: Clear to auscultation bilaterally   HEART: S1/S2 present. RRR.   ABD/ GI: Soft, non-tender, non-distended. Bowel sounds present  EXT: NC/NC/NE/2+PP/FUENTES  NEURO: AAOX3

## 2021-04-05 VITALS
SYSTOLIC BLOOD PRESSURE: 109 MMHG | HEART RATE: 101 BPM | RESPIRATION RATE: 19 BRPM | TEMPERATURE: 97 F | DIASTOLIC BLOOD PRESSURE: 59 MMHG

## 2021-04-05 LAB
ALBUMIN SERPL ELPH-MCNC: 3 G/DL — LOW (ref 3.5–5.2)
ALP SERPL-CCNC: 205 U/L — HIGH (ref 30–115)
ALT FLD-CCNC: 17 U/L — SIGNIFICANT CHANGE UP (ref 0–41)
ANION GAP SERPL CALC-SCNC: 12 MMOL/L — SIGNIFICANT CHANGE UP (ref 7–14)
AST SERPL-CCNC: 38 U/L — SIGNIFICANT CHANGE UP (ref 0–41)
BASOPHILS # BLD AUTO: 0.13 K/UL — SIGNIFICANT CHANGE UP (ref 0–0.2)
BASOPHILS NFR BLD AUTO: 0.7 % — SIGNIFICANT CHANGE UP (ref 0–1)
BILIRUB SERPL-MCNC: 1.6 MG/DL — HIGH (ref 0.2–1.2)
BUN SERPL-MCNC: 28 MG/DL — HIGH (ref 10–20)
CALCIUM SERPL-MCNC: 10 MG/DL — SIGNIFICANT CHANGE UP (ref 8.5–10.1)
CHLORIDE SERPL-SCNC: 88 MMOL/L — LOW (ref 98–110)
CO2 SERPL-SCNC: 30 MMOL/L — SIGNIFICANT CHANGE UP (ref 17–32)
CREAT SERPL-MCNC: 0.6 MG/DL — LOW (ref 0.7–1.5)
EOSINOPHIL # BLD AUTO: 0.03 K/UL — SIGNIFICANT CHANGE UP (ref 0–0.7)
EOSINOPHIL NFR BLD AUTO: 0.2 % — SIGNIFICANT CHANGE UP (ref 0–8)
GALACTOMANNAN AG SERPL-ACNC: <0.5 INDEX — SIGNIFICANT CHANGE UP
GLUCOSE BLDC GLUCOMTR-MCNC: 156 MG/DL — HIGH (ref 70–99)
GLUCOSE SERPL-MCNC: 116 MG/DL — HIGH (ref 70–99)
HCT VFR BLD CALC: 26 % — LOW (ref 37–47)
HGB BLD-MCNC: 8.3 G/DL — LOW (ref 12–16)
IMM GRANULOCYTES NFR BLD AUTO: 6 % — HIGH (ref 0.1–0.3)
LYMPHOCYTES # BLD AUTO: 1.68 K/UL — SIGNIFICANT CHANGE UP (ref 1.2–3.4)
LYMPHOCYTES # BLD AUTO: 8.9 % — LOW (ref 20.5–51.1)
MAGNESIUM SERPL-MCNC: 2.2 MG/DL — SIGNIFICANT CHANGE UP (ref 1.8–2.4)
MCHC RBC-ENTMCNC: 31.9 G/DL — LOW (ref 32–37)
MCHC RBC-ENTMCNC: 33.5 PG — HIGH (ref 27–31)
MCV RBC AUTO: 104.8 FL — HIGH (ref 81–99)
MONOCYTES # BLD AUTO: 1.33 K/UL — HIGH (ref 0.1–0.6)
MONOCYTES NFR BLD AUTO: 7 % — SIGNIFICANT CHANGE UP (ref 1.7–9.3)
NEUTROPHILS # BLD AUTO: 14.58 K/UL — HIGH (ref 1.4–6.5)
NEUTROPHILS NFR BLD AUTO: 77.2 % — HIGH (ref 42.2–75.2)
NRBC # BLD: 0 /100 WBCS — SIGNIFICANT CHANGE UP (ref 0–0)
PLATELET # BLD AUTO: 139 K/UL — SIGNIFICANT CHANGE UP (ref 130–400)
POTASSIUM SERPL-MCNC: 4.6 MMOL/L — SIGNIFICANT CHANGE UP (ref 3.5–5)
POTASSIUM SERPL-SCNC: 4.6 MMOL/L — SIGNIFICANT CHANGE UP (ref 3.5–5)
PROT SERPL-MCNC: 5.9 G/DL — LOW (ref 6–8)
RBC # BLD: 2.48 M/UL — LOW (ref 4.2–5.4)
RBC # FLD: 22.9 % — HIGH (ref 11.5–14.5)
SODIUM SERPL-SCNC: 130 MMOL/L — LOW (ref 135–146)
WBC # BLD: 18.89 K/UL — HIGH (ref 4.8–10.8)
WBC # FLD AUTO: 18.89 K/UL — HIGH (ref 4.8–10.8)

## 2021-04-05 PROCEDURE — 99497 ADVNCD CARE PLAN 30 MIN: CPT

## 2021-04-05 PROCEDURE — 93010 ELECTROCARDIOGRAM REPORT: CPT

## 2021-04-05 PROCEDURE — 99233 SBSQ HOSP IP/OBS HIGH 50: CPT

## 2021-04-05 RX ORDER — SODIUM CHLORIDE 9 MG/ML
500 INJECTION INTRAMUSCULAR; INTRAVENOUS; SUBCUTANEOUS ONCE
Refills: 0 | Status: DISCONTINUED | OUTPATIENT
Start: 2021-04-05 | End: 2021-04-05

## 2021-04-05 RX ORDER — MAGNESIUM SULFATE 500 MG/ML
2 VIAL (ML) INJECTION ONCE
Refills: 0 | Status: DISCONTINUED | OUTPATIENT
Start: 2021-04-05 | End: 2021-04-05

## 2021-04-05 RX ORDER — ONDANSETRON 8 MG/1
4 TABLET, FILM COATED ORAL ONCE
Refills: 0 | Status: COMPLETED | OUTPATIENT
Start: 2021-04-05 | End: 2021-04-05

## 2021-04-05 RX ORDER — ATROPINE SULFATE 0.1 MG/ML
25 SYRINGE (ML) INJECTION ONCE
Refills: 0 | Status: DISCONTINUED | OUTPATIENT
Start: 2021-04-05 | End: 2021-04-05

## 2021-04-05 RX ADMIN — CHLORHEXIDINE GLUCONATE 1 APPLICATION(S): 213 SOLUTION TOPICAL at 05:10

## 2021-04-05 RX ADMIN — Medication 2 MILLIGRAM(S): at 05:10

## 2021-04-05 RX ADMIN — MORPHINE SULFATE 15 MILLIGRAM(S): 50 CAPSULE, EXTENDED RELEASE ORAL at 05:09

## 2021-04-05 RX ADMIN — ENOXAPARIN SODIUM 80 MILLIGRAM(S): 100 INJECTION SUBCUTANEOUS at 17:04

## 2021-04-05 RX ADMIN — LIDOCAINE 1 PATCH: 4 CREAM TOPICAL at 17:03

## 2021-04-05 RX ADMIN — ONDANSETRON 4 MILLIGRAM(S): 8 TABLET, FILM COATED ORAL at 14:47

## 2021-04-05 RX ADMIN — MORPHINE SULFATE 15 MILLIGRAM(S): 50 CAPSULE, EXTENDED RELEASE ORAL at 17:33

## 2021-04-05 RX ADMIN — Medication 5 MILLIGRAM(S): at 13:27

## 2021-04-05 RX ADMIN — MORPHINE SULFATE 7.5 MILLIGRAM(S): 50 CAPSULE, EXTENDED RELEASE ORAL at 14:49

## 2021-04-05 RX ADMIN — Medication 1 TABLET(S): at 13:26

## 2021-04-05 RX ADMIN — PANTOPRAZOLE SODIUM 40 MILLIGRAM(S): 20 TABLET, DELAYED RELEASE ORAL at 05:10

## 2021-04-05 RX ADMIN — MORPHINE SULFATE 7.5 MILLIGRAM(S): 50 CAPSULE, EXTENDED RELEASE ORAL at 15:19

## 2021-04-05 RX ADMIN — ENOXAPARIN SODIUM 80 MILLIGRAM(S): 100 INJECTION SUBCUTANEOUS at 05:10

## 2021-04-05 RX ADMIN — MORPHINE SULFATE 15 MILLIGRAM(S): 50 CAPSULE, EXTENDED RELEASE ORAL at 17:03

## 2021-04-05 RX ADMIN — Medication 5 MILLIGRAM(S): at 05:09

## 2021-04-05 RX ADMIN — Medication 2 MILLIGRAM(S): at 13:26

## 2021-04-05 RX ADMIN — POLYETHYLENE GLYCOL 3350 17 GRAM(S): 17 POWDER, FOR SOLUTION ORAL at 13:27

## 2021-04-05 RX ADMIN — Medication 500 MILLIGRAM(S): at 13:26

## 2021-04-05 NOTE — PROGRESS NOTE ADULT - CONVERSATION DETAILS
Met with patient's sisters. We had spoken to them On friday and plan was to re address GOC.   Sisters stated they feel that they do not medically understand what is going with patient. Brief medical update provided and we attempted to address GOC. Sisters are very focused on patient getting PT and then getting discharged to rehab. We explained to them that patient is on high level of oxygen and also very fatigued due to her medical condition and thus unable to participate in PT or be discharged.   Patient is pending molecular studies, sisters would like patient to get treated and feel that the treatment will cure her. As per prior heme onc notes hospice has been recommended but the sisters do not recall.  Medical team was called to provide medical update.  We also discussed the case extensively with heme onc.

## 2021-04-05 NOTE — PROGRESS NOTE ADULT - ASSESSMENT
70yFemale being evaluated for goals of care and symptom management. Pt has stage 4 lung cancer, with bone, liver and leptomeningeal spread. Pt seen for follow up of symptom management and goals of care.   Discussed goc with sisters as above today.  We also discussed the case with heme oncology. patient is pending molecular studies, but hospice has been recommended by them in prior conversations.       MEDD (morphine equivalent daily dose): 30mg/24hrs      See Recs below.  - DNR/DNI  -MS Contin 15mg q 12 hrs  -MSIR 7.5mg po Q 4 hrs PRN for pain or dyspnea      Please call [phone] with questions or concerns 24/7.   We will continue to follow.     Discussed with Dr West and heme oncology team, bedside RN and family.

## 2021-04-05 NOTE — PROGRESS NOTE ADULT - PROBLEM/PLAN-5
DISPLAY PLAN FREE TEXT
Elevated LFTs possibly 2/2 COVID-19 infection - resolving  - Avoid hepatotoxic agents

## 2021-04-05 NOTE — DISCHARGE NOTE FOR THE EXPIRED PATIENT - HOSPITAL COURSE
70 years old female from home and lives alone with PMHx of OA of bilateral knees, right rotator ruff tear (?), gout, diverticulosis, presented with difficulty ambulation due to worsening pain on hips and shoulders. Pt found to have femoral neck fracture, transferred to [facility] for surgery and since transferred back to [facility] for further oncological workup and PleurX catheter placement.  Patient admitted with Right Perihilar Mass w/ large left pleural effusion (Pulmonary adenoCA with metastasis to brain and bone). CT Chest No Cont showed malignant right upper lobe mass extending into the right hilum and mediastinum with occlusion of the right upper lobe airways +  Metastatic osteolytic right scapular lesion. Acute mildly displaced left glenoid fracture and age-indeterminate T11 compression deformity, underlying pathologic fractures are not excluded. Left greater than right bilateral adrenal nodularity, likely metastatic.  s/p thoracentesis 3/15 (drained 1L): +ve for malignant cells (adenocarcinoma). NM Bone scan "consistent with metastatic bone disease. Metastatic sites  include right and left ilium, right sacroiliac joint, L2, T8, T11 (photopenic) and the right and left femoral intertrochanteric ridges. s/p PleurX catheter 3/25, drainage per pulm >850 cc drained 4/2. MRI showed diffuse mets including brain with leptomeningeal involvement.    Heme/Onc following: sent molecular studies that were pending. In the interim patient was receiving decadron for cerebral edema.  Patient also had a nondisplaced femoral neck subcapital impaction fracture s/p corrective surgery at [facility]. CT Pelvis Bony Only No Cont showed multiple infiltrative bone lesions, largest at the right iliac crest with nondisplaced pathologic fracture and soft tissue mass extension. Findings are compatible with osseous metastasis. Consider nuclear medicine bone scan for the assessment of skeletal metastasis. Nondisplaced right subcapital femoral neck impaction fracture.   Duplex 3/28 +ve for DVT of the left gastrocnemius vein. Patient was on therapeutic lovenox.     At 18:30, patient was found to have her NRB mask off, saturating at 70%. Non rebreather was adjusted - oxygen saturation increased to 93%. Soon thereafter a rapid response was called. Nurse states that patient's pupils became dilated and fixed, and she no longer responded to questions. Patient vitals and airway were assessed, remained unresponsive.  Patient became progressively bradycardic. Atropine and magnesium were administered. Family was contacted, confirmed DNR/DNI status. Patient showed no pulse at 18:41, ultrasound used to confirm cardiac status. Patient pronounced at 18:41.

## 2021-04-05 NOTE — PROGRESS NOTE ADULT - REASON FOR ADMISSION
LUNG CANCER
Pathological fx
hip arthroplasty, metastatic  lung cancer
right hip arthroplasty
Lung CA
Lung CA
Lung Cancer
hip pain,
SOB
hip pain,
hip pain,
right hip arthroplasty

## 2021-04-05 NOTE — PROGRESS NOTE ADULT - SUBJECTIVE AND OBJECTIVE BOX
DOV HENLEY             MRN-[id number]    CC: right hip pain/weakness    HPI: Pt newly diagnosed w metastatic lung caner, mets, bone, liver, and leptomeningeal spread. S/P right hip arthroplasty at [facility], right plurex drain. Overall debility      SUBJECTIVE: Patient seen and examined at bedside. She is very weak and fatigued. Non rebreather on at 90% Fi02.   Patient's sisters visited today. Please see GOC as below.     ROS:  DYSPNEA: YES   NAUS/VOM: NO	  SECRETIONS: NO	  AGITATION: NO  Pain (Y/N):     YES, back and right hip   -Provocation/Palliation: movement   -Quality/Quantity: sharp stabbing   -Radiating: yes hip and back and chest   -Severity: pain is severe   -Timing/Frequency: constant  -Impact on ADLs: unable to ambulate      PEx:  70y    General: AAOx2 , lying in bed  , fatigued and weak   HEENT:  NCAT PERRL EOMI Non icteric MOM  Neck: Supple no masses  CVS: RR S1S2 edema general   Resp: Labored breathing, use of accessory muscles, dyspnea at rest while talking   GI:  Soft NT ND BS+  :   [place]  Musc: No C/C/E    Neuro: Follows commands No focal deficits  Psych: anxious affect   Skin: Non jaundiced   Lymph: Normal                    Last BM:   03-31-21 @ 07:01  -  04-01-21 @ 07:00  --------------------------------------------------------  OUT: 0 mL    04-01-21 @ 07:01  -  04-02-21 @ 07:00  --------------------------------------------------------  OUT: 0 mL        ALLERGIES:     OPIATE NAÏVE (Y/N): n  MEDICATIONS: REVIEWED  MEDICATIONS  (STANDING):  ascorbic acid 500 milliGRAM(s) Oral daily  chlorhexidine 4% Liquid 1 Application(s) Topical <User Schedule>  dexAMETHasone     Tablet 2 milliGRAM(s) Oral three times a day  dronabinol 5 milliGRAM(s) Oral three times a day  enoxaparin Injectable 80 milliGRAM(s) SubCutaneous every 12 hours  morphine ER Tablet 15 milliGRAM(s) Oral every 12 hours  multivitamin 1 Tablet(s) Oral daily  pantoprazole    Tablet 40 milliGRAM(s) Oral before breakfast  polyethylene glycol 3350 17 Gram(s) Oral daily  senna 2 Tablet(s) Oral at bedtime    MEDICATIONS  (PRN):  bisacodyl Suppository 10 milliGRAM(s) Rectal daily PRN Constipation  lidocaine   Patch 1 Patch Transdermal daily PRN Back pain  morphine  IR 7.5 milliGRAM(s) Oral every 4 hours PRN mild to severe pain      LABS: REVIEWED  CBC:                        8.3    18.89 )-----------( 139      ( 05 Apr 2021 06:44 )             26.0     CMP:    04-05    130<L>  |  88<L>  |  28<H>  ----------------------------<  116<H>  4.6   |  30  |  0.6<L>    Ca    10.0      05 Apr 2021 06:44  Mg     2.2     04-05    TPro  5.9<L>  /  Alb  3.0<L>  /  TBili  1.6<H>  /  DBili  x   /  AST  38  /  ALT  17  /  AlkPhos  205<H>  04-05  Albumin, Serum: 3.0 g/dL (04-05-21 @ 06:44)      IMAGING: REVIEWED    EKG reviewed.    ADVANCED DIRECTIVES:       DNR/DNI    DECISION MAKER: patient has been making decisions, condition is declining  LEGAL SURROGATE: Spoke to pt's HCPs, Lesvia at pt's request    PSYCHOSOCIAL-SPIRITUAL ASSESSMENT:       Reviewed       Care plan unchanged       Care plan adjusted as above	    GOALS OF CARE DISCUSSION       Palliative care info/counseling provided	           Family meeting - for monday 4/5/21       Advanced Directives addressed please see Advance Care Planning Note	           See previous Palliative Medicine Note       Documentation of GOC: 4/2/21    CURRENT DISPO PLAN:     WILL REMAIN IN HOSPITAL IS ACUTE     REFERRALS	        Palliative Med        Unit SW/Case Mgmt              Speech/Swallow       Patient/Family Support      Hospice       Nutrition       Dietician       PT/OT   No

## 2021-04-05 NOTE — PROGRESS NOTE ADULT - PROBLEM SELECTOR PROBLEM 1
Malignant neoplasm of upper lobe of right lung

## 2021-04-05 NOTE — PROGRESS NOTE ADULT - ATTENDING COMMENTS
Autumn Allan MD  Hospitalist   Spectra: [phone]    Patient is a 70y old  Female who presents with a chief complaint of Pathological fx (24 Mar 2021 13:08)      INTERVAL HPI/OVERNIGHT EVENTS: reports hip pain on minimal movement   otherwise no acute distress noted     REVIEW OF SYSTEMS: negative  Vital Signs Last 24 Hrs  T(C): 35.7 (24 Mar 2021 08:00), Max: 36.7 (23 Mar 2021 17:17)  T(F): 96.2 (24 Mar 2021 08:00), Max: 98 (23 Mar 2021 17:17)  HR: 97 (24 Mar 2021 08:00) (97 - 113)  BP: 100/57 (24 Mar 2021 08:00) (100/57 - 138/74)  BP(mean): --  RR: 18 (24 Mar 2021 08:00) (18 - 20)  SpO2: 100% (23 Mar 2021 17:42) (96% - 100%)    PHYSICAL EXAM:   NAD; Normocephalic;   LUNGS - no wheezing  HEART: S1 S2+   ABDOMEN: Soft, Nontender, non distended  EXTREMITIES: no cyanosis; no edema  NERVOUS SYSTEM:  Awake and alert; no focal neuro deficits appreciated  right hip dressing in pain     LABS:                        8.0    13.13 )-----------( 254      ( 24 Mar 2021 05:20 )             23.2     03-24    130<L>  |  94<L>  |  16  ----------------------------<  90  4.8   |  26  |  <0.5<L>    Ca    9.0      24 Mar 2021 05:20  Phos  3.9     03-24  Mg     1.9     03-24    TPro  5.4<L>  /  Alb  2.7<L>  /  TBili  0.7  /  DBili  x   /  AST  27  /  ALT  9   /  AlkPhos  148<H>  03-24      A/P:-  Pt is seen and evaluated by bedside.   1. Nondisplaced femoral neck subcapital impaction fracture  2. Stage IV Lung Adenocarcinoma with RUL mass extending into right hilum  3. Large right malignant pleural effusion s/p thora, positive for adenocarcinoma  4. ambulatory dysfunction   5. gout   6. obestiy     - patient presented with ambulatory dysfunction 2/2 chronic pain and dyspnea with minimal exertion   - patient was transffered back from [facility] after hip surgery   - thora resulted - adenocarcinoma   - loculated pleural effusion   - plan for pleurex placement - follow up with pulmonary   - plan for targeted/immunotherapy - follow with hem/onc   - lovenox
Autumn Allan MD  Hospitalist   Spectra: [phone]    Patient is a 70y old  Female who presents with a chief complaint of Lung CA (29 Mar 2021 12:33)      INTERVAL HPI/OVERNIGHT EVENTS: no acute overnight events noted     REVIEW OF SYSTEMS: negative  Vital Signs Last 24 Hrs  T(C): 36.6 (29 Mar 2021 09:16), Max: 36.6 (29 Mar 2021 09:16)  T(F): 97.8 (29 Mar 2021 09:16), Max: 97.8 (29 Mar 2021 09:16)  HR: 106 (29 Mar 2021 11:35) (100 - 109)  BP: 108/75 (29 Mar 2021 11:35) (93/55 - 108/75)  BP(mean): --  RR: 18 (29 Mar 2021 11:35) (18 - 18)  SpO2: 98% (29 Mar 2021 11:35) (98% - 98%)    PHYSICAL EXAM:   NAD; Normocephalic;   LUNGS - no wheezing  HEART: S1 S2+   ABDOMEN: Soft, Nontender, non distended  EXTREMITIES: no cyanosis; no edema  NERVOUS SYSTEM:  Awake and alert; no focal neuro deficits appreciated  right chest tube in place     A/P:-  Pt is seen and evaluated by bedside.   1. Nondisplaced femoral neck subcapital impaction fracture  2. Stage IV Lung Adenocarcinoma with RUL mass extending into right hilum  3. Large right malignant pleural effusion s/p thora, positive for adenocarcinoma  4. ambulatory dysfunction   5. gout   6. obestiy     - patient presented with ambulatory dysfunction 2/2 chronic pain and dyspnea with minimal exertion   - patient was transferred back from [facility] after hip surgery   - thora resulted - adenocarcinoma   - loculated pleural effusion   - s/p Pleurx placement   - expansion of right lung noted on post procedure xray   - off NRB --> now on NC   - taper down as tolerated   - MRI - Diffuse osseous metastasis are noted throughout the thoracic and lumbar spines. 1 cm enhancement in the right paracentral splenium of the corpus callosum with surrounding edema likely representing a metastatic lesion. No other intraparenchymal abnormal enhancement allowing for limitation from motion. (see details in results)  -  path from femoral head - - Metastatic adenocarcinoma involving bone and bone marrow; immunostains are pending  - path from pleural tap- Immunohistochemical stains are performed and show the tumor is  positive for CK7, negative for CK20, Cordell-EP4, TTF-1, CDX2, PAX-8; few cells are positive for calretinin, most likely representing reactive mesothelial cells.  - plan for targeted/immunotherapy - follow with hem/onc   - pain meds adjusted as per palliative recs   - HCP formed filled with palliative at bedside   - lovenox .      Handoff:  Pending: final biopsy results from biopsy, hem/onc follow up   Dispo: from home, unclear dispo planning based on further treatment plan
#Lung adenoca metastatic to liver, bone, brain, adrenal, spine  c/b R hip pathological fx s/p repair  f/u pdl1/ markers  bx, cyto c/w adenoca  c/b R pleural effusion s/p chest tube  ongoing goc  f/u onc, pulm  nc to keep spo2 >92
70 yr old female presented with SOB.  # Adeno Ca of lung--rt perihilar mass and lt pleural effusion s/p pleurX cath-- pulmonary drained 850 cc 4/2/21.  diffuse mets with leptomeningeal involvement.  Rt subcapital femoral neck fracture s/p surgery at [facility]-- Dr Lord-- orthopedic oncologist  -  Oncology has suggested--Can start 2mg decadron for cerebral edema until molecular studies return.   patient is DNR and DNI  # hyponatremia--  Na today is 130--likley  due to SIADH form adeno Ca of lung  # Hypercalcemia-on decadron.   very poor prognosis-- pending chemo after molecular studies.   hospice discussion with family after options of treatment will be laid down by oncology-- who are currently waiting for molecular studies.
No acute events noted overnight   patient is comfortably sitting in chair   currently on 6l oxygen - no distress     A/P:-  Pt is seen and evaluated by bedside.   1. Nondisplaced femoral neck subcapital impaction fracture  2. Stage IV Lung Adenocarcinoma with RUL mass extending into right hilum  3. Large right malignant pleural effusion s/p thora, positive for adenocarcinoma  4. ambulatory dysfunction   5. gout   6. obestiy     - patient presented with ambulatory dysfunction 2/2 chronic pain and dyspnea with minimal exertion   - patient was transferred back from [facility] after hip surgery   - thora resulted - adenocarcinoma   - loculated pleural effusion   - s/p Pleurx placement   - expansion of right lung noted on post procedure xray   - off NRB --> now on NC   - taper down as tolerated   - MRI - Diffuse osseous metastasis are noted throughout the thoracic and lumbar spines. 1 cm enhancement in the right paracentral splenium of the corpus callosum with surrounding edema likely representing a metastatic lesion. No other intraparenchymal abnormal enhancement allowing for limitation from motion. (see details in results)  -  path from femoral head - - Metastatic adenocarcinoma involving bone and bone marrow; immunostains are pending  - path from pleural tap- Immunohistochemical stains are performed and show the tumor is  positive for CK7, negative for CK20, Cordell-EP4, TTF-1, CDX2, PAX-8; few cells are positive for calretinin, most likely representing reactive mesothelial cells.  - plan for targeted/immunotherapy - follow with hem/onc   - pain meds adjusted as per palliative recs   - HCP formed filled with palliative at bedside   - lovenox .      Handoff:  Pending: final biopsy results from biopsy, hem/onc follow up   Dispo: snf when ready   discussed with sister aretha in depth
Patient examined at the bedside, breathing slightly better after thoracentesis , still with uncontrolled back pain , waiting for next dose of morphine .   followed by palliative care .  extensive disease including liver , brain , lower spine with epidural extension , suspected dural and leptomeningeal disease .  awaiting PDL1 and molecular studies , doubt candidate for therapy in absence of actionable mutation .   consider long acting opiate , RT ? grave prognosis .
Patient seen and examined.  Agree with above.  Metastatic adenocarcinoma of the lung with rapid re-accumulation of R pleural effusion.  Will plan pleurx catheter for tomorrow.  Very poor prognosis.
70 yr old female presented with SOB.  # Adeno Ca of lung--rt perihilar mass and lt pleural effusion s/p pleurX cath-- pulmonary drained 850 cc today.  diffuse mets with leptomeningeal involvement.  Rt subcapital femoral neck fracture s/p surgery at [facility]  -  Oncology has suggested--Can start 2mg decadron for cerebral edema until molecular studies return.   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   very poor prognosis-- pending chemo after molecular studies.
Agree with above A/P.  had a long discussion with peter jay pt and her sisters about her overall condition, extent of disease and results of lab, radiology and pathology.  Pt is not a candidate for single agent immunotherapy.  She is too frail to tolerate chemo.  Recommend supportive care only.  Family wants to wait for results of NGS before making a decision regarding hospice
Autumn Allan MD  Hospitalist   Spectra: [phone]    Patient is a 70y old  Female who presents with a chief complaint of Lung cancer (27 Mar 2021 13:13)      INTERVAL HPI/OVERNIGHT EVENTS: no acute overnight events noted   patient is sitting comfortably in chair   no acute distress noted   PT at bedside   reports some discomfort around pigtail site     REVIEW OF SYSTEMS: negative  Vital Signs Last 24 Hrs  T(C): 36.3 (27 Mar 2021 07:35), Max: 36.3 (27 Mar 2021 07:35)  T(F): 97.4 (27 Mar 2021 07:35), Max: 97.4 (27 Mar 2021 07:35)  HR: 100 (27 Mar 2021 11:05) (100 - 107)  BP: 116/75 (27 Mar 2021 11:05) (101/68 - 116/75)  BP(mean): --  RR: 20 (27 Mar 2021 11:05) (20 - 21)  SpO2: 99% (27 Mar 2021 11:05) (97% - 99%)    PHYSICAL EXAM:   NAD; Normocephalic;   LUNGS - no wheezing  HEART: S1 S2+   ABDOMEN: Soft, Nontender, non distended  EXTREMITIES: no cyanosis; no edema  NERVOUS SYSTEM:  Awake and alert; no focal neuro deficits appreciated    LABS:                        8.3    14.25 )-----------( 281      ( 27 Mar 2021 06:53 )             24.5     03-27    132<L>  |  90<L>  |  20  ----------------------------<  101<H>  4.8   |  31  |  0.6<L>    Ca    10.1      27 Mar 2021 06:53  Mg     2.1     03-27    TPro  6.0  /  Alb  2.9<L>  /  TBili  1.2  /  DBili  x   /  AST  24  /  ALT  10  /  AlkPhos  166<H>  03-27    A/P:-  Pt is seen and evaluated by bedside.   1. Nondisplaced femoral neck subcapital impaction fracture  2. Stage IV Lung Adenocarcinoma with RUL mass extending into right hilum  3. Large right malignant pleural effusion s/p thora, positive for adenocarcinoma  4. ambulatory dysfunction   5. gout   6. obestiy     - patient presented with ambulatory dysfunction 2/2 chronic pain and dyspnea with minimal exertion   - patient was transferred back from [facility] after hip surgery   - thora resulted - adenocarcinoma   - loculated pleural effusion   - s/p Pleurx placement - total 2L of fluids were taken out over last 48 hours  - expansion of right lung noted on post procedure xray   - on NRB   - taper down as tolerated   - MRI - Diffuse osseous metastasis are noted throughout the thoracic and lumbar spines. 1 cm enhancement in the right paracentral splenium of the corpus callosum with surrounding edema likely representing a metastatic lesion. No other intraparenchymal abnormal enhancement allowing for limitation from motion. (see details in results)  - plan for targeted/immunotherapy - follow with hem/onc   - pain meds adjusted as per palliative recs   - HCP formed filled with palliative at bedside   - lovenox .
patient seen and examined, agree with above, malignant r effusion with rapid reaccumulation, sp pleurx, very poor prognosis
70 yr old female presented with SOB.  # Adeno Ca of lung--rt perihilar mass and lt pleural effusion s/p pleurX cath-- pulmonary drained 850 cc 4/2/21.  diffuse mets with leptomeningeal involvement.  Rt subcapital femoral neck fracture s/p surgery at [facility]-- Dr Lord-- orthopedic oncologist  -  Oncology has suggested--Can start 2mg decadron for cerebral edema until molecular studies return.   patient is DNR and DNI  # hyponatremia--  Na today is 128--likley  due to SIADH form adeno Ca of lung  # Hypercalcemia-on decadron-- improving levels of calcium   very poor prognosis-- pending chemo after molecular studies.   hospice discussion with family after options of treatment will be laid down by oncology-- who are currently waiting for molecular studies.    I spoke with her step sisters to give them an update and they are very involved in her care.
patient seen and examined, agree with above, SP pleurx, drained 1 l out, will drain again on monday
Autumn Allan MD  Hospitalist   Spectra: [phone]    Patient is a 70y old  Female who presents with a chief complaint of Lung CA (26 Mar 2021 12:34)      INTERVAL HPI/OVERNIGHT EVENTS: no acute overnight events noted   another 1 l of fluid taken out by pulm     REVIEW OF SYSTEMS: negative  Vital Signs Last 24 Hrs  T(C): 35.9 (26 Mar 2021 07:55), Max: 35.9 (26 Mar 2021 07:55)  T(F): 96.7 (26 Mar 2021 07:55), Max: 96.7 (26 Mar 2021 07:55)  HR: 104 (26 Mar 2021 07:55) (104 - 112)  BP: 110/69 (26 Mar 2021 07:55) (110/69 - 119/70)  BP(mean): --  RR: 20 (26 Mar 2021 07:55) (19 - 20)  SpO2: --    PHYSICAL EXAM:   NAD; Normocephalic;   LUNGS - no wheezing  HEART: S1 S2+   ABDOMEN: Soft, Nontender, non distended  EXTREMITIES: no cyanosis; no edema  NERVOUS SYSTEM:  Awake and alert; no focal neuro deficits appreciated  right sided chest tube in place     LABS:                        7.8    12.19 )-----------( 257      ( 26 Mar 2021 07:18 )             23.2     03-26    131<L>  |  91<L>  |  17  ----------------------------<  99  4.5   |  29  |  0.5<L>    Ca    9.4      26 Mar 2021 07:18  Mg     1.9     03-26    TPro  5.7<L>  /  Alb  2.9<L>  /  TBili  0.8  /  DBili  x   /  AST  27  /  ALT  10  /  AlkPhos  147<H>  03-26        CAPILLARY BLOOD GLUCOSE          A/P:-  Pt is seen and evaluated by bedside.   1. Nondisplaced femoral neck subcapital impaction fracture  2. Stage IV Lung Adenocarcinoma with RUL mass extending into right hilum  3. Large right malignant pleural effusion s/p thora, positive for adenocarcinoma  4. ambulatory dysfunction   5. gout   6. obestiy     - patient presented with ambulatory dysfunction 2/2 chronic pain and dyspnea with minimal exertion   - patient was transferred back from [facility] after hip surgery   - thora resulted - adenocarcinoma   - loculated pleural effusion   - s/p Pleurx placement - total 2L of fluids were taken out over last 48 hours  - expansion of right lung noted on post procedure xray   - on NRB   - taper down as tolerated   - plan for targeted/immunotherapy - follow with hem/onc   - HCP formed filled with palliative at bedside   - lovenox .    PCP updated about clinical care.      Provider Handoff:  Pending: hem/onc work up, possible immunotherapy   Dispo: home when stable   Family discussion: attempted to call sister aretha, palliative will attempt to reach out to sister as well     Plan of care was discussed with patient ; all questions and concerns were addressed and care was aligned with patient's wishes.
Autumn Allan MD  Hospitalist   Spectra: [phone]    Patient is a 70y old  Female who presents with a chief complaint of Patient is a 70y old  Female who presents with a chief complaint of Lung Cancer (25 Mar 2021 12:19) (25 Mar 2021 13:21)      INTERVAL HPI/OVERNIGHT EVENTS: patient had episode of hypoxic episode - placed on NRB     REVIEW OF SYSTEMS: negative  Vital Signs Last 24 Hrs  T(C): 36.4 (25 Mar 2021 15:35), Max: 37.1 (25 Mar 2021 12:50)  T(F): 97.5 (25 Mar 2021 15:35), Max: 98.7 (25 Mar 2021 12:50)  HR: 100 (25 Mar 2021 15:35) (100 - 120)  BP: 112/62 (25 Mar 2021 15:35) (103/62 - 144/66)  BP(mean): --  RR: 18 (25 Mar 2021 15:35) (18 - 20)  SpO2: 100% (25 Mar 2021 13:37) (98% - 100%)    PHYSICAL EXAM:   NAD; Normocephalic;   LUNGS - no wheezing,  decreased breath sound on right side   HEART: S1 S2+   ABDOMEN: Soft, Nontender, non distended  EXTREMITIES: no cyanosis; no edema  NERVOUS SYSTEM:  Awake and alert; no focal neuro deficits appreciated  right hip dressing in place   LABS:                        8.0    13.13 )-----------( 254      ( 24 Mar 2021 05:20 )             23.2     03-24    130<L>  |  94<L>  |  16  ----------------------------<  90  4.8   |  26  |  <0.5<L>    Ca    9.0      24 Mar 2021 05:20  Phos  3.9     03-24  Mg     1.9     03-24    TPro  5.4<L>  /  Alb  2.7<L>  /  TBili  0.7  /  DBili  x   /  AST  27  /  ALT  9   /  AlkPhos  148<H>  03-24    A/P:-  Pt is seen and evaluated by bedside.   1. Nondisplaced femoral neck subcapital impaction fracture  2. Stage IV Lung Adenocarcinoma with RUL mass extending into right hilum  3. Large right malignant pleural effusion s/p thora, positive for adenocarcinoma  4. ambulatory dysfunction   5. gout   6. obestiy     - patient presented with ambulatory dysfunction 2/2 chronic pain and dyspnea with minimal exertion   - patient was transferred back from [facility] after hip surgery   - thora resulted - adenocarcinoma   - loculated pleural effusion   - s/p pleurex placement today   - expansion of right lung noted on post procedure xray   - placed on NRB last night   - taper down as tolerated   - plan for targeted/immunotherapy - follow with hem/onc   - lovenox .    PCP updated about clinical care.
#Lung adenoca metastatic to liver, bone, brain, adrenal, spine; +leptomeningeal involvement  c/b R hip pathological fx s/p repair  bx, cyto c/w adenoca  c/b R pleural effusion s/p chest tube  not candidate for immunotherapy or chemo per onc  to f/u final staining on path per family request  ongoing goc  nc to keep spo2 >92
Patient seen and examined at bedside. She has declined physically. Patient is being followed for pain and dyspnea. Recommendations as above.  We had a C conversations with the sisters, please see note. THey want to meet and have a family meeting on Monday at 12 30 PM.

## 2021-04-05 NOTE — CHART NOTE - NSCHARTNOTEFT_GEN_A_CORE
Palliative Care [facility]  Spiritual Assessment Intervention Outcome (TABITHA)    Sabrina Group: _______Catholic________________  Other Affiliation ___________________________________     : _____________________________________________ Phone: _____________________________________________    Patient/Family: _________________________________  gives consent for  to contact  Yes ______ No ________      Observed Symptoms:     [   ]   Anger/Resentment      [   ] Denial                   [   ]   Frustration            [  ]  Realistic         [   ]   Anxiety                           [   ] Detachment        [   ]   Helplessness         [  ]   Overwhelmed    [   ]  Hopefulness                            [   ]  Guilt                                 [   ]   Unrealistic         [   ]   Regrets                  [  ]  Tired                      [  ] Shame    [   ]  Confusion                       [   ]  Fear                     [   ]   Lack of Support    [   ] Lack of Trust        [  ]   Crying     [  ]  Forgiveness      Theological Issues: None explored today    1. Image of God:        2. Relationship with God:          3. Important Spiritual Resources: [x ] Prayer   [  ] Scripture  [  ] Anointing  [  ] Communion        4. Patient/Family Spiritual Issues:          5. Proposed Spiritual Plan of Care: to follow up as needed

## 2021-04-05 NOTE — PROGRESS NOTE ADULT - PROBLEM SELECTOR PLAN 2
-Diffuse bone mets causing pain  -Continue MS contin 15 mg Q12h   - continue MR IR 7.5 mg Q4h PRN for mild to severe pain
-Diffuse bone mets causing pain  -Continue MS contin 15 mg Q12h   - continue MR IR 7.5 mg Q4h PRN for mild to severe pain
-Diffuse bone mets causing pain  -DC Iv morphine  -Start MS contin 15 mg Q12h   -Start MR IR 7.5 mg Q4h PRN for mild to severe pain
-Diffuse bone mets causing pain  -Continue MS contin 15 mg Q12h   - continue MR IR 7.5 mg Q4h PRN for mild to severe pain

## 2021-04-05 NOTE — PROGRESS NOTE ADULT - NSICDXPILOT_GEN_ALL_CORE
[place]

## 2021-04-05 NOTE — PROGRESS NOTE ADULT - PROBLEM SELECTOR PLAN 6
Patient has NC and non rebreather mask.  We can utilize MS IR for episodes of dyspnea.
Patient has NC and non rebreather mask.  We can utilize MS IR for episodes of dyspnea.

## 2021-04-05 NOTE — PROGRESS NOTE ADULT - PROBLEM SELECTOR PLAN 3
due to bone mets  -DC Iv morphine  -Start MS contin 15 mg Q12h   -Start MR IR 7.5 mg Q4h PRN for mild to severe pain
due to bone mets  -continue MS contin 15 mg Q12h   -continue MS IR 7.5 mg Q4h PRN for mild to severe pain

## 2021-04-05 NOTE — PROGRESS NOTE ADULT - PROBLEM SELECTOR PLAN 5
continue senna 2 tabs hs  Continue dulcolax supp
continue senna 2 tabs hs  Continue dulcolax supp as needed
continue senna 2 tabs hs  Continue dulcolax supp
continue senna 2 tabs hs  Continue dulcolax supp as needed

## 2021-04-05 NOTE — PROGRESS NOTE ADULT - PROVIDER SPECIALTY LIST ADULT
Internal Medicine
Heme/Onc
Heme/Onc
Internal Medicine
Pulmonology
Pulmonology
Hospitalist
Internal Medicine
Pulmonology
Heme/Onc
Heme/Onc
Internal Medicine
Internal Medicine
Palliative Care
Internal Medicine
Palliative Care
Palliative Care

## 2021-04-05 NOTE — PROGRESS NOTE ADULT - SUBJECTIVE AND OBJECTIVE BOX
DAILY PROGRESS NOTE  ===========================================================    Patient Information:  DOV HENLEY  /  70y  /  Female  /  MRN#: [id number]    Hospital Day: 13d     |:::::::::::::::::::::::::::| SUBJECTIVE |:::::::::::::::::::::::::::|    OVERNIGHT EVENTS:   TODAY: Patient was seen today at bedside. Review of systems is otherwise negative.    |:::::::::::::::::::::::::::| OBJECTIVE |:::::::::::::::::::::::::::|    VITAL SIGNS: Last 24 Hours  T(C): 35.7 (05 Apr 2021 07:44), Max: 36.4 (04 Apr 2021 15:30)  T(F): 96.3 (05 Apr 2021 07:44), Max: 97.6 (04 Apr 2021 15:30)  HR: 94 (05 Apr 2021 07:44) (94 - 94)  BP: 109/69 (05 Apr 2021 07:44) (100/56 - 109/69)  BP(mean): --  RR: 20 (05 Apr 2021 07:44) (18 - 20)  SpO2: 88% (05 Apr 2021 09:12) (88% - 98%)    PHYSICAL EXAM:  GEN: No acute distress  LUNGS: Clear to auscultation bilaterally   HEART: S1/S2 present. RRR.   ABD/ GI: Soft, non-tender, non-distended. Bowel sounds present  EXT: NC/NC/NE/2+PP/FUENTES  NEURO: AAOX3      LAB RESULTS:                        8.3    18.89 )-----------( 139      ( 05 Apr 2021 06:44 )             26.0     04-05    130<L>  |  88<L>  |  28<H>  ----------------------------<  116<H>  4.6   |  30  |  0.6<L>    Ca    10.0      05 Apr 2021 06:44  Mg     2.2     04-05    TPro  5.9<L>  /  Alb  3.0<L>  /  TBili  1.6<H>  /  DBili  x   /  AST  38  /  ALT  17  /  AlkPhos  205<H>  04-05      MICROBIOLOGY:  Culture - Fungal, Body Fluid (03.25.21 @ 11:30)   Specimen Source: .Body Fluid None   Culture Results:   No growth Culture - Acid Fast - Body Fluid w/Smear (03.25.21 @ 11:30)   Specimen Source: .Body Fluid None   Acid Fast Bacilli Smear:   No acid fast bacilli seen by fluorochrome stain   Culture Results:   No growth at 1 week.   RADIOLOGY:  < from: Xray Chest 1 View- PORTABLE-Routine (Xray Chest 1 View- PORTABLE-Routine in AM.) (04.03.21 @ 06:38) >  Impression:    Unchanged right upper lung field confluent opacity. No pneumothorax.      < end of copied text >    ALLERGIES:  moxifloxacin (Anaphylaxis (Moderate))      ===========================================================

## 2021-04-05 NOTE — CHART NOTE - NSCHARTNOTESELECT_GEN_ALL_CORE
Event Note
PleurX drainage/Event Note
Rapid Response
Comms/Event Note
Event Note
PleurX Catheter Placement/Event Note

## 2021-04-05 NOTE — PROGRESS NOTE ADULT - PROBLEM SELECTOR PROBLEM 3
Chronic midline low back pain without sciatica

## 2021-04-05 NOTE — PROGRESS NOTE ADULT - PROBLEM SELECTOR PROBLEM 5
Constipation due to pain medication

## 2021-04-05 NOTE — PROGRESS NOTE ADULT - ASSESSMENT
70 years old female from home and lives alone with PMHx of OA of bilateral knees, right rotator ruff tear (?), gout, diverticulosis, presented with difficulty ambulation due to worsening pain on hips and shoulders. 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 Acute mildly displaced left glenoid fracture and age-indeterminate T11 compression deformity, underlying pathologic fractures are not excluded. Left greater than right bilateral adrenal nodularity, likely metastatic  - s/p thoracentesis 3/15 (drained 1L): +ve for malignant cells (adenocarcinoma)   - NM Bone scan "consistent with metastatic bone disease. Metastatic sites  include right and left ilium, right sacroiliac joint, L2, T8, T11 (photopenic) and the right and left femoral intertrochanteric ridges."  - s/p PleurX catheter 3/25, drainage per pulm >850 cc drained 4/2  - MRI performed--shows diffuse mets including brain with leptomeningeal involvement  - f/u Rad/Onc as outpt 3-4 wks  - Heme/Onc following: family awaiting NGS. if negative then hospice, if mutation then treatment options will be assessed.   - c/w interim, recommend aggressive pain management and supportive care  - c/w Marinol 5mg BID added for appetite  - c/w Decadron 2mg for cerebral edema  - Pain regimen per palliative team  - Palliative following    # Nondisplaced femoral neck subcapital impaction fracture s/p corrective surgery at [facility]  - CT Pelvis Bony Only No Cont shows Multiple infiltrative bone lesions, largest at the right iliac crest with nondisplaced pathologic fracture and soft tissue mass extension. Findings are compatible with osseous metastasis. Consider nuclear medicine bone scan for the assessment of skeletal metastasis. Nondisplaced right subcapital femoral neck impaction fracture.   - Ortho following--appreciate recs    #L Gastrocnemius DVT  - Duplex 3/28 +ve for DVT of the left gastrocnemius vein  - c/w therapeutic lovenox (Lung adeno has low risk of bleeding)    #Hyponatremia  - possibly due to malignancy  - fluid restriction 1 L  - follow up BMP     # Mild hypercalcemia - resolved   - Ca 10.4, albumin 4.4  - Likely secondary to dehydration (patient reports not eating or drinking much because it was difficult for her to get around)    # Gout  - Not on any medication  - Will avoid red meat diet while inpatient    #MISC  DVT ppx: Therapeutic lovenox  GI ppx: Protonix  Diet: Regular  Ambulatory status: as tolerated,   Code status: DNR/I

## 2021-04-05 NOTE — PROGRESS NOTE ADULT - PROBLEM SELECTOR PLAN 1
Oncology discussed prognosis with family.  Family wants to wait for further studies before deciding.   Declining.
Patient awaiting results to start treatment.  Declining.
Oncology discussed prognosis with family.  Family wants to wait for further studies before deciding.   Declining, palliative care introduced comfort measures agreed to have meeting on 4/5/21 at12:30pm w palliative team
Oncology discussed prognosis with family.  Family wants to wait for further studies before deciding.   Patient is declining and on non rebreather with 90 % Fio2.  However currently family would like to wait for molecular study results.

## 2021-04-05 NOTE — PROGRESS NOTE ADULT - ATTENDING SUPERVISION STATEMENT
Fellow
Resident
Resident
Fellow
Fellow
Resident
Fellow
Fellow
Resident
ACP

## 2021-04-05 NOTE — CHART NOTE - NSCHARTNOTEFT_GEN_A_CORE
At 18:30, patient was found to have her NRB mask off, saturating at 70%. Non rebreather was adjusted - oxygen saturation increased to 93%. Soon thereafter a rapid response was called. Nurse states that patient's pupils became dilated and fixed, and she no longer responded to questions. Patient vitals and airway were assessed, remained unresponsive.  Patient became progressively bradycardic. Atropine and magnesium were administered. Family was contacted, confirmed DNR/DNI status. Patient showed no pulse at 18:41, ultrasound used to confirm cardiac status. Patient pronounced at 18:41.

## 2021-04-14 LAB
CULTURE RESULTS: SIGNIFICANT CHANGE UP
SPECIMEN SOURCE: SIGNIFICANT CHANGE UP

## 2021-04-15 DIAGNOSIS — C79.72 SECONDARY MALIGNANT NEOPLASM OF LEFT ADRENAL GLAND: ICD-10-CM

## 2021-04-15 DIAGNOSIS — C34.11 MALIGNANT NEOPLASM OF UPPER LOBE, RIGHT BRONCHUS OR LUNG: ICD-10-CM

## 2021-04-15 DIAGNOSIS — M17.0 BILATERAL PRIMARY OSTEOARTHRITIS OF KNEE: ICD-10-CM

## 2021-04-15 DIAGNOSIS — J91.0 MALIGNANT PLEURAL EFFUSION: ICD-10-CM

## 2021-04-15 DIAGNOSIS — G93.6 CEREBRAL EDEMA: ICD-10-CM

## 2021-04-15 DIAGNOSIS — I82.462 ACUTE EMBOLISM AND THROMBOSIS OF LEFT CALF MUSCULAR VEIN: ICD-10-CM

## 2021-04-15 DIAGNOSIS — C79.49 SECONDARY MALIGNANT NEOPLASM OF OTHER PARTS OF NERVOUS SYSTEM: ICD-10-CM

## 2021-04-15 DIAGNOSIS — M84.58XA PATHOLOGICAL FRACTURE IN NEOPLASTIC DISEASE, OTHER SPECIFIED SITE, INITIAL ENCOUNTER FOR FRACTURE: ICD-10-CM

## 2021-04-15 DIAGNOSIS — R06.00 DYSPNEA, UNSPECIFIED: ICD-10-CM

## 2021-04-15 DIAGNOSIS — C79.71 SECONDARY MALIGNANT NEOPLASM OF RIGHT ADRENAL GLAND: ICD-10-CM

## 2021-04-15 DIAGNOSIS — E87.1 HYPO-OSMOLALITY AND HYPONATREMIA: ICD-10-CM

## 2021-04-15 DIAGNOSIS — Z66 DO NOT RESUSCITATE: ICD-10-CM

## 2021-04-15 DIAGNOSIS — J95.811 POSTPROCEDURAL PNEUMOTHORAX: ICD-10-CM

## 2021-04-15 DIAGNOSIS — M84.551A PATHOLOGICAL FRACTURE IN NEOPLASTIC DISEASE, RIGHT FEMUR, INITIAL ENCOUNTER FOR FRACTURE: ICD-10-CM

## 2021-04-15 DIAGNOSIS — E86.0 DEHYDRATION: ICD-10-CM

## 2021-04-15 DIAGNOSIS — C78.7 SECONDARY MALIGNANT NEOPLASM OF LIVER AND INTRAHEPATIC BILE DUCT: ICD-10-CM

## 2021-04-15 DIAGNOSIS — Z88.1 ALLERGY STATUS TO OTHER ANTIBIOTIC AGENTS STATUS: ICD-10-CM

## 2021-04-15 DIAGNOSIS — C78.1 SECONDARY MALIGNANT NEOPLASM OF MEDIASTINUM: ICD-10-CM

## 2021-04-15 DIAGNOSIS — E66.9 OBESITY, UNSPECIFIED: ICD-10-CM

## 2021-04-15 DIAGNOSIS — K57.90 DIVERTICULOSIS OF INTESTINE, PART UNSPECIFIED, WITHOUT PERFORATION OR ABSCESS WITHOUT BLEEDING: ICD-10-CM

## 2021-04-15 DIAGNOSIS — R64 CACHEXIA: ICD-10-CM

## 2021-04-15 DIAGNOSIS — Z87.891 PERSONAL HISTORY OF NICOTINE DEPENDENCE: ICD-10-CM

## 2021-04-15 DIAGNOSIS — E83.52 HYPERCALCEMIA: ICD-10-CM

## 2021-04-15 DIAGNOSIS — Z90.710 ACQUIRED ABSENCE OF BOTH CERVIX AND UTERUS: ICD-10-CM

## 2021-04-15 DIAGNOSIS — M10.9 GOUT, UNSPECIFIED: ICD-10-CM

## 2021-04-15 DIAGNOSIS — C79.32 SECONDARY MALIGNANT NEOPLASM OF CEREBRAL MENINGES: ICD-10-CM

## 2021-04-15 DIAGNOSIS — C79.31 SECONDARY MALIGNANT NEOPLASM OF BRAIN: ICD-10-CM

## 2021-04-15 DIAGNOSIS — C79.52 SECONDARY MALIGNANT NEOPLASM OF BONE MARROW: ICD-10-CM

## 2021-04-15 DIAGNOSIS — C79.51 SECONDARY MALIGNANT NEOPLASM OF BONE: ICD-10-CM

## 2021-04-15 DIAGNOSIS — M84.512A PATHOLOGICAL FRACTURE IN NEOPLASTIC DISEASE, LEFT SHOULDER, INITIAL ENCOUNTER FOR FRACTURE: ICD-10-CM

## 2021-04-24 LAB
CULTURE RESULTS: SIGNIFICANT CHANGE UP
SPECIMEN SOURCE: SIGNIFICANT CHANGE UP

## 2021-05-15 LAB
CULTURE RESULTS: SIGNIFICANT CHANGE UP
SPECIMEN SOURCE: SIGNIFICANT CHANGE UP

## 2022-05-09 NOTE — ED ADULT NURSE NOTE - NS ED NURSE PATIENT LEFT UNIT TIME
Tomas called because his stomach is still bothering him.  Please call Tomas back at [phone].    Thank you.  
none
20:13

## 2022-08-24 NOTE — ED PROVIDER NOTE - PHYSICAL EXAMINATION
CONST: Well appearing in NAD  EYES: PERRL, EOMI, Sclera and conjunctiva clear.   ENT: No nasal discharge. TM's clear B/L without drainage. Oropharynx normal appearing, no erythema or exudates. Uvula midline. Moist mucous membranes  NECK: Non-tender, no meningeal signs  CARD: Normal S1 S2; Normal rate and rhythm  RESP: Equal BS B/L, No wheezes, rhonchi or rales. No distress  GI: Soft, non-tender, non-distended.  MS: Normal ROM in all extremities. No midline spinal tenderness.  SKIN: Warm, dry, no acute rashes. Good turgor  NEURO: A&Ox3, No focal deficits. Strength 5/5 with no sensory deficits. Steady gait Statement Selected

## 2022-12-19 NOTE — ED ADULT NURSE NOTE - BREATHING, MLM
Spontaneous, unlabored and symmetrical
I, Max Solis MD,  performed the initial face to face bedside interview with this patient regarding history of present illness, review of symptoms and relevant past medical, social and family history.  I completed an independent physical examination.  I was the initial provider who evaluated this patient.   I personally saw the patient and performed a substantive portion of the visit including all aspects of the medical decision making.  I have signed out the follow up of any pending tests (i.e. labs, radiological studies) to the [facility].  I have communicated the patient’s plan of care and disposition with the [facility].

## 2023-03-24 NOTE — OCCUPATIONAL THERAPY INITIAL EVALUATION ADULT - FINE MOTOR COORDINATION EXAM
Left UE/Right UE Communicate Risk of Fall with Harm to all staff/Reinforce activity limits and safety measures with patient and family/Tailored Fall Risk Interventions/Visual Cue: Yellow wristband and red socks/Bed in lowest position, wheels locked, appropriate side rails in place/Call bell, personal items and telephone in reach/Instruct patient to call for assistance before getting out of bed or chair/Non-slip footwear when patient is out of bed/[place] to call system/Physically safe environment - no spills, clutter or unnecessary equipment/Purposeful Proactive Rounding/Room/bathroom lighting operational, light cord in reach

## 2024-11-11 NOTE — PHYSICAL THERAPY INITIAL EVALUATION ADULT - ADDITIONAL COMMENTS
ambulatory
Patient living alone in home with steps to enter. Prior to getting very weak to the point of inability to ambulate, patient claims to be independent in ADL's and ambulation
Has 3-4 stairs to enter/exit home.

## 2024-11-20 NOTE — PATIENT PROFILE ADULT - NS PRO AD NO ADVANCE DIRECTIVE
Called patient to confirm 11/22/24 appointment with Brie Adame. Patient states she needs to reschedule. New appointment scheduled for 12/9/24 at 10:30   No

## 2025-01-20 NOTE — DISCHARGE NOTE PROVIDER - REASON FOR ADMISSION
Conjuntivae and eyelids appear normal,  Sclerae : no icteris
[facility]MER HOSPITALIST  DISCHARGE SUMMARY     Isabella Addison Patient Status:  Inpatient    1939 MRN [id number]   Location [facility] 3SW-A Attending No att. providers found   Hosp Day # 3 PCP Jorge Lr MD     Date of Admission: 1/10/2025  Date of Discharge: 2025  Discharge Disposition: SNF Subacute Rehab    Discharge Diagnosis:   GI bleeding - likely upper  Hx of AVM  GERD  Barretts  UTI  Hypokalemia  HTN  DL  CAD  Hx of DVT sp IVC filter  DANNIE  Alzheimers dementia  Depression  Anxiety       History of Present Illness:   Isabella Addison is a 85 year old female with PMH sig for depression, anxiety, GERD, barretts, hx of AVM in duodenum, DL, HTN, CAD, alzheimers, DANNIE, who presents to ED with dark stools and low hgb. Comes from [facility] with hgb less than 7. Pt at baseline has hgb around 8. Was sent to the ER when labs were checked. No abd pain. Pt was scheduled to see GI next week. She does take eliquis but it was being held recently. In the ED routine labs showed INR 1.2, hgb 6.5, creat 1.21. VSS. 1 unit prbc ordered. GI consulted, wanted pt on IV protonix. CT A/P showed diffuse thickening of stomach. The pt was admitted for further care and management. Overnight repeat hgb 6.6.        Brief Synopsis:     GI bleeding - likely upper  Hx of AVM  GERD  Barretts  -pt has hx of AVM sp ablation in 2023  -CT shows thickening of stomach  -no overt bleeding  -sp EGD which showed mild gastritis and 3 AVMs in duodenum sp APC and clipping  -was on eliquis, asa - those on hold >> eliquis resumed per GI, signed off  -PPI drip >> protonix po BID  -serial hgb 8.5 this am  -Oncology consult requested >> recs stopping asa while she's on AC, lovenox 40mg BID therapeutic doses  -US BLE to assess clot burden  -cardiology consulted > ok with stopping ASA     UTI  -UA appears infected  -urine cx > 100k ecoli, pan sens  -on IV ancef >> will transition to po macrobid based on sensitivites and abx 
allergy profile  -should complete total of 7 days abx  -per daughter - UA was checked at facility and pt was supposed to have started an abx  -on outpt chart review cannot find that any abx was started, cannot find urine cx results  -Urology consult req >> dw Mattie ARIAS     Hypokalemia  -replaced  -monitor     HTN  DL  CAD  -resume home meds  -hold eliquis     Hx of DVT sp IVC filter  -on eliquis  -holding in light of low hgb     DANNIE  -cpap at night     Alzheimers dementia  -resume home meds     Depression  Anxiety  -resume home meds      DC INSTRUCTIONS  DC planning in process  Dc  today  ONC, URO, Cards following  GI signed off       Lace+ Score: 75  59-90 High Risk  29-58 Medium Risk  0-28   Low Risk       TCM Follow-Up Recommendation:  LACE > 58: High Risk of readmission after discharge from the hospital.    Procedures during hospitalization:   -sp EGD which showed mild gastritis and 3 AVMs in duodenum sp APC and clipping    Incidental or significant findings and recommendations (brief descriptions):  none    Lab/Test results pending at Discharge:   none    Consultants:  GI    Discharge Medication List:     Discharge Medications        START taking these medications        Instructions Prescription details   enoxaparin 80 MG/0.8ML Sosy  Commonly known as: Lovenox      Inject 0.8 mL (80 mg total) into the skin every 12 (twelve) hours.   Quantity: 100 mL  Refills: 1     pantoprazole 40 MG Tbec  Commonly known as: Protonix      Take 1 tablet (40 mg total) by mouth 2 (two) times daily before meals.   Quantity: 60 tablet  Refills: 1            CONTINUE taking these medications        Instructions Prescription details   acetaminophen 500 MG Tabs  Commonly known as: Tylenol Extra Strength      Take 1 tablet (500 mg total) by mouth every 4 (four) hours as needed for Pain.   Refills: 0     atorvastatin 40 MG Tabs  Commonly known as: Lipitor      Take 1 tablet (40 mg total) by mouth nightly.   Quantity: 90 tablet  Refills: 
3     bumetanide 1 MG Tabs  Commonly known as: Bumex      Take 1 tablet (1 mg total) by mouth BID (Diuretic).   Refills: 0     busPIRone 15 MG Tabs  Commonly known as: Buspar      Take 1 tablet (15 mg total) by mouth in the morning and 1 tablet (15 mg total) before bedtime.   Refills: 0     CALCIUM 600 + D OR      Take 2 tablets by mouth daily.   Refills: 0     cholecalciferol 50 MCG (2000 UT) Caps  Commonly known as: Vitamin D3      Take 2 capsules (4,000 Units total) by mouth daily. 2 capsules daily   Refills: 0     cyanocobalamin 1000 MCG Tabs  Commonly known as: Vitamin B12      Take 1 tablet (1,000 mcg total) by mouth daily.   Refills: 0     DULoxetine 60 MG Cpep  Commonly known as: Cymbalta      Take 1 capsule (60 mg total) by mouth 2 (two) times daily.   Refills: 0     ferrous sulfate 325 (65 FE) MG Tbec      Take 1 tablet (325 mg total) by mouth 3 (three) times daily with meals.   Refills: 0     memantine ER 28 MG Cp24  Commonly known as: Namenda XR      Take 1 capsule (28 mg total) by mouth daily.   Quantity: 30 capsule  Refills: 5     rivastigmine 13.3 MG/24HR Pt24  Commonly known as: Exelon      Place 1 patch onto the skin daily.   Refills: 0     sacubitril-valsartan 24-26 MG Tabs  Commonly known as: Entresto      Take 1 tablet by mouth 2 (two) times daily.   Quantity: 120 tablet  Refills: 5     senna-docusate 8.6-50 MG Tabs  Commonly known as: Senokot-S      Take 1 tablet by mouth in the morning and 1 tablet before bedtime.   Refills: 0            STOP taking these medications      apixaban 5 MG Tabs  Commonly known as: Eliquis        aspirin 81 MG Tbec        Esomeprazole Magnesium 20 MG Cpdr  Commonly known as: NEXIUM               ASK your doctor about these medications        Instructions Prescription details   nitrofurantoin monohydrate macro 100 MG Caps  Commonly known as: Macrobid  Ask about: Should I take this medication?      Take 1 capsule (100 mg total) by mouth 2 (two) times daily for 4 
days.   Stop taking on: January 18, 2025  Quantity: 8 capsule  Refills: 0               Where to Get Your Medications        These medications were sent to [facility] - [place], [place] - [address] [phone], [phone]  [address], [facility] [address]      Phone: [phone]   enoxaparin 80 MG/0.8ML Sosy  nitrofurantoin monohydrate macro 100 MG Caps  pantoprazole 40 MG Tbec         [place] reviewed:   yes    Follow-up appointment:   Manish Rodriguez MD  [address]  [address]  [facility] [address]  [phone]    Follow up in 2 week(s)      Mitchel Veras MD  [address]  [address]  [facility] [id number] [phone]    Follow up in 1 month(s)  urology follow-up in the next 1 month - consideration for cystoscopy due to recurrent UTI    Xiomy Zuleta DO  [address]  [place] [address]  [phone]    Follow up on 1/20/2025  Please see me (hematology) 1/20/25 at 8:30 am. My office will set up follow up in hematology clinic. They will call you to confirm the appointment    Jorge Lr MD  [address]  [address]  [facility] [address]  [phone]    Follow up in 1 week(s)      Puneet Pratt MD  [address]  [address]  [facility] [address]  [phone]    Follow up  As needed    Appointments for Next 30 Days 1/19/2025 - 2/18/2025        Date Arrival Time Visit Type Length Department Provider     1/20/2025  8:30 AM  CONSULT-HEM/ONC  [0517] 60 min [facility] Oncology [place] DO    Patient Instructions:         Location Instructions:     **IF YOU NEED LABWORK OR AN INFUSION ALONG WITH YOUR APPOINTMENT, YOU MUST CALL TO SCHEDULE.**  Your appointment is on the [facility] campus in the Cancer Center. The address is [address] [place] [address]. Please register at the Cancer Center  on the second floor.  Masks are optional for all patients 
and visitors, unless otherwise indicated.                      Vital signs:  Temp:  [97.6 °F (36.4 °C)-98.8 °F (37.1 °C)] 98.6 °F (37 °C)  Pulse:  [67-95] 67  Resp:  [15-20] 20  BP: (127-159)/(66-84) 159/67  SpO2:  [93 %-100 %] 100 %     Physical Exam:    General: No acute distress.   HEENT:  EOMI, PERRLA, OP clear  Respiratory: Clear to auscultation bilaterally. No wheezes. No rhonchi.  Cardiovascular: S1, S2. Regular rate and rhythm. No murmurs.  Abdomen: Soft, nontender, nondistended.  Positive bowel sounds. No rebound or guarding.  Extremities: No edema.  Neuro:  Grossly non focal, no motor deficits.          -----------------------------------------------------------------------------------------------  PATIENT DISCHARGE INSTRUCTIONS: See electronic chart    Viv Fortune MD    Time spent:  > 30 minutes   
difficulty ambulation

## 2025-03-11 NOTE — PROGRESS NOTE ADULT - PROBLEM SELECTOR PLAN 5
Cristina spoke with patient today, Dr. Alexander to do H&P, pre op with PCP not needed.   
Iron studies consistent with AOCD  Likely in setting of post-op + lung adenocarcinoma   Monitor CBC
New diagnosis of metastatic lung CA at [facility] s/p thoracentesis   - Was getting work-up and staging at OSH   - Diffuse mets to bones  - Heme onc following at OSH, plan for possible targeted or immunotherapy.  - If patient is not transferred back to [facility], will need onc c/s here and MRI head w/w/o Pipe, MRI Thoracic & lumbosacral spine noncontrast to complete work-up
New diagnosis of metastatic lung CA at [facility] s/p thoracentesis   - Was getting work-up and staging at OSH   - Diffuse mets to bones  - Heme onc following at OSH, plan for possible targeted or immunotherapy.  - If patient is not transferred back to [facility], will need onc c/s here and MRI head w/w/o Pipe, MRI Thoracic & lumbosacral spine noncontrast to complete work-up